# Patient Record
Sex: FEMALE | Race: WHITE | Employment: FULL TIME | URBAN - METROPOLITAN AREA
[De-identification: names, ages, dates, MRNs, and addresses within clinical notes are randomized per-mention and may not be internally consistent; named-entity substitution may affect disease eponyms.]

---

## 2017-11-15 ENCOUNTER — TELEPHONE (OUTPATIENT)
Dept: CARDIOLOGY CLINIC | Age: 57
End: 2017-11-15

## 2017-11-15 NOTE — TELEPHONE ENCOUNTER
Faxed [facility] [phone] denial of Metoprolol tartrate 25 mg haven't seen since 12-

## 2017-11-27 ENCOUNTER — TELEPHONE (OUTPATIENT)
Dept: CARDIOLOGY CLINIC | Age: 57
End: 2017-11-27

## 2017-11-27 NOTE — TELEPHONE ENCOUNTER
Pt has made an appt w/Dr. Valentino Greenspan on 12/26/17, but she needs a refill on her meds until she can come in for her visit. Please call to advise if this can be done.     Thanks,

## 2017-11-28 RX ORDER — METOPROLOL TARTRATE 25 MG/1
12.5 TABLET, FILM COATED ORAL EVERY 12 HOURS
Qty: 90 TAB | Refills: 0 | Status: SHIPPED | OUTPATIENT
Start: 2017-11-28 | End: 2017-12-19 | Stop reason: SDUPTHER

## 2017-12-25 RX ORDER — METOPROLOL TARTRATE 25 MG/1
12.5 TABLET, FILM COATED ORAL EVERY 12 HOURS
Qty: 90 TAB | Refills: 0 | Status: SHIPPED | OUTPATIENT
Start: 2017-12-25 | End: 2018-02-05 | Stop reason: ALTCHOICE

## 2017-12-26 ENCOUNTER — HOSPITAL ENCOUNTER (EMERGENCY)
Age: 57
Discharge: HOME | End: 2017-12-26
Attending: EMERGENCY MEDICINE
Payer: COMMERCIAL

## 2017-12-26 VITALS
RESPIRATION RATE: 16 BRPM | HEIGHT: 66 IN | TEMPERATURE: 98.1 F | HEART RATE: 65 BPM | DIASTOLIC BLOOD PRESSURE: 76 MMHG | WEIGHT: 265 LBS | OXYGEN SATURATION: 97 % | SYSTOLIC BLOOD PRESSURE: 130 MMHG | BODY MASS INDEX: 42.59 KG/M2

## 2017-12-26 DIAGNOSIS — R00.2 PALPITATIONS: Primary | ICD-10-CM

## 2017-12-26 LAB
ALBUMIN SERPL-MCNC: 3.3 G/DL (ref 3.5–5)
ALBUMIN/GLOB SERPL: 0.9 {RATIO} (ref 1.1–2.2)
ALP SERPL-CCNC: 85 U/L (ref 45–117)
ALT SERPL-CCNC: 20 U/L (ref 12–78)
ANION GAP SERPL CALC-SCNC: 7 MMOL/L (ref 5–15)
APPEARANCE UR: CLEAR
AST SERPL-CCNC: 17 U/L (ref 15–37)
ATRIAL RATE: 78 BPM
BACTERIA URNS QL MICRO: ABNORMAL /HPF
BASOPHILS # BLD: 0 K/UL (ref 0–0.1)
BASOPHILS NFR BLD: 0 % (ref 0–1)
BILIRUB SERPL-MCNC: 0.4 MG/DL (ref 0.2–1)
BILIRUB UR QL: NEGATIVE
BNP SERPL-MCNC: 392 PG/ML (ref 0–125)
BUN SERPL-MCNC: 17 MG/DL (ref 6–20)
BUN/CREAT SERPL: 22 (ref 12–20)
CALCIUM SERPL-MCNC: 8.5 MG/DL (ref 8.5–10.1)
CALCULATED P AXIS, ECG09: 25 DEGREES
CALCULATED R AXIS, ECG10: -6 DEGREES
CALCULATED T AXIS, ECG11: -31 DEGREES
CHLORIDE SERPL-SCNC: 109 MMOL/L (ref 97–108)
CK SERPL-CCNC: 141 U/L (ref 26–192)
CO2 SERPL-SCNC: 26 MMOL/L (ref 21–32)
COLOR UR: ABNORMAL
CREAT SERPL-MCNC: 0.76 MG/DL (ref 0.55–1.02)
D DIMER PPP FEU-MCNC: 0.54 MG/L FEU (ref 0–0.65)
DIAGNOSIS, 93000: NORMAL
EOSINOPHIL # BLD: 0.2 K/UL (ref 0–0.4)
EOSINOPHIL NFR BLD: 1 % (ref 0–7)
EPITH CASTS URNS QL MICRO: ABNORMAL /LPF
ERYTHROCYTE [DISTWIDTH] IN BLOOD BY AUTOMATED COUNT: 14.2 % (ref 11.5–14.5)
GLOBULIN SER CALC-MCNC: 3.6 G/DL (ref 2–4)
GLUCOSE SERPL-MCNC: 112 MG/DL (ref 65–100)
GLUCOSE UR STRIP.AUTO-MCNC: NEGATIVE MG/DL
HCT VFR BLD AUTO: 42.1 % (ref 35–47)
HGB BLD-MCNC: 13.8 G/DL (ref 11.5–16)
HGB UR QL STRIP: ABNORMAL
HYALINE CASTS URNS QL MICRO: ABNORMAL /LPF (ref 0–5)
KETONES UR QL STRIP.AUTO: NEGATIVE MG/DL
LACTATE SERPL-SCNC: 1 MMOL/L (ref 0.4–2)
LEUKOCYTE ESTERASE UR QL STRIP.AUTO: ABNORMAL
LYMPHOCYTES # BLD: 2 K/UL (ref 0.8–3.5)
LYMPHOCYTES NFR BLD: 17 % (ref 12–49)
MCH RBC QN AUTO: 29.2 PG (ref 26–34)
MCHC RBC AUTO-ENTMCNC: 32.8 G/DL (ref 30–36.5)
MCV RBC AUTO: 89.2 FL (ref 80–99)
MONOCYTES # BLD: 0.9 K/UL (ref 0–1)
MONOCYTES NFR BLD: 8 % (ref 5–13)
NEUTS SEG # BLD: 8.6 K/UL (ref 1.8–8)
NEUTS SEG NFR BLD: 74 % (ref 32–75)
NITRITE UR QL STRIP.AUTO: NEGATIVE
P-R INTERVAL, ECG05: 138 MS
PH UR STRIP: 5 [PH] (ref 5–8)
PLATELET # BLD AUTO: 334 K/UL (ref 150–400)
POTASSIUM SERPL-SCNC: 3.8 MMOL/L (ref 3.5–5.1)
PROT SERPL-MCNC: 6.9 G/DL (ref 6.4–8.2)
PROT UR STRIP-MCNC: NEGATIVE MG/DL
Q-T INTERVAL, ECG07: 380 MS
QRS DURATION, ECG06: 82 MS
QTC CALCULATION (BEZET), ECG08: 433 MS
RBC # BLD AUTO: 4.72 M/UL (ref 3.8–5.2)
RBC #/AREA URNS HPF: ABNORMAL /HPF (ref 0–5)
SODIUM SERPL-SCNC: 142 MMOL/L (ref 136–145)
SP GR UR REFRACTOMETRY: 1.02 (ref 1–1.03)
TROPONIN I SERPL-MCNC: <0.04 NG/ML
UA: UC IF INDICATED,UAUC: ABNORMAL
UROBILINOGEN UR QL STRIP.AUTO: 0.2 EU/DL (ref 0.2–1)
VENTRICULAR RATE, ECG03: 78 BPM
WBC # BLD AUTO: 11.7 K/UL (ref 3.6–11)
WBC URNS QL MICRO: ABNORMAL /HPF (ref 0–4)

## 2017-12-26 PROCEDURE — 99285 EMERGENCY DEPT VISIT HI MDM: CPT

## 2017-12-26 PROCEDURE — 85025 COMPLETE CBC W/AUTO DIFF WBC: CPT | Performed by: EMERGENCY MEDICINE

## 2017-12-26 PROCEDURE — 83880 ASSAY OF NATRIURETIC PEPTIDE: CPT | Performed by: EMERGENCY MEDICINE

## 2017-12-26 PROCEDURE — 83605 ASSAY OF LACTIC ACID: CPT | Performed by: EMERGENCY MEDICINE

## 2017-12-26 PROCEDURE — 94762 N-INVAS EAR/PLS OXIMTRY CONT: CPT

## 2017-12-26 PROCEDURE — 81001 URINALYSIS AUTO W/SCOPE: CPT | Performed by: EMERGENCY MEDICINE

## 2017-12-26 PROCEDURE — 36415 COLL VENOUS BLD VENIPUNCTURE: CPT | Performed by: EMERGENCY MEDICINE

## 2017-12-26 PROCEDURE — 82550 ASSAY OF CK (CPK): CPT | Performed by: EMERGENCY MEDICINE

## 2017-12-26 PROCEDURE — 87086 URINE CULTURE/COLONY COUNT: CPT | Performed by: EMERGENCY MEDICINE

## 2017-12-26 PROCEDURE — 80053 COMPREHEN METABOLIC PANEL: CPT | Performed by: EMERGENCY MEDICINE

## 2017-12-26 PROCEDURE — 87077 CULTURE AEROBIC IDENTIFY: CPT | Performed by: EMERGENCY MEDICINE

## 2017-12-26 PROCEDURE — 84484 ASSAY OF TROPONIN QUANT: CPT | Performed by: EMERGENCY MEDICINE

## 2017-12-26 PROCEDURE — 87186 SC STD MICRODIL/AGAR DIL: CPT | Performed by: EMERGENCY MEDICINE

## 2017-12-26 PROCEDURE — 93005 ELECTROCARDIOGRAM TRACING: CPT

## 2017-12-26 PROCEDURE — 85379 FIBRIN DEGRADATION QUANT: CPT | Performed by: EMERGENCY MEDICINE

## 2017-12-26 NOTE — ED PROVIDER NOTES
EMERGENCY DEPARTMENT HISTORY AND PHYSICAL EXAM      Date: 12/26/2017  Patient Name: Alexys Canada    History of Presenting Illness     Chief Complaint   Patient presents with    Anxiety     x 5 hours. hx of anxiety. pt states \"i started feeling palpitations and usually i can drink some water and get it to go away but i couldnt this time\"    Palpitations       History Provided By: Patient    HPI: Alexys Canada is a 56 y.o. female, who presents via EMS to the ED c/o acute onset of heart palpitations x2130. Pt notes she took her nightly Metoprolol and a baby ASA, along with drinking water without any relief of sxs. Pt reports by the time EMS had arrived her sxs had resolved. She denies any recent long distance travel. Pt specifically denies any leg pain/swelling, fever, congestion, cough, shortness of breath, chest pain, abdominal pain, nausea, vomiting, diarrhea, dysuria, or urinary frequency. PCP: None   Cardiologist: Lico Molina MD    PMHx: Significant for A-fib  PSHx: Significant for tubal Ligation  Social Hx: +tobacco, -EtOH, -Illicit Drugs     There are no other complaints, changes, or physical findings at this time. Current Outpatient Prescriptions   Medication Sig Dispense Refill    metoprolol tartrate (LOPRESSOR) 25 mg tablet Take 0.5 Tabs by mouth every twelve (12) hours. Dr Lico Molina appointment 12/26/17 @ 315 PM 90 Tab 0    aspirin delayed-release 81 mg tablet Take 1 Tab by mouth daily. Over the counter for atrial fibrillation 90 Tab 11       Past History     Past Medical History:  History reviewed. No pertinent past medical history.     Past Surgical History:  Past Surgical History:   Procedure Laterality Date    HX TUBAL LIGATION         Family History:  Family History   Problem Relation Age of Onset    Other Other      no known FH of CAD       Social History:  Social History   Substance Use Topics    Smoking status: Current Every Day Smoker     Packs/day: 1.00     Years: 35.00 Types: Cigarettes    Smokeless tobacco: Never Used    Alcohol use No       Allergies: Allergies   Allergen Reactions    Beef Containing Products Hives    Pork/Porcine Containing Products Hives    Macrobid [Nitrofurantoin Monohyd/M-Cryst] Hives    Pcn [Penicillins] Hives         Review of Systems   Review of Systems   Constitutional: Negative. Negative for fever. Eyes: Negative. Respiratory: Negative. Negative for shortness of breath. Cardiovascular: Positive for palpitations. Negative for chest pain. Gastrointestinal: Negative for abdominal pain, nausea and vomiting. Endocrine: Negative. Genitourinary: Negative. Negative for difficulty urinating, dysuria and hematuria. Musculoskeletal: Negative. Skin: Negative. Allergic/Immunologic: Negative. Neurological: Negative. Psychiatric/Behavioral: Negative for suicidal ideas. All other systems reviewed and are negative. Physical Exam   Physical Exam   Constitutional: She is oriented to person, place, and time. She appears well-developed and well-nourished. No distress. HENT:   Head: Normocephalic and atraumatic. Nose: Nose normal.   Eyes: Conjunctivae and EOM are normal. No scleral icterus. Neck: Normal range of motion. No tracheal deviation present. Cardiovascular: Normal rate, regular rhythm, normal heart sounds and intact distal pulses. Exam reveals no friction rub. No murmur heard. Pulmonary/Chest: Effort normal and breath sounds normal. No stridor. No respiratory distress. She has no wheezes. She has no rales. Abdominal: Soft. Bowel sounds are normal. She exhibits no distension. There is no tenderness. There is no rebound. Musculoskeletal: Normal range of motion. She exhibits no tenderness. Neurological: She is alert and oriented to person, place, and time. No cranial nerve deficit. Skin: Skin is warm and dry. No rash noted. She is not diaphoretic. Psychiatric: She has a normal mood and affect.  Her speech is normal and behavior is normal. Judgment and thought content normal. Cognition and memory are normal.   Nursing note and vitals reviewed. Diagnostic Study Results     Labs -     Recent Results (from the past 12 hour(s))   CBC WITH AUTOMATED DIFF    Collection Time: 12/26/17  3:19 AM   Result Value Ref Range    WBC 11.7 (H) 3.6 - 11.0 K/uL    RBC 4.72 3.80 - 5.20 M/uL    HGB 13.8 11.5 - 16.0 g/dL    HCT 42.1 35.0 - 47.0 %    MCV 89.2 80.0 - 99.0 FL    MCH 29.2 26.0 - 34.0 PG    MCHC 32.8 30.0 - 36.5 g/dL    RDW 14.2 11.5 - 14.5 %    PLATELET 809 682 - 078 K/uL    NEUTROPHILS 74 32 - 75 %    LYMPHOCYTES 17 12 - 49 %    MONOCYTES 8 5 - 13 %    EOSINOPHILS 1 0 - 7 %    BASOPHILS 0 0 - 1 %    ABS. NEUTROPHILS 8.6 (H) 1.8 - 8.0 K/UL    ABS. LYMPHOCYTES 2.0 0.8 - 3.5 K/UL    ABS. MONOCYTES 0.9 0.0 - 1.0 K/UL    ABS. EOSINOPHILS 0.2 0.0 - 0.4 K/UL    ABS.  BASOPHILS 0.0 0.0 - 0.1 K/UL   LACTIC ACID    Collection Time: 12/26/17  3:19 AM   Result Value Ref Range    Lactic acid 1.0 0.4 - 2.0 MMOL/L   URINALYSIS W/ REFLEX CULTURE    Collection Time: 12/26/17  3:19 AM   Result Value Ref Range    Color YELLOW/STRAW      Appearance CLEAR CLEAR      Specific gravity 1.017 1.003 - 1.030      pH (UA) 5.0 5.0 - 8.0      Protein NEGATIVE  NEG mg/dL    Glucose NEGATIVE  NEG mg/dL    Ketone NEGATIVE  NEG mg/dL    Bilirubin NEGATIVE  NEG      Blood MODERATE (A) NEG      Urobilinogen 0.2 0.2 - 1.0 EU/dL    Nitrites NEGATIVE  NEG      Leukocyte Esterase SMALL (A) NEG      WBC 20-50 0 - 4 /hpf    RBC 5-10 0 - 5 /hpf    Epithelial cells FEW FEW /lpf    Bacteria 1+ (A) NEG /hpf    UA:UC IF INDICATED URINE CULTURE ORDERED (A) CNI      Hyaline cast 0-2 0 - 5 /lpf   D DIMER    Collection Time: 12/26/17  3:19 AM   Result Value Ref Range    D-dimer 0.54 0.00 - 0.65 mg/L FEU       Radiologic Studies -   No orders to display     CT Results  (Last 48 hours)    None        CXR Results  (Last 48 hours)    None            Medical Decision Making   I am the first provider for this patient. I reviewed the vital signs, available nursing notes, past medical history, past surgical history, family history and social history. Vital Signs-Reviewed the patient's vital signs. Patient Vitals for the past 12 hrs:   Temp Pulse Resp BP SpO2   12/26/17 0225 98.1 °F (36.7 °C) 81 14 121/46 99 %       Pulse Oximetry Analysis - 99% on RA    Cardiac Monitor:   Rate: 81 bpm  Rhythm: Normal Sinus Rhythm      Records Reviewed: Nursing Notes and Old Medical Records    Provider Notes (Medical Decision Making):     DDX:  afib with rvr, arrhythmia, electrolyte abnormality, uti, dehydration, acs, pe    Plan:  Labs, ce's, d dimer    Impression:  palpitations    ED Course:   Initial assessment performed. The patients presenting problems have been discussed, and they are in agreement with the care plan formulated and outlined with them. I have encouraged them to ask questions as they arise throughout their visit. I reviewed our electronic medical record system for any past medical records that were available that may contribute to the patients current condition, the nursing notes and and vital signs from today's visit    Nursing notes will be reviewed as they become available in realtime while the pt has been in the ED. Quang Salas MD    I have spent 3-7 minutes discussing the medical risks of prolonged smoking habits and advised the patient of the benefits of the cessation of smoking, providing specific suggestions on how to quit. Quang Salas MD    EKG interpretation 1027: NSR, nl Axis, rate 78; , QRS 82, QTc 433; non specific t wave abnormality, no change from previous, no acute ischemia; Quang Salas MD    I personally reviewed pt's imaging. Official read by radiology listed below.   Quang Salas MD    5:36 AM  Progress note:  Pt noted to be feeling better, has been palpitation free and resting comfortably since arriving to ED, ready for discharge. Discussed lab and imaging findings with pt and/or family, specifically noting few bacteria in urine and plan to wait for culture. Pt will follow up as instructed. All questions have been answered, pt voiced understanding and agreement with plan. If narcotics were prescribed, pt was advised not to drive or operate heavy machinery. If abx were prescribed, pt advised that diarrhea and rash are possible side effects of the medications. Specific return precautions provided in addition to instructions for pt to return to the ED immediately should sx worsen at any time. Johnny Calix MD        PLAN:  1. Current Discharge Medication List        2. Follow-up Information     Follow up With Details Comments Contact Info    Jie Eaton MD Call today  [address]  [address] [address] [phone]          Return to ED if worse     Disposition:    Dc home with cardiology f/u      Diagnosis     Clinical Impression:   1. Palpitations        Attestations: This note is prepared by Natalie Solitario, acting as Scribe for MD Johnny Marcial MD : The scribe's documentation has been prepared under my direction and personally reviewed by me in its entirety. I confirm that the note above accurately reflects all work, treatment, procedures, and medical decision making performed by me. This note will not be viewable in [address]

## 2017-12-26 NOTE — ED NOTES
Dr. Luca Thomas reviewed discharge instructions with the patient. The patient verbalized understanding. All questions and concerns were addressed. The patient declined a wheelchair and is discharged ambulatory in the care of family members with instructions and prescriptions in hand. Pt is alert and oriented x 4. Respirations are clear and unlabored.

## 2017-12-26 NOTE — DISCHARGE INSTRUCTIONS
Cardiac Arrhythmia: Care Instructions  Your Care Instructions    A cardiac arrhythmia is a change in the normal rhythm of the heart. Your heart may beat too fast or too slow or beat with an irregular or skipping rhythm. A change in the heart's rhythm may feel like a really strong heartbeat or a fluttering in your chest. A severe heart rhythm problem can keep the body from getting the blood it needs. This can result in shortness of breath, lightheadedness, and fainting. You may take medicine to treat your condition. Your doctor may recommend a pacemaker or recommend catheter ablation to destroy small parts of the heart that are causing a rhythm problem. Another possible treatment is an implantable cardioverter-defibrillator (ICD). An ICD is a device that gives the heart a shock to return the heart to a normal rhythm. Follow-up care is a key part of your treatment and safety. Be sure to make and go to all appointments, and call your doctor if you are having problems. It's also a good idea to know your test results and keep a list of the medicines you take. How can you care for yourself at home? ?General care  ? · Be safe with medicines. Take your medicines exactly as prescribed. Call your doctor if you think you are having a problem with your medicine. You will get more details on the specific medicines your doctor prescribes. ? · If you received a pacemaker or an ICD, you will get a fact sheet about it. ? · Wear medical alert jewelry that says you have an abnormal heart rhythm. You can buy this at most drugstores. ? Lifestyle changes  ? · Eat a heart-healthy diet. ? · Stay at a healthy weight. Lose weight if you need to. ? · Avoid nicotine, too much alcohol, and illegal drugs (meth, speed, and cocaine). Also, get enough sleep and do not overeat. ? · Ask your doctor whether you can take over-the-counter medicines (such as decongestants).  These can make your heart beat fast.   ? · Talk to your doctor about any limits to activities, such as driving, or tasks where you use power tools or ladders. Activity  ? · Start light exercise if your doctor says you can. Even a small amount will help you get stronger, have more energy, and manage your stress. ? · Get regular exercise. Try for 30 minutes on most days of the week. Ask your doctor what level of exercise is safe for you. If activity is not likely to cause health problems, you probably do not have limits on the type or level of activity that you can do. You may want to walk, swim, bike, or do other activities. ? · When you exercise, watch for signs that your heart is working too hard. You are pushing too hard if you cannot talk while you exercise. If you become short of breath or dizzy or have chest pain, sit down and rest.   ? · Check your pulse daily. Place two fingers on the artery at the palm side of your wrist, in line with your thumb. If your heartbeat seems uneven, talk to your doctor. When should you call for help? Call 911 anytime you think you may need emergency care. For example, call if:  ? · You have symptoms of sudden heart failure. These may include:  ¨ Severe trouble breathing. ¨ A fast or irregular heartbeat. ¨ Coughing up pink, foamy mucus. ¨ You passed out. ? · You have signs of a stroke. These include:  ¨ Sudden numbness, paralysis, or weakness in your face, arm, or leg, especially on only one side of your body. ¨ New problems with walking or balance. ¨ Sudden vision changes. ¨ Drooling or slurred speech. ¨ New problems speaking or understanding simple statements, or feeling confused. ¨ A sudden, severe headache that is different from past headaches. ?Call your doctor now or seek immediate medical care if:  ? · You have new or changed symptoms of heart failure, such as:  ¨ New or increased shortness of breath. ¨ New or worse swelling in your legs, ankles, or feet.   ¨ Sudden weight gain, such as more than 2 to 3 pounds in a day or 5 pounds in a week. (Your doctor may suggest a different range of weight gain.)  ¨ Feeling dizzy or lightheaded or like you may faint. ¨ Feeling so tired or weak that you cannot do your usual activities. ¨ Not sleeping well. Shortness of breath wakes you at night. You need extra pillows to prop yourself up to breathe easier. ? Watch closely for changes in your health, and be sure to contact your doctor if:  ? · You do not get better as expected. Where can you learn more? Go to http://trevor-darrick.info/. Enter R892 in the search box to learn more about \"Cardiac Arrhythmia: Care Instructions. \"  Current as of: September 21, 2016  Content Version: 11.4  © 2093-1946 [employer]. Care instructions adapted under license by [employer] (which disclaims liability or warranty for this information). If you have questions about a medical condition or this instruction, always ask your healthcare professional. [address] any warranty or liability for your use of this information.

## 2017-12-26 NOTE — LETTER
12/29/2017 [place] [address] Dear Ms. Akash Salvador, You were recently seen in the Emergency Department of [place] [address]. and had lab work performed. We would like to discuss these results with you. Please call the Emergency Department at your earliest convenience at [phone] between 10am-8pm to speak with one of our providers. Sincerely, Emory De Leon, [address] 
 
 
[facility] EMERGENCY DEPT 
[address] [place] [address] 
[phone]

## 2017-12-27 NOTE — PROGRESS NOTES
Pt seen for palpitations. No urinary complaints. Not discharged on antibiotics. Final results and sensitivities pending.

## 2017-12-28 LAB
BACTERIA SPEC CULT: ABNORMAL
CC UR VC: ABNORMAL
SERVICE CMNT-IMP: ABNORMAL

## 2018-02-05 ENCOUNTER — OFFICE VISIT (OUTPATIENT)
Dept: CARDIOLOGY CLINIC | Age: 58
End: 2018-02-05

## 2018-02-05 VITALS
DIASTOLIC BLOOD PRESSURE: 88 MMHG | SYSTOLIC BLOOD PRESSURE: 126 MMHG | HEIGHT: 66 IN | RESPIRATION RATE: 18 BRPM | HEART RATE: 76 BPM | OXYGEN SATURATION: 97 % | WEIGHT: 246.9 LBS | BODY MASS INDEX: 39.68 KG/M2

## 2018-02-05 DIAGNOSIS — I49.9 IRREGULAR HEART BEAT: ICD-10-CM

## 2018-02-05 DIAGNOSIS — R07.2 PRECORDIAL CHEST PAIN: ICD-10-CM

## 2018-02-05 DIAGNOSIS — R53.83 FATIGUE, UNSPECIFIED TYPE: ICD-10-CM

## 2018-02-05 DIAGNOSIS — R00.2 HEART PALPITATIONS: ICD-10-CM

## 2018-02-05 DIAGNOSIS — I48.0 PAF (PAROXYSMAL ATRIAL FIBRILLATION) (HCC): Primary | ICD-10-CM

## 2018-02-05 RX ORDER — METOPROLOL SUCCINATE 25 MG/1
12.5 TABLET, EXTENDED RELEASE ORAL
Qty: 45 TAB | Refills: 3 | Status: SHIPPED | OUTPATIENT
Start: 2018-02-05 | End: 2018-06-11 | Stop reason: SDUPTHER

## 2018-02-05 NOTE — MR AVS SNAPSHOT
[address] [facility] 
[phone] Patient: Samantha Ledesma MRN: [id number] EXI:[id number] Visit Information Date & Time Provider Department Dept. Phone Encounter #  
 2/5/2018 11:15 AM Temo Cheung, [address] [place] [address] [facility] [phone] [id number] Upcoming Health Maintenance Date Due Hepatitis C Screening 1960 Pneumococcal 19-64 Medium Risk (1 of 1 - PPSV23) 2/9/1981 PAP AKA CERVICAL CYTOLOGY 2/9/1983 BREAST CANCER SCRN MAMMOGRAM 2/9/2012 FOBT Q 1 YEAR AGE 50-75 2/9/2012 Influenza Age 5 to Adult 8/1/2017 DTaP/Tdap/Td series (2 - Td) 7/21/2026 Allergies as of 2/5/2018  Review Complete On: 2/5/2018 By: Temo Cheung MD  
  
 Severity Noted Reaction Type Reactions Beef Containing Products High 11/28/2015    Hives Pork/porcine Containing Products High 11/28/2015    Hives Macrobid [Nitrofurantoin Monohyd/m-cryst]  11/28/2015    Hives Pcn [Penicillins]  10/14/2013    Hives Current Immunizations  Reviewed on 11/27/2015 Name Date Influenza Vaccine (Quad) PF 11/28/2015 11:48 AM  
 Tdap 7/21/2016 12:02 PM  
  
 Not reviewed this visit You Were Diagnosed With   
  
 Codes Comments Irregular heart beat    -  Primary ICD-10-CM: I49.9 ICD-9-CM: 427.9 PAF (paroxysmal atrial fibrillation) (HCC)     ICD-10-CM: I48.0 ICD-9-CM: 427.31 Fatigue, unspecified type     ICD-10-CM: R53.83 ICD-9-CM: 780.79 Precordial chest pain     ICD-10-CM: R07.2 ICD-9-CM: 786.51 Heart palpitations     ICD-10-CM: R00.2 ICD-9-CM: 785.1 Vitals BP Pulse Resp Height(growth percentile) Weight(growth percentile) SpO2  
 126/88 (BP 1 Location: Right arm, BP Patient Position: Sitting) 76 18 5' 6\" (1.676 m) 246 lb 14.4 oz (112 kg) 97% BMI Smoking Status 39.85 kg/m2 Current Every Day Smoker Vitals History BMI and BSA Data Body Mass Index Body Surface Area  
 39.85 kg/m 2 2.28 m 2 Preferred Pharmacy Pharmacy Name Phone [facility] [phone] Your Updated Medication List  
  
   
This list is accurate as of: 2/5/18 12:23 PM.  Always use your most recent med list.  
  
  
  
  
 aspirin delayed-release 81 mg tablet Take 1 Tab by mouth daily. Over the counter for atrial fibrillation  
  
 metoprolol succinate 25 mg XL tablet Commonly known as:  TOPROL-XL Take 0.5 Tabs by mouth nightly. Stop Lopressor Prescriptions Sent to Pharmacy Refills  
 metoprolol succinate (TOPROL-XL) 25 mg XL tablet 3 Sig: Take 0.5 Tabs by mouth nightly. Stop Lopressor Class: Normal  
 Pharmacy: [address] [place] [address] #: [phone] Route: Oral  
  
We Performed the Following AMB POC EKG ROUTINE W/ 12 LEADS, INTER & REP [93769 CPT(R)] CBC WITH AUTOMATED DIFF [21443 CPT(R)] LIPID PANEL [39389 CPT(R)] METABOLIC PANEL, COMPREHENSIVE [72393 CPT(R)] REFERRAL TO SLEEP STUDIES [REF99 Custom] THYROID PANEL W/TSH [03870 CPT(R)] VITAMIN B12 & FOLATE [78298 CPT(R)] To-Do List   
 02/05/2018 ECG:  ECG EVENT RECORD MONITORING SET-UP   
  
 02/06/2018 ECHO:  ECHO TTE STRESS EXRCSE COMP W OR WO CONTR   
  
 02/06/2018 Lab:  VITAMIN D, 1, 25 DIHYDROXY Referral Information Referral ID Referred By Referred To  
  
 [id number] [facility], [address] G[address] Doss MD   
   [address] [place] [address] [address] [place], [address] Phone: [phone] Fax: [phone] Visits Status Start Date End Date 1 New Request 2/5/18 2/5/19 If your referral has a status of pending review or denied, additional information will be sent to support the outcome of this decision. Introducing [facility]! [employer] introduces [employer] patient portal. Now you can access parts of your medical record, email your doctor's office, and request medication refills online. 1. In your internet browser, go to [email or web address] 2. Click on the First Time User? Click Here link in the Sign In box. You will see the New Member Sign Up page. 3. Enter your [employer] Access Code exactly as it appears below. You will not need to use this code after youve completed the sign-up process. If you do not sign up before the expiration date, you must request a new code. · [employer] Access Code: [email or web address] Expires: 3/26/2018  5:33 AM 
 
4. Enter the last four digits of your Social Security Number (xxxx) and Date of Birth (mm/dd/yyyy) as indicated and click Submit. You will be taken to the next sign-up page. 5. Create a [employer]wheel ID. This will be your [employer] login ID and cannot be changed, so think of one that is secure and easy to remember. 6. Create a [employer] password. You can change your password at any time. 7. Enter your Password Reset Question and Answer. This can be used at a later time if you forget your password. 8. Enter your e-mail address. You will receive e-mail notification when new information is available in [address] 9. Click Sign Up. You can now view and download portions of your medical record. 10. Click the Download Summary menu link to download a portable copy of your medical information. If you have questions, please visit the Frequently Asked Questions section of the [employer] website. Remember, [employer] is NOT to be used for urgent needs. For medical emergencies, dial 911. Now available from your iPhone and Android! Please provide this summary of care documentation to your next provider. Your primary care clinician is listed as NONE. If you have any questions after today's visit, please call [phone].

## 2018-02-05 NOTE — PROGRESS NOTES
Bradly De La Cruz DNP, ANP-BC  Subjective/HPI:     Viktoriya Pineda is a 56 y.o. female is here for routine f/u. Ms. Acosta Silveira presented to emergency room December 26 with reported fluttering and palpitations, sinus rhythm. She feels it may have been more anxiety related. History of paroxysmal A. fib has maintain aspirin and Lopressor 12.5 mg twice a day however admits to only taking it once a day during the summer. She feels taking it twice a day is needed in the winter hours secondary to higher levels of stress. She also reports episodes of left arm pain with or without exertional activities, and dyspnea on exertion but admits she does not exert herself much. Has witnessed episodes of sleep apnea per family members, we discussed that at 1 consult a sleep study however she did not have coverage at the time but is willing to proceed now. Smokes 1 pack per day. Patient Active Problem List   Diagnosis Code    PAF (paroxysmal atrial fibrillation) ([facility]) I48.0    UTI (lower urinary tract infection) N39.0    Allergic drug rash due to sulfonamide L27.0, T37.0X5A    Irregular heart beat I49.9       PCP Provider  None  History reviewed. No pertinent past medical history. Past Surgical History:   Procedure Laterality Date    HX TUBAL LIGATION       Allergies   Allergen Reactions    Beef Containing Products Hives    Pork/Porcine Containing Products Hives    Macrobid [Nitrofurantoin Monohyd/M-Cryst] Hives    Pcn [Penicillins] Hives      Family History   Problem Relation Age of Onset    Other Other      no known FH of CAD      Current Outpatient Prescriptions   Medication Sig    metoprolol succinate (TOPROL-XL) 25 mg XL tablet Take 0.5 Tabs by mouth nightly. Stop Lopressor    aspirin delayed-release 81 mg tablet Take 1 Tab by mouth daily. Over the counter for atrial fibrillation     No current facility-administered medications for this visit.        Vitals:    02/05/18 1115 02/05/18 1130   BP: 124/90 126/88   Pulse: 76    Resp: 18    SpO2: 97%    Weight: 246 lb 14.4 oz (112 kg)    Height: 5' 6\" (1.676 m)      Social History     Social History    Marital status:      Spouse name: N/A    Number of children: N/A    Years of education: N/A     Occupational History    Not on file. Social History Main Topics    Smoking status: Current Every Day Smoker     Packs/day: 1.00     Years: 35.00     Types: Cigarettes    Smokeless tobacco: Never Used    Alcohol use No    Drug use: No    Sexual activity: Not on file     Other Topics Concern    Not on file     Social History Narrative       I have reviewed the nurses notes, vitals, problem list, allergy list, medical history, family, social history and medications. Review of Symptoms:    General: Pt denies excessive weight gain or loss. Pt is able to conduct ADL's, +  daytime fatigue  + HEENT: Denies blurred vision, headaches, epistaxis and difficulty swallowing. Respiratory: Denies shortness of breath, NIETO, wheezing or stridor. Cardiovascular: Denies precordial pain, + intermittent denies palpitations, edema or PND  Gastrointestinal: Denies poor appetite, indigestion, abdominal pain or blood in stool  Musculoskeletal: Denies pain or swelling from muscles or joints  Neurologic: Denies tremor, paresthesias, or sensory motor disturbance  Skin: Denies rash, itching or texture change. Physical Exam:      General: Well developed, in no acute distress, cooperative and alert  HEENT: No carotid bruits, no JVD, trach is midline. Neck Supple, PEERL, EOM intact. Heart:  Normal S1/S2 negative S3 or S4. Regular, no murmur, gallop or rub.   Respiratory: Clear bilaterally x 4, no wheezing or rales  Abdomen:   Soft, non-tender, no masses, bowel sounds are active.   Extremities:  No edema, normal cap refill, no cyanosis, atraumatic. Neuro: A&Ox3, speech clear, gait stable. Skin: Skin color is normal. No rashes or lesions.  Non diaphoretic  Vascular: 2+ pulses symmetric in all extremities    Cardiographics    ECG: Sinus rhythm  Results for orders placed or performed during the hospital encounter of 12/26/17   EKG, 12 LEAD, INITIAL   Result Value Ref Range    Ventricular Rate 78 BPM    Atrial Rate 78 BPM    P-R Interval 138 ms    QRS Duration 82 ms    Q-T Interval 380 ms    QTC Calculation (Bezet) 433 ms    Calculated P Axis 25 degrees    Calculated R Axis -6 degrees    Calculated T Axis -31 degrees    Diagnosis       Normal sinus rhythm  Non-specific ST & T wave changes  Confirmed by Edward Aragon ([id number]) on 12/26/2017 7:27:55 PM           Cardiology Labs:  No results found for: CHOL, CHOLX, CHLST, CHOLV, 395467, HDL, LDL, LDLC, DLDLP, TGLX, TRIGL, TRIGP, CHHD, [facility]    Lab Results   Component Value Date/Time    Sodium 142 12/26/2017 04:03 AM    Potassium 3.8 12/26/2017 04:03 AM    Chloride 109 (H) 12/26/2017 04:03 AM    CO2 26 12/26/2017 04:03 AM    Anion gap 7 12/26/2017 04:03 AM    Glucose 112 (H) 12/26/2017 04:03 AM    BUN 17 12/26/2017 04:03 AM    Creatinine 0.76 12/26/2017 04:03 AM    BUN/Creatinine ratio 22 (H) 12/26/2017 04:03 AM    GFR est AA >60 12/26/2017 04:03 AM    GFR est non-AA >60 12/26/2017 04:03 AM    Calcium 8.5 12/26/2017 04:03 AM    Bilirubin, total 0.4 12/26/2017 04:03 AM    AST (SGOT) 17 12/26/2017 04:03 AM    Alk. phosphatase 85 12/26/2017 04:03 AM    Protein, total 6.9 12/26/2017 04:03 AM    Albumin 3.3 (L) 12/26/2017 04:03 AM    Globulin 3.6 12/26/2017 04:03 AM    A-G Ratio 0.9 (L) 12/26/2017 04:03 AM    ALT (SGPT) 20 12/26/2017 04:03 AM           Assessment:     Assessment:     Diagnoses and all orders for this visit:    1.  PAF (paroxysmal atrial fibrillation) (HCC)  -     METABOLIC PANEL, COMPREHENSIVE  -     THYROID PANEL W/TSH  -     CBC WITH AUTOMATED DIFF  -     LIPID PANEL  -     VITAMIN D, 1, 25 DIHYDROXY; Future  -     VITAMIN B12 & FOLATE  -     REFERRAL TO SLEEP STUDIES  -     ECHO TTE STRESS EXRCSE COMP W OR WO CONTR; Future  - ECG EVENT RECORD MONITORING SET-UP; Future    2. Irregular heart beat  -     AMB POC EKG ROUTINE W/ 12 LEADS, INTER & REP  -     METABOLIC PANEL, COMPREHENSIVE  -     THYROID PANEL W/TSH  -     CBC WITH AUTOMATED DIFF  -     LIPID PANEL  -     VITAMIN D, 1, 25 DIHYDROXY; Future  -     VITAMIN B12 & FOLATE  -     REFERRAL TO SLEEP STUDIES  -     ECHO TTE STRESS EXRCSE COMP W OR WO CONTR; Future  -     ECG EVENT RECORD MONITORING SET-UP; Future    3. Fatigue, unspecified type  -     METABOLIC PANEL, COMPREHENSIVE  -     THYROID PANEL W/TSH  -     CBC WITH AUTOMATED DIFF  -     LIPID PANEL  -     VITAMIN D, 1, 25 DIHYDROXY; Future  -     VITAMIN B12 & FOLATE  -     REFERRAL TO SLEEP STUDIES  -     ECHO TTE STRESS EXRCSE COMP W OR WO CONTR; Future  -     ECG EVENT RECORD MONITORING SET-UP; Future    4. Precordial chest pain  -     ECG EVENT RECORD MONITORING SET-UP; Future    5. Heart palpitations  -     ECG EVENT RECORD MONITORING SET-UP; Future    Other orders  -     metoprolol succinate (TOPROL-XL) 25 mg XL tablet; Take 0.5 Tabs by mouth nightly. Stop Lopressor        ICD-10-CM ICD-9-CM    1. PAF (paroxysmal atrial fibrillation) ([facility]) V08.8 696.15 METABOLIC PANEL, COMPREHENSIVE      THYROID PANEL W/TSH      CBC WITH AUTOMATED DIFF      LIPID PANEL      VITAMIN D, 1, 25 DIHYDROXY      VITAMIN B12 & FOLATE      REFERRAL TO SLEEP STUDIES      ECHO TTE STRESS EXRCSE COMP W OR WO CONTR      ECG EVENT RECORD MONITORING SET-UP   2. Irregular heart beat I49.9 427.9 AMB POC EKG ROUTINE W/ 12 LEADS, INTER & REP      METABOLIC PANEL, COMPREHENSIVE      THYROID PANEL W/TSH      CBC WITH AUTOMATED DIFF      LIPID PANEL      VITAMIN D, 1, 25 DIHYDROXY      VITAMIN B12 & FOLATE      REFERRAL TO SLEEP STUDIES      ECHO TTE STRESS EXRCSE COMP W OR WO CONTR      ECG EVENT RECORD MONITORING SET-UP   3.  Fatigue, unspecified type K80.62 495.40 METABOLIC PANEL, COMPREHENSIVE      THYROID PANEL W/TSH      CBC WITH AUTOMATED DIFF LIPID PANEL      VITAMIN D, 1, 25 DIHYDROXY      VITAMIN B12 & FOLATE      REFERRAL TO SLEEP STUDIES      ECHO TTE STRESS EXRCSE COMP W OR WO CONTR      ECG EVENT RECORD MONITORING SET-UP   4. Precordial chest pain R07.2 786.51 ECG EVENT RECORD MONITORING SET-UP   5. Heart palpitations R00.2 785. 1 ECG EVENT RECORD MONITORING SET-UP     Orders Placed This Encounter    METABOLIC PANEL, COMPREHENSIVE    THYROID PANEL W/TSH    CBC WITH AUTOMATED DIFF    LIPID PANEL    VITAMIN D, 1, 25 DIHYDROXY     Standing Status:   Future     Number of Occurrences:   1     Standing Expiration Date:   6/5/2018    VITAMIN B12 & FOLATE    REFERRAL TO SLEEP STUDIES     Referral Priority:   Routine     Referral Type:   Consultation     Referral Reason:   Specialty Services Required     Referred to Provider:   Lauren Hannah MD     Requested Specialty:   Sleep Medicine     Number of Visits Requested:   1    AMB POC EKG ROUTINE W/ 12 LEADS, INTER & REP     Order Specific Question:   Reason for Exam:     Answer:   routine    ECG EVENT RECORD MONITORING SET-UP     Standing Status:   Future     Standing Expiration Date:   8/8/2018     Order Specific Question:   Reason for Exam:     Answer:   palpitations    ECHO TTE STRESS EXRCSE COMP W OR WO CONTR     Standing Status:   Future     Standing Expiration Date:   8/5/2018     Order Specific Question:   Reason for Exam:     Answer:   Chest discomfort left arm pain. Order Specific Question:   Contrast Enhancement (Bubble Study, Definity, Optison) may be used if criteria listed in established evidence-based protocol has been identified. Answer: Yes    metoprolol succinate (TOPROL-XL) 25 mg XL tablet     Sig: Take 0.5 Tabs by mouth nightly.  Stop Lopressor     Dispense:  39 Tab     Refill:  3        Plan:     Patient is a 16-year-old female with a history of a single episode of paroxysmal atrial fibrillation in 11/2015, lasting less than 24 hours, reporting episodes of intermittent palpitations and left arm discomfort. Will evaluate with stress echo to rule out ischemia, event monitor to evaluate for recurrent PAF. Regarding fatigue, battery of labs including thyroid panel CBC metabolic panel lipid vitamin B vitamin D. Will change beta-blocker dosing to metoprolol XL nightly. Sleep study ordered for witnessed sleep apnea per patient. Follow-up to be determined based on test results. Ziyad Machado MD    This note was created using voice recognition software. Despite editing, there may be syntax errors.

## 2018-02-05 NOTE — PROGRESS NOTES
1. Have you been to the ER, urgent care clinic since your last visit? Hospitalized since your last visit? Went to ER 12/27/17 for heart racing. 2. Have you seen or consulted any other health care providers outside of the [address] [place] [address] since your last visit? Include any pap smears or colon screening. No.      Chief Complaint   Patient presents with    New Patient     last seen in 2015-has 2 episodes of the heart racing in around the Holiday's-denies any other cardiac symptoms     Pt son noted that pt did have 14 second pause while sleeping and woke up gasping for breathe.

## 2018-02-06 DIAGNOSIS — I49.9 IRREGULAR HEART BEAT: ICD-10-CM

## 2018-02-06 DIAGNOSIS — R53.83 FATIGUE, UNSPECIFIED TYPE: ICD-10-CM

## 2018-02-06 DIAGNOSIS — I48.0 PAF (PAROXYSMAL ATRIAL FIBRILLATION) (HCC): ICD-10-CM

## 2018-02-14 ENCOUNTER — HOSPITAL ENCOUNTER (EMERGENCY)
Age: 58
Discharge: HOME | End: 2018-02-15
Attending: EMERGENCY MEDICINE | Admitting: EMERGENCY MEDICINE
Payer: COMMERCIAL

## 2018-02-14 DIAGNOSIS — I48.0 PAROXYSMAL ATRIAL FIBRILLATION WITH RAPID VENTRICULAR RESPONSE (HCC): Primary | ICD-10-CM

## 2018-02-14 DIAGNOSIS — R00.2 PALPITATIONS: ICD-10-CM

## 2018-02-14 LAB
ALBUMIN SERPL-MCNC: 3.8 G/DL (ref 3.5–5)
ALBUMIN/GLOB SERPL: 0.9 {RATIO} (ref 1.1–2.2)
ALP SERPL-CCNC: 93 U/L (ref 45–117)
ALT SERPL-CCNC: 18 U/L (ref 12–78)
ANION GAP SERPL CALC-SCNC: 8 MMOL/L (ref 5–15)
AST SERPL-CCNC: 13 U/L (ref 15–37)
BASOPHILS # BLD: 0 K/UL (ref 0–0.1)
BASOPHILS NFR BLD: 0 % (ref 0–1)
BILIRUB SERPL-MCNC: 0.4 MG/DL (ref 0.2–1)
BNP SERPL-MCNC: 91 PG/ML (ref 0–125)
BUN SERPL-MCNC: 14 MG/DL (ref 6–20)
BUN/CREAT SERPL: 17 (ref 12–20)
CALCIUM SERPL-MCNC: 8.9 MG/DL (ref 8.5–10.1)
CHLORIDE SERPL-SCNC: 106 MMOL/L (ref 97–108)
CK MB CFR SERPL CALC: NORMAL % (ref 0–2.5)
CK MB SERPL-MCNC: <1 NG/ML (ref 5–25)
CK SERPL-CCNC: 66 U/L (ref 26–192)
CO2 SERPL-SCNC: 26 MMOL/L (ref 21–32)
CREAT SERPL-MCNC: 0.83 MG/DL (ref 0.55–1.02)
DIFFERENTIAL METHOD BLD: ABNORMAL
EOSINOPHIL # BLD: 0.1 K/UL (ref 0–0.4)
EOSINOPHIL NFR BLD: 1 % (ref 0–7)
ERYTHROCYTE [DISTWIDTH] IN BLOOD BY AUTOMATED COUNT: 14 % (ref 11.5–14.5)
GLOBULIN SER CALC-MCNC: 4.1 G/DL (ref 2–4)
GLUCOSE SERPL-MCNC: 111 MG/DL (ref 65–100)
HCT VFR BLD AUTO: 49 % (ref 35–47)
HGB BLD-MCNC: 16 G/DL (ref 11.5–16)
IMM GRANULOCYTES # BLD: 0 K/UL (ref 0–0.04)
IMM GRANULOCYTES NFR BLD AUTO: 0 % (ref 0–0.5)
LYMPHOCYTES # BLD: 2.6 K/UL (ref 0.8–3.5)
LYMPHOCYTES NFR BLD: 23 % (ref 12–49)
MAGNESIUM SERPL-MCNC: 2.2 MG/DL (ref 1.6–2.4)
MCH RBC QN AUTO: 29.3 PG (ref 26–34)
MCHC RBC AUTO-ENTMCNC: 32.7 G/DL (ref 30–36.5)
MCV RBC AUTO: 89.6 FL (ref 80–99)
MONOCYTES # BLD: 1 K/UL (ref 0–1)
MONOCYTES NFR BLD: 9 % (ref 5–13)
NEUTS SEG # BLD: 7.3 K/UL (ref 1.8–8)
NEUTS SEG NFR BLD: 66 % (ref 32–75)
NRBC # BLD: 0 K/UL (ref 0–0.01)
NRBC BLD-RTO: 0 PER 100 WBC
PLATELET # BLD AUTO: 294 K/UL (ref 150–400)
PMV BLD AUTO: 10.4 FL (ref 8.9–12.9)
POTASSIUM SERPL-SCNC: 3.5 MMOL/L (ref 3.5–5.1)
PROT SERPL-MCNC: 7.9 G/DL (ref 6.4–8.2)
RBC # BLD AUTO: 5.47 M/UL (ref 3.8–5.2)
SODIUM SERPL-SCNC: 140 MMOL/L (ref 136–145)
TROPONIN I SERPL-MCNC: <0.04 NG/ML
WBC # BLD AUTO: 11 K/UL (ref 3.6–11)

## 2018-02-14 PROCEDURE — 83880 ASSAY OF NATRIURETIC PEPTIDE: CPT | Performed by: EMERGENCY MEDICINE

## 2018-02-14 PROCEDURE — 85025 COMPLETE CBC W/AUTO DIFF WBC: CPT | Performed by: EMERGENCY MEDICINE

## 2018-02-14 PROCEDURE — 94762 N-INVAS EAR/PLS OXIMTRY CONT: CPT

## 2018-02-14 PROCEDURE — 74011000250 HC RX REV CODE- 250: Performed by: EMERGENCY MEDICINE

## 2018-02-14 PROCEDURE — 93005 ELECTROCARDIOGRAM TRACING: CPT

## 2018-02-14 PROCEDURE — 99285 EMERGENCY DEPT VISIT HI MDM: CPT

## 2018-02-14 PROCEDURE — 96374 THER/PROPH/DIAG INJ IV PUSH: CPT

## 2018-02-14 PROCEDURE — 83735 ASSAY OF MAGNESIUM: CPT | Performed by: EMERGENCY MEDICINE

## 2018-02-14 PROCEDURE — 80053 COMPREHEN METABOLIC PANEL: CPT | Performed by: EMERGENCY MEDICINE

## 2018-02-14 PROCEDURE — 84484 ASSAY OF TROPONIN QUANT: CPT | Performed by: EMERGENCY MEDICINE

## 2018-02-14 PROCEDURE — 82550 ASSAY OF CK (CPK): CPT | Performed by: EMERGENCY MEDICINE

## 2018-02-14 PROCEDURE — 36415 COLL VENOUS BLD VENIPUNCTURE: CPT | Performed by: EMERGENCY MEDICINE

## 2018-02-14 RX ORDER — DILTIAZEM HYDROCHLORIDE 5 MG/ML
10 INJECTION INTRAVENOUS
Status: COMPLETED | OUTPATIENT
Start: 2018-02-14 | End: 2018-02-14

## 2018-02-14 RX ADMIN — DILTIAZEM HYDROCHLORIDE 10 MG: 5 INJECTION INTRAVENOUS at 22:32

## 2018-02-14 NOTE — LETTER
[address] 
[facility] EMERGENCY DEPT 
[address] [place] [address]. [address] [id number] [phone] Work/School Note Date: 2/14/2018 To Whom It May concern: 
 
Sotero Nieto was seen and treated today in the emergency room by the following provider(s): 
Attending Provider: Merissa Lopez MD. Sotero Nieto may return to work on 2/16/18.  
 
Sincerely, 
 
 
 
 
Merissa Lopez MD

## 2018-02-15 VITALS
RESPIRATION RATE: 14 BRPM | DIASTOLIC BLOOD PRESSURE: 52 MMHG | SYSTOLIC BLOOD PRESSURE: 92 MMHG | HEIGHT: 65 IN | HEART RATE: 64 BPM | TEMPERATURE: 97.6 F | BODY MASS INDEX: 40.22 KG/M2 | OXYGEN SATURATION: 97 % | WEIGHT: 241.4 LBS

## 2018-02-15 LAB
APPEARANCE UR: CLEAR
ATRIAL RATE: 166 BPM
ATRIAL RATE: 75 BPM
BACTERIA URNS QL MICRO: ABNORMAL /HPF
BILIRUB UR QL: NEGATIVE
CALCULATED P AXIS, ECG09: 35 DEGREES
CALCULATED R AXIS, ECG10: -9 DEGREES
CALCULATED R AXIS, ECG10: 6 DEGREES
CALCULATED T AXIS, ECG11: -156 DEGREES
CALCULATED T AXIS, ECG11: 4 DEGREES
COLOR UR: ABNORMAL
DIAGNOSIS, 93000: NORMAL
DIAGNOSIS, 93000: NORMAL
EPITH CASTS URNS QL MICRO: ABNORMAL /LPF
GLUCOSE UR STRIP.AUTO-MCNC: NEGATIVE MG/DL
HGB UR QL STRIP: ABNORMAL
KETONES UR QL STRIP.AUTO: NEGATIVE MG/DL
LEUKOCYTE ESTERASE UR QL STRIP.AUTO: ABNORMAL
NITRITE UR QL STRIP.AUTO: NEGATIVE
P-R INTERVAL, ECG05: 156 MS
PH UR STRIP: 5.5 [PH] (ref 5–8)
PROT UR STRIP-MCNC: NEGATIVE MG/DL
Q-T INTERVAL, ECG07: 284 MS
Q-T INTERVAL, ECG07: 390 MS
QRS DURATION, ECG06: 76 MS
QRS DURATION, ECG06: 78 MS
QTC CALCULATION (BEZET), ECG08: 435 MS
QTC CALCULATION (BEZET), ECG08: 461 MS
RBC #/AREA URNS HPF: ABNORMAL /HPF (ref 0–5)
SP GR UR REFRACTOMETRY: 1.02 (ref 1–1.03)
UA: UC IF INDICATED,UAUC: ABNORMAL
UROBILINOGEN UR QL STRIP.AUTO: 1 EU/DL (ref 0.2–1)
VENTRICULAR RATE, ECG03: 159 BPM
VENTRICULAR RATE, ECG03: 75 BPM
WBC URNS QL MICRO: ABNORMAL /HPF (ref 0–4)

## 2018-02-15 PROCEDURE — 87086 URINE CULTURE/COLONY COUNT: CPT | Performed by: EMERGENCY MEDICINE

## 2018-02-15 PROCEDURE — 87077 CULTURE AEROBIC IDENTIFY: CPT | Performed by: EMERGENCY MEDICINE

## 2018-02-15 PROCEDURE — 87186 SC STD MICRODIL/AGAR DIL: CPT | Performed by: EMERGENCY MEDICINE

## 2018-02-15 PROCEDURE — 81001 URINALYSIS AUTO W/SCOPE: CPT | Performed by: EMERGENCY MEDICINE

## 2018-02-15 NOTE — ED NOTES
Discharge instructions reviewed with pt and given by Dr. Josh Car. IV discontinued with catheter intact. Taken off of monitor. Pt ambulated out of ED with steady gait after declining wheelchair ride out. No other complaints voiced at time of discharge.

## 2018-02-15 NOTE — DISCHARGE INSTRUCTIONS
Atrial Fibrillation: Care Instructions  Your Care Instructions    Atrial fibrillation is an irregular and often fast heartbeat. Treating this condition is important for several reasons. It can cause blood clots, which can travel from your heart to your brain and cause a stroke. If you have a fast heartbeat, you may feel lightheaded, dizzy, and weak. An irregular heartbeat can also increase your risk for heart failure. Atrial fibrillation is often the result of another heart condition, such as high blood pressure or coronary artery disease. Making changes to improve your heart condition will help you stay healthy and active. Follow-up care is a key part of your treatment and safety. Be sure to make and go to all appointments, and call your doctor if you are having problems. It's also a good idea to know your test results and keep a list of the medicines you take. How can you care for yourself at home? Medicines  ? · Take your medicines exactly as prescribed. Call your doctor if you think you are having a problem with your medicine. You will get more details on the specific medicines your doctor prescribes. ? · If your doctor has given you a blood thinner to prevent a stroke, be sure you get instructions about how to take your medicine safely. Blood thinners can cause serious bleeding problems. ? · Do not take any vitamins, over-the-counter drugs, or herbal products without talking to your doctor first.   ? Lifestyle changes  ? · Do not smoke. Smoking can increase your chance of a stroke and heart attack. If you need help quitting, talk to your doctor about stop-smoking programs and medicines. These can increase your chances of quitting for good. ? · Eat a heart-healthy diet. ? · Stay at a healthy weight. Lose weight if you need to.   ? · Limit alcohol to 2 drinks a day for men and 1 drink a day for women. Too much alcohol can cause health problems. ? · Avoid colds and flu.  Get a pneumococcal vaccine shot. If you have had one before, ask your doctor whether you need another dose. Get a flu shot every year. If you must be around people with colds or flu, wash your hands often. Activity  ? · If your doctor recommends it, get more exercise. Walking is a good choice. Bit by bit, increase the amount you walk every day. Try for at least 30 minutes on most days of the week. You also may want to swim, bike, or do other activities. Your doctor may suggest that you join a cardiac rehabilitation program so that you can have help increasing your physical activity safely. ? · Start light exercise if your doctor says it is okay. Even a small amount will help you get stronger, have more energy, and manage stress. Walking is an easy way to get exercise. Start out by walking a little more than you did in the hospital. Gradually increase the amount you walk. ? · When you exercise, watch for signs that your heart is working too hard. You are pushing too hard if you cannot talk while you are exercising. If you become short of breath or dizzy or have chest pain, sit down and rest immediately. ? · Check your pulse regularly. Place two fingers on the artery at the palm side of your wrist, in line with your thumb. If your heartbeat seems uneven or fast, talk to your doctor. When should you call for help? Call 911 anytime you think you may need emergency care. For example, call if:  ? · You have symptoms of a heart attack. These may include:  ¨ Chest pain or pressure, or a strange feeling in the chest.  ¨ Sweating. ¨ Shortness of breath. ¨ Nausea or vomiting. ¨ Pain, pressure, or a strange feeling in the back, neck, jaw, or upper belly or in one or both shoulders or arms. ¨ Lightheadedness or sudden weakness. ¨ A fast or irregular heartbeat. After you call 911, the  may tell you to chew 1 adult-strength or 2 to 4 low-dose aspirin. Wait for an ambulance. Do not try to drive yourself.    ? · You have symptoms of a stroke. These may include:  ¨ Sudden numbness, tingling, weakness, or loss of movement in your face, arm, or leg, especially on only one side of your body. ¨ Sudden vision changes. ¨ Sudden trouble speaking. ¨ Sudden confusion or trouble understanding simple statements. ¨ Sudden problems with walking or balance. ¨ A sudden, severe headache that is different from past headaches. ? · You passed out (lost consciousness). ?Call your doctor now or seek immediate medical care if:  ? · You have new or increased shortness of breath. ? · You feel dizzy or lightheaded, or you feel like you may faint. ? · Your heart rate becomes irregular. ? · You can feel your heart flutter in your chest or skip heartbeats. Tell your doctor if these symptoms are new or worse. ? Watch closely for changes in your health, and be sure to contact your doctor if you have any problems. Where can you learn more? Go to http://trevor-darrick.info/. Enter U020 in the search box to learn more about \"Atrial Fibrillation: Care Instructions. \"  Current as of: September 21, 2016  Content Version: 11.4  © 0987-8961 [employer]. Care instructions adapted under license by [employer] (which disclaims liability or warranty for this information). If you have questions about a medical condition or this instruction, always ask your healthcare professional. [place] [address] any warranty or liability for your use of this information. Cardiac Arrhythmia: Care Instructions  Your Care Instructions    A cardiac arrhythmia is a change in the normal rhythm of the heart. Your heart may beat too fast or too slow or beat with an irregular or skipping rhythm. A change in the heart's rhythm may feel like a really strong heartbeat or a fluttering in your chest. A severe heart rhythm problem can keep the body from getting the blood it needs.  This can result in shortness of breath, lightheadedness, and fainting. You may take medicine to treat your condition. Your doctor may recommend a pacemaker or recommend catheter ablation to destroy small parts of the heart that are causing a rhythm problem. Another possible treatment is an implantable cardioverter-defibrillator (ICD). An ICD is a device that gives the heart a shock to return the heart to a normal rhythm. Follow-up care is a key part of your treatment and safety. Be sure to make and go to all appointments, and call your doctor if you are having problems. It's also a good idea to know your test results and keep a list of the medicines you take. How can you care for yourself at home? ?General care  ? · Be safe with medicines. Take your medicines exactly as prescribed. Call your doctor if you think you are having a problem with your medicine. You will get more details on the specific medicines your doctor prescribes. ? · If you received a pacemaker or an ICD, you will get a fact sheet about it. ? · Wear medical alert jewelry that says you have an abnormal heart rhythm. You can buy this at most [employer]. ? Lifestyle changes  ? · Eat a heart-healthy diet. ? · Stay at a healthy weight. Lose weight if you need to. ? · Avoid nicotine, too much alcohol, and illegal drugs (meth, speed, and cocaine). Also, get enough sleep and do not overeat. ? · Ask your doctor whether you can take over-the-counter medicines (such as decongestants). These can make your heart beat fast.   ? · Talk to your doctor about any limits to activities, such as driving, or tasks where you use power tools or ladders. Activity  ? · Start light exercise if your doctor says you can. Even a small amount will help you get stronger, have more energy, and manage your stress. ? · Get regular exercise. Try for 30 minutes on most days of the week. Ask your doctor what level of exercise is safe for you.  If activity is not likely to cause health problems, you probably do not have limits on the type or level of activity that you can do. You may want to walk, swim, bike, or do other activities. ? · When you exercise, watch for signs that your heart is working too hard. You are pushing too hard if you cannot talk while you exercise. If you become short of breath or dizzy or have chest pain, sit down and rest.   ? · Check your pulse daily. Place two fingers on the artery at the palm side of your wrist, in line with your thumb. If your heartbeat seems uneven, talk to your doctor. When should you call for help? Call 911 anytime you think you may need emergency care. For example, call if:  ? · You have symptoms of sudden heart failure. These may include:  ¨ Severe trouble breathing. ¨ A fast or irregular heartbeat. ¨ Coughing up pink, foamy mucus. ¨ You passed out. ? · You have signs of a stroke. These include:  ¨ Sudden numbness, paralysis, or weakness in your face, arm, or leg, especially on only one side of your body. ¨ New problems with walking or balance. ¨ Sudden vision changes. ¨ Drooling or slurred speech. ¨ New problems speaking or understanding simple statements, or feeling confused. ¨ A sudden, severe headache that is different from past headaches. ?Call your doctor now or seek immediate medical care if:  ? · You have new or changed symptoms of heart failure, such as:  ¨ New or increased shortness of breath. ¨ New or worse swelling in your legs, ankles, or feet. ¨ Sudden weight gain, such as more than 2 to 3 pounds in a day or 5 pounds in a week. (Your doctor may suggest a different range of weight gain.)  ¨ Feeling dizzy or lightheaded or like you may faint. ¨ Feeling so tired or weak that you cannot do your usual activities. ¨ Not sleeping well. Shortness of breath wakes you at night. You need extra pillows to prop yourself up to breathe easier. ? Watch closely for changes in your health, and be sure to contact your doctor if:  ? · You do not get better as expected. Where can you learn more? Go to http://trevor-darrick.info/. Enter G770 in the search box to learn more about \"Cardiac Arrhythmia: Care Instructions. \"  Current as of: September 21, 2016  Content Version: 11.4  © 3912-8892 [employer]. Care instructions adapted under license by [employer] (which disclaims liability or warranty for this information). If you have questions about a medical condition or this instruction, always ask your healthcare professional. [address] any warranty or liability for your use of this information.

## 2018-02-15 NOTE — ED NOTES
Pt ringing out on call bell. Requesting to use restroom. Provided with urine cup and instructions on how to obtain.

## 2018-02-15 NOTE — ED PROVIDER NOTES
EMERGENCY DEPARTMENT HISTORY AND PHYSICAL EXAM      Date: 2/14/2018  Patient Name: Jose Last    History of Presenting Illness     Chief Complaint   Patient presents with    Irregular Heart Beat     Feels like her heart is racing       History Provided By: Patient    HPI: Jose Last is a 66 y.o. female, pmhx a-fib, who presents ambulatory to the ED c/o new onset heart palpitations with intermittent lightheadedness x 1900 this evening. She notes a hx of similar sxs with her hx of atrial fibrillation. Pt states she is scheduled for a cardiac stress test on 2/19/18. She reports she was instructed to stop taking her Metoprolol 2 days prior to the exam, but wanted to try and prevent new heart palpitations. Pt states she decided to cut her medications back to 12.5mg daily, rather than the rx'd 12.5mg BID, over the last 2 days. She otherwise specifically denies any recent fevers, chills, nausea, vomiting, diarrhea, abd pain, CP, SOB, urinary sxs, changes in BM, or headache. PCP: None   Cardiology: Pa[facility]    PMHx: Significant for a-fib  PSHx: Significant for tubal ligation  Social Hx: +tobacco (1 ppd), -EtOH, -Illicit Drugs    There are no other complaints, changes, or physical findings at this time. Current Outpatient Prescriptions   Medication Sig Dispense Refill    metoprolol succinate (TOPROL-XL) 25 mg XL tablet Take 0.5 Tabs by mouth nightly. Stop Lopressor 45 Tab 3    aspirin delayed-release 81 mg tablet Take 1 Tab by mouth daily.  Over the counter for atrial fibrillation 90 Tab 11       Past History     Past Medical History:  Past Medical History:   Diagnosis Date    Atrial fibrillation [facility])        Past Surgical History:  Past Surgical History:   Procedure Laterality Date    HX TUBAL LIGATION         Family History:  Family History   Problem Relation Age of Onset    Other Other      no known FH of CAD       Social History:  Social History   Substance Use Topics    Smoking status: Current Every Day Smoker     Packs/day: 1.00     Years: 35.00     Types: Cigarettes    Smokeless tobacco: Never Used    Alcohol use No       Allergies: Allergies   Allergen Reactions    Beef Containing Products Hives    Pork/Porcine Containing Products Hives    Macrobid [Nitrofurantoin Monohyd/M-Cryst] Hives    Pcn [Penicillins] Hives         Review of Systems   Review of Systems   Constitutional: Negative. Negative for fever. Eyes: Negative. Respiratory: Negative. Negative for shortness of breath. Cardiovascular: Positive for palpitations. Negative for chest pain. Gastrointestinal: Negative for abdominal pain, nausea and vomiting. Endocrine: Negative. Genitourinary: Negative. Negative for difficulty urinating, dysuria and hematuria. Musculoskeletal: Negative. Skin: Negative. Allergic/Immunologic: Negative. Neurological: Positive for light-headedness. Psychiatric/Behavioral: Negative for suicidal ideas. All other systems reviewed and are negative. Physical Exam   Physical Exam   Constitutional: She is oriented to person, place, and time. She appears well-developed and well-nourished. No distress. HENT:   Head: Normocephalic and atraumatic. Nose: Nose normal.   Eyes: Conjunctivae and EOM are normal. No scleral icterus. Neck: Normal range of motion. No tracheal deviation present. Cardiovascular: Normal heart sounds and intact distal pulses. An irregularly irregular rhythm present. Tachycardia present. Exam reveals no friction rub. No murmur heard. Pulmonary/Chest: Effort normal and breath sounds normal. No stridor. No respiratory distress. She has no wheezes. She has no rales. Abdominal: Soft. Bowel sounds are normal. She exhibits no distension. There is no tenderness. There is no rebound. Musculoskeletal: Normal range of motion. She exhibits no tenderness. Neurological: She is alert and oriented to person, place, and time. No cranial nerve deficit.    Skin: Skin is warm and dry. No rash noted. She is not diaphoretic. There is pallor. Psychiatric: Her speech is normal and behavior is normal. Judgment and thought content normal. Her mood appears anxious. Cognition and memory are normal.   Nursing note and vitals reviewed. Diagnostic Study Results     Labs -     Recent Results (from the past 12 hour(s))   EKG, 12 LEAD, INITIAL    Collection Time: 02/14/18  9:45 PM   Result Value Ref Range    Ventricular Rate 159 BPM    Atrial Rate 166 BPM    QRS Duration 78 ms    Q-T Interval 284 ms    QTC Calculation (Bezet) 461 ms    Calculated R Axis 6 degrees    Calculated T Axis -156 degrees    Diagnosis       Atrial fibrillation with rapid ventricular response  ST & T wave abnormality, consider lateral ischemia  When compared with ECG of 26-DEC-2017 02:27,  Atrial fibrillation has replaced Sinus rhythm  Vent. rate has increased BY  81 BPM  ST now depressed in Lateral leads  T wave inversion more evident in Lateral leads     CBC WITH AUTOMATED DIFF    Collection Time: 02/14/18 10:06 PM   Result Value Ref Range    WBC 11.0 3.6 - 11.0 K/uL    RBC 5.47 (H) 3.80 - 5.20 M/uL    HGB 16.0 11.5 - 16.0 g/dL    HCT 49.0 (H) 35.0 - 47.0 %    MCV 89.6 80.0 - 99.0 FL    MCH 29.3 26.0 - 34.0 PG    MCHC 32.7 30.0 - 36.5 g/dL    RDW 14.0 11.5 - 14.5 %    PLATELET 707 239 - 843 K/uL    MPV 10.4 8.9 - 12.9 FL    NRBC 0.0 0  WBC    ABSOLUTE NRBC 0.00 0.00 - 0.01 K/uL    NEUTROPHILS 66 32 - 75 %    LYMPHOCYTES 23 12 - 49 %    MONOCYTES 9 5 - 13 %    EOSINOPHILS 1 0 - 7 %    BASOPHILS 0 0 - 1 %    IMMATURE GRANULOCYTES 0 0.0 - 0.5 %    ABS. NEUTROPHILS 7.3 1.8 - 8.0 K/UL    ABS. LYMPHOCYTES 2.6 0.8 - 3.5 K/UL    ABS. MONOCYTES 1.0 0.0 - 1.0 K/UL    ABS. EOSINOPHILS 0.1 0.0 - 0.4 K/UL    ABS. BASOPHILS 0.0 0.0 - 0.1 K/UL    ABS. IMM.  GRANS. 0.0 0.00 - 0.04 K/UL    DF AUTOMATED     METABOLIC PANEL, COMPREHENSIVE    Collection Time: 02/14/18 10:06 PM   Result Value Ref Range    Sodium 140 136 - 145 mmol/L    Potassium 3.5 3.5 - 5.1 mmol/L    Chloride 106 97 - 108 mmol/L    CO2 26 21 - 32 mmol/L    Anion gap 8 5 - 15 mmol/L    Glucose 111 (H) 65 - 100 mg/dL    BUN 14 6 - 20 MG/DL    Creatinine 0.83 0.55 - 1.02 MG/DL    BUN/Creatinine ratio 17 12 - 20      GFR est AA >60 >60 ml/min/1.73m2    GFR est non-AA >60 >60 ml/min/1.73m2    Calcium 8.9 8.5 - 10.1 MG/DL    Bilirubin, total 0.4 0.2 - 1.0 MG/DL    ALT (SGPT) 18 12 - 78 U/L    AST (SGOT) 13 (L) 15 - 37 U/L    Alk. phosphatase 93 45 - 117 U/L    Protein, total 7.9 6.4 - 8.2 g/dL    Albumin 3.8 3.5 - 5.0 g/dL    Globulin 4.1 (H) 2.0 - 4.0 g/dL    A-G Ratio 0.9 (L) 1.1 - 2.2     CK W/ CKMB & INDEX    Collection Time: 02/14/18 10:06 PM   Result Value Ref Range    CK 66 26 - 192 U/L    CK - MB <1.0 <3.6 NG/ML    CK-MB Index Cannot be calculated 0 - 2.5     TROPONIN I    Collection Time: 02/14/18 10:06 PM   Result Value Ref Range    Troponin-I, Qt. <0.04 <0.05 ng/mL   NT-PRO BNP    Collection Time: 02/14/18 10:06 PM   Result Value Ref Range    NT pro-BNP 91 0 - 125 PG/ML   MAGNESIUM    Collection Time: 02/14/18 10:06 PM   Result Value Ref Range    Magnesium 2.2 1.6 - 2.4 mg/dL   EKG, 12 LEAD, INITIAL    Collection Time: 02/14/18 11:10 PM   Result Value Ref Range    Ventricular Rate 75 BPM    Atrial Rate 75 BPM    P-R Interval 156 ms    QRS Duration 76 ms    Q-T Interval 390 ms    QTC Calculation (Bezet) 435 ms    Calculated P Axis 35 degrees    Calculated R Axis -9 degrees    Calculated T Axis 4 degrees    Diagnosis       Normal sinus rhythm  Nonspecific ST and T wave abnormality  Abnormal ECG  When compared with ECG of 14-FEB-2018 21:45,  MANUAL COMPARISON REQUIRED, DATA IS UNCONFIRMED         Radiologic Studies -   No orders to display     CT Results  (Last 48 hours)    None        CXR Results  (Last 48 hours)    None            Medical Decision Making   I am the first provider for this patient.     I reviewed the vital signs, available nursing notes, past medical history, past surgical history, family history and social history. Vital Signs-Reviewed the patient's vital signs. Patient Vitals for the past 12 hrs:   Temp Pulse Resp BP SpO2   02/15/18 0100 - 64 14 - 97 %   02/15/18 0001 - 67 13 92/52 98 %   02/14/18 2330 - 75 18 99/50 97 %   02/14/18 2300 - 80 14 101/61 98 %   02/14/18 2235 - (!) 155 15 97/57 97 %   02/14/18 2232 - (!) 170 - 97/57 -   02/14/18 2207 - (!) 165 13 - 99 %   02/14/18 2206 - (!) 150 25 104/67 -   02/14/18 2152 97.6 °F (36.4 °C) (!) 160 22 (!) 121/96 96 %       Pulse Oximetry Analysis - 97% on RA    Cardiac Monitor:   Rate: 134 bpm  Rhythm: Atrial Fibrillation    Records Reviewed: Nursing Notes, Old Medical Records, Previous electrocardiograms, Previous Radiology Studies and Previous Laboratory Studies    Provider Notes (Medical Decision Making):     DDX:  afib with rvr, acs, electrolyte abnormality, medication non compliance    Plan:  Ekg, labs, dilt    Impression:  afib with rvr    ED Course:   Initial assessment performed. The patients presenting problems have been discussed, and they are in agreement with the care plan formulated and outlined with them. I have encouraged them to ask questions as they arise throughout their visit. I reviewed our electronic medical record system for any past medical records that were available that may contribute to the patients current condition, the nursing notes and and vital signs from today's visit    Nursing notes will be reviewed as they become available in realtime while the pt has been in the ED. Audrey Rosenthal MD    I have spent 3-7 minutes discussing the medical risks of prolonged smoking habits and advised the patient of the benefits of the cessation of smoking, providing specific suggestions on how to quit. Audrey Rosenthal MD    EKG interpretation 1177: afib with rvr, nl Axis, rate 1259; , QRS 78, QTc 461; possible acute ischemia;  Audrey Rosenthal MD      I personally reviewed pt's imaging. Official read by radiology listed below. Nichole Duarte MD    PROGRESS NOTE:  10:59 PM  Pt reevaluated. Pt converted back into NSR while in the room. She states her lightheadedness is resolved with the rhythm change. BP momentarily dropped to 74/48. Will hang IVF bolus and continue to monitor. Written by Ricardo Emery ED Scribe, as dictated by Nichole Duarte MD    EKG interpretation : NSR, nl Axis, rate 75; , QRS 76, QTc 435; no acute ischemia; Nichole Duarte MD      2:03 AM  Progress note:  Pt noted to be feeling better, remains in sinus rhythm at this time , ready for discharge. Discussed lab findings with pt. Pt advised to continue taking her Metoprolol as prescribed and call her cardiologist in the morning. Pt will follow up as instructed. All questions have been answered, pt voiced understanding and agreement with plan. If narcotics were prescribed, pt was advised not to drive or operate heavy machinery. If abx were prescribed, pt advised that diarrhea and rash are possible side effects of the medications. Specific return precautions provided in addition to instructions for pt to return to the ED immediately should sx worsen at any time. Nichole Duarte MD      Diagnosis     Clinical Impression:   1. Paroxysmal atrial fibrillation with rapid ventricular response (HCC)    2. Palpitations        PLAN:  1. Current Discharge Medication List        2. Follow-up Information     Follow up With Details Comments Contact Info    [facility] EMERGENCY DEPT  As needed [facility] [address] Alejandra Ashby MD Call today  [address] [place] [address]. [phone]  [phone]          Return to ED if worse     Disposition:    DISCHARGE NOTE:  2:12 AM  The patient's results have been reviewed with family and/or caregiver.  They verbally convey their understanding and agreement of the patient's signs, symptoms, diagnosis, treatment, and prognosis. They additionally agree to follow up as recommended in the discharge instructions or to return to the Emergency Room should the patient's condition change prior to their follow-up appointment. The family and/or caregiver verbally agrees with the care-plan and all of their questions have been answered. The discharge instructions have also been provided to the them along with educational information regarding the patient's diagnosis and a list of reasons why the patient would want to return to the ER prior to their follow-up appointment should their condition change. Written by Rose Fisher, ED Scribe, as dictated by Chhaya Ennis MD.             Attestations: This note is prepared by Rose Fisher, acting as Scribe for MD Chhaya Delgado MD : The scribe's documentation has been prepared under my direction and personally reviewed by me in its entirety. I confirm that the note above accurately reflects all work, treatment, procedures, and medical decision making performed by me. This note will not be viewable in [address]

## 2018-02-15 NOTE — ED NOTES
Pt updated on plan of care. Disconnected from monitor. Ambulated with steady gait to obtain urine sample. Pt forgot to obtain sample in cup. Will attempt to obtain another urine once fluids completed.

## 2018-02-15 NOTE — ED NOTES
Pt resting in stretcher. Reporting hx of a fib. Reporting began to have a \"fluttering,\" feeling in her chest around 1900 tonight. Did not subside. Wanted to come in and be seen. Is having associated sob. Denying n/v or chest pain at this time.

## 2018-02-17 LAB
BACTERIA SPEC CULT: ABNORMAL
CC UR VC: ABNORMAL
SERVICE CMNT-IMP: ABNORMAL

## 2018-02-17 RX ORDER — CIPROFLOXACIN 500 MG/1
500 TABLET ORAL 2 TIMES DAILY
Qty: 14 TAB | Refills: 0 | Status: SHIPPED | OUTPATIENT
Start: 2018-02-17 | End: 2018-02-24

## 2018-02-17 NOTE — PROGRESS NOTES
Spoke with pt regarding results. Pt is afebrile and without urinary symptoms. Suspect contaminant secondary to epithelial cells. Wrote cipro and pt is to follow-up with PCP this week for repeat UA. Pt expresses understanding. Provided customary ED return precautions.

## 2018-02-19 ENCOUNTER — CLINICAL SUPPORT (OUTPATIENT)
Dept: CARDIOLOGY CLINIC | Age: 58
End: 2018-02-19

## 2018-02-19 DIAGNOSIS — R00.2 HEART PALPITATIONS: ICD-10-CM

## 2018-02-19 DIAGNOSIS — I49.9 IRREGULAR HEART BEAT: ICD-10-CM

## 2018-02-19 DIAGNOSIS — R53.83 FATIGUE, UNSPECIFIED TYPE: ICD-10-CM

## 2018-02-19 DIAGNOSIS — R07.2 PRECORDIAL CHEST PAIN: ICD-10-CM

## 2018-02-19 DIAGNOSIS — I48.0 PAF (PAROXYSMAL ATRIAL FIBRILLATION) (HCC): ICD-10-CM

## 2018-02-19 DIAGNOSIS — I49.9 IRREGULAR HEART BEAT: Primary | ICD-10-CM

## 2018-02-19 NOTE — PROGRESS NOTES
Patient received a 2 week event monitor. Instructions given verbally as well as an instruction sheet. Pt verbalized understanding.

## 2018-02-19 NOTE — PROGRESS NOTES
Identified patient using full name and . Patient education for testing complete.  Patient verbalized understanding    Arleen Vallejo RN

## 2018-02-21 LAB
1,25(OH)2D3 SERPL-MCNC: 34.8 PG/ML (ref 19.9–79.3)
ALBUMIN SERPL-MCNC: 4.2 G/DL (ref 3.5–5.5)
ALBUMIN/GLOB SERPL: 1.7 {RATIO} (ref 1.2–2.2)
ALP SERPL-CCNC: 81 IU/L (ref 39–117)
ALT SERPL-CCNC: 16 IU/L (ref 0–32)
AST SERPL-CCNC: 16 IU/L (ref 0–40)
BASOPHILS # BLD AUTO: 0 X10E3/UL (ref 0–0.2)
BASOPHILS NFR BLD AUTO: 0 %
BILIRUB SERPL-MCNC: 0.4 MG/DL (ref 0–1.2)
BUN SERPL-MCNC: 14 MG/DL (ref 6–24)
BUN/CREAT SERPL: 17 (ref 9–23)
CALCIUM SERPL-MCNC: 9.4 MG/DL (ref 8.7–10.2)
CHLORIDE SERPL-SCNC: 103 MMOL/L (ref 96–106)
CHOLEST SERPL-MCNC: 154 MG/DL (ref 100–199)
CO2 SERPL-SCNC: 21 MMOL/L (ref 18–29)
CREAT SERPL-MCNC: 0.82 MG/DL (ref 0.57–1)
EOSINOPHIL # BLD AUTO: 0.2 X10E3/UL (ref 0–0.4)
EOSINOPHIL NFR BLD AUTO: 2 %
ERYTHROCYTE [DISTWIDTH] IN BLOOD BY AUTOMATED COUNT: 14.7 % (ref 12.3–15.4)
FOLATE SERPL-MCNC: 3.2 NG/ML
FT4I SERPL CALC-MCNC: 2.9 (ref 1.2–4.9)
GFR SERPLBLD CREATININE-BSD FMLA CKD-EPI: 80 ML/MIN/{1.73_M2}
GFR SERPLBLD CREATININE-BSD FMLA CKD-EPI: 93 ML/MIN/{1.73_M2}
GLOBULIN SER CALC-MCNC: 2.5 G/L (ref 1.5–4.5)
GLUCOSE SERPL-MCNC: 110 MG/DL (ref 65–99)
HCT VFR BLD AUTO: 44.3 % (ref 34–46.6)
HDLC SERPL-MCNC: 49 MG/DL
HGB BLD-MCNC: 14.8 G/DL (ref 11.1–15.9)
IMM GRANULOCYTES # BLD: 0 X10E3/UL (ref 0–0.1)
IMM GRANULOCYTES NFR BLD: 0 %
INTERPRETATION, 910389: NORMAL
LDLC SERPL CALC-MCNC: 91 MG/DL (ref 0–99)
LYMPHOCYTES # BLD AUTO: 1.7 X10E3/UL (ref 0.7–3.1)
LYMPHOCYTES NFR BLD AUTO: 19 %
MCH RBC QN AUTO: 29.7 PG (ref 26.6–33)
MCHC RBC AUTO-ENTMCNC: 33.4 G/DL (ref 31.5–35.7)
MCV RBC AUTO: 89 FL (ref 79–97)
MONOCYTES # BLD AUTO: 0.6 X10E3/UL (ref 0.1–0.9)
MONOCYTES NFR BLD AUTO: 7 %
NEUTROPHILS # BLD AUTO: 6.4 X10E3/UL (ref 1.4–7)
NEUTROPHILS NFR BLD AUTO: 72 %
PLATELET # BLD AUTO: 301 X10E3/UL (ref 150–379)
POTASSIUM SERPL-SCNC: 4 MMOL/L (ref 3.5–5.2)
PROT SERPL-MCNC: 6.7 G/DL (ref 6–8.5)
RBC # BLD AUTO: 4.99 X10E6/UL (ref 3.77–5.28)
SODIUM SERPL-SCNC: 143 MMOL/L (ref 134–144)
T3RU NFR SERPL: 28 % (ref 24–39)
T4 SERPL-MCNC: 10.4 UG/DL (ref 4.5–12)
TRIGL SERPL-MCNC: 68 MG/DL (ref 0–149)
TSH SERPL DL<=0.005 MIU/L-ACNC: 2.01 UIU/ML (ref 0.45–4.5)
VIT B12 SERPL-MCNC: 323 PG/ML (ref 232–1245)
VLDLC SERPL CALC-MCNC: 14 MG/DL (ref 5–40)
WBC # BLD AUTO: 8.9 X10E3/UL (ref 3.4–10.8)

## 2018-02-27 ENCOUNTER — TELEPHONE (OUTPATIENT)
Dept: CARDIOLOGY CLINIC | Age: 58
End: 2018-02-27

## 2018-02-28 NOTE — TELEPHONE ENCOUNTER
Stress test without evidence of blockages in the arteries of the heart at the heart rate that was achieved. Awaiting event monitor.

## 2018-02-28 NOTE — TELEPHONE ENCOUNTER
Verified patient with two identifiers. Pt informed of stress test results. Will call with event monitor results once complete. Pt verbalized understanding.

## 2018-03-04 ENCOUNTER — HOSPITAL ENCOUNTER (INPATIENT)
Age: 58
LOS: 1 days | Discharge: HOME | DRG: 309 | End: 2018-03-06
Attending: EMERGENCY MEDICINE | Admitting: INTERNAL MEDICINE
Payer: COMMERCIAL

## 2018-03-04 ENCOUNTER — APPOINTMENT (OUTPATIENT)
Dept: GENERAL RADIOLOGY | Age: 58
DRG: 309 | End: 2018-03-04
Attending: EMERGENCY MEDICINE
Payer: COMMERCIAL

## 2018-03-04 DIAGNOSIS — I48.91 ATRIAL FIBRILLATION, UNSPECIFIED TYPE (HCC): Primary | ICD-10-CM

## 2018-03-04 DIAGNOSIS — N39.0 URINARY TRACT INFECTION WITHOUT HEMATURIA, SITE UNSPECIFIED: ICD-10-CM

## 2018-03-04 LAB
ALBUMIN SERPL-MCNC: 3.4 G/DL (ref 3.5–5)
ALBUMIN/GLOB SERPL: 1 {RATIO} (ref 1.1–2.2)
ALP SERPL-CCNC: 89 U/L (ref 45–117)
ALT SERPL-CCNC: 23 U/L (ref 12–78)
ANION GAP SERPL CALC-SCNC: 6 MMOL/L (ref 5–15)
APPEARANCE UR: CLEAR
AST SERPL-CCNC: 13 U/L (ref 15–37)
BACTERIA URNS QL MICRO: ABNORMAL /HPF
BASOPHILS # BLD: 0 K/UL (ref 0–0.1)
BASOPHILS NFR BLD: 1 % (ref 0–1)
BILIRUB SERPL-MCNC: 0.4 MG/DL (ref 0.2–1)
BILIRUB UR QL: NEGATIVE
BNP SERPL-MCNC: 213 PG/ML (ref 0–125)
BUN SERPL-MCNC: 12 MG/DL (ref 6–20)
BUN/CREAT SERPL: 15 (ref 12–20)
CALCIUM SERPL-MCNC: 9.2 MG/DL (ref 8.5–10.1)
CHLORIDE SERPL-SCNC: 110 MMOL/L (ref 97–108)
CO2 SERPL-SCNC: 26 MMOL/L (ref 21–32)
COLOR UR: ABNORMAL
CREAT SERPL-MCNC: 0.79 MG/DL (ref 0.55–1.02)
DIFFERENTIAL METHOD BLD: NORMAL
EOSINOPHIL # BLD: 0.1 K/UL (ref 0–0.4)
EOSINOPHIL NFR BLD: 1 % (ref 0–7)
EPITH CASTS URNS QL MICRO: ABNORMAL /LPF
ERYTHROCYTE [DISTWIDTH] IN BLOOD BY AUTOMATED COUNT: 13.8 % (ref 11.5–14.5)
GLOBULIN SER CALC-MCNC: 3.5 G/DL (ref 2–4)
GLUCOSE SERPL-MCNC: 124 MG/DL (ref 65–100)
GLUCOSE UR STRIP.AUTO-MCNC: NEGATIVE MG/DL
HCT VFR BLD AUTO: 42.4 % (ref 35–47)
HGB BLD-MCNC: 14 G/DL (ref 11.5–16)
HGB UR QL STRIP: ABNORMAL
HYALINE CASTS URNS QL MICRO: ABNORMAL /LPF (ref 0–5)
IMM GRANULOCYTES # BLD: 0 K/UL (ref 0–0.04)
IMM GRANULOCYTES NFR BLD AUTO: 0 % (ref 0–0.5)
KETONES UR QL STRIP.AUTO: NEGATIVE MG/DL
LEUKOCYTE ESTERASE UR QL STRIP.AUTO: ABNORMAL
LYMPHOCYTES # BLD: 1.3 K/UL (ref 0.8–3.5)
LYMPHOCYTES NFR BLD: 20 % (ref 12–49)
MCH RBC QN AUTO: 29.4 PG (ref 26–34)
MCHC RBC AUTO-ENTMCNC: 33 G/DL (ref 30–36.5)
MCV RBC AUTO: 88.9 FL (ref 80–99)
MONOCYTES # BLD: 0.6 K/UL (ref 0–1)
MONOCYTES NFR BLD: 10 % (ref 5–13)
NEUTS SEG # BLD: 4.4 K/UL (ref 1.8–8)
NEUTS SEG NFR BLD: 68 % (ref 32–75)
NITRITE UR QL STRIP.AUTO: NEGATIVE
NRBC # BLD: 0 K/UL (ref 0–0.01)
NRBC BLD-RTO: 0 PER 100 WBC
PH UR STRIP: 6.5 [PH] (ref 5–8)
PLATELET # BLD AUTO: 248 K/UL (ref 150–400)
PMV BLD AUTO: 10 FL (ref 8.9–12.9)
POTASSIUM SERPL-SCNC: 3.6 MMOL/L (ref 3.5–5.1)
PROT SERPL-MCNC: 6.9 G/DL (ref 6.4–8.2)
PROT UR STRIP-MCNC: NEGATIVE MG/DL
RBC # BLD AUTO: 4.77 M/UL (ref 3.8–5.2)
RBC #/AREA URNS HPF: ABNORMAL /HPF (ref 0–5)
SODIUM SERPL-SCNC: 142 MMOL/L (ref 136–145)
SP GR UR REFRACTOMETRY: 1.01 (ref 1–1.03)
UA: UC IF INDICATED,UAUC: ABNORMAL
UROBILINOGEN UR QL STRIP.AUTO: 0.2 EU/DL (ref 0.2–1)
WBC # BLD AUTO: 6.5 K/UL (ref 3.6–11)
WBC URNS QL MICRO: ABNORMAL /HPF (ref 0–4)

## 2018-03-04 PROCEDURE — 87086 URINE CULTURE/COLONY COUNT: CPT | Performed by: EMERGENCY MEDICINE

## 2018-03-04 PROCEDURE — 96366 THER/PROPH/DIAG IV INF ADDON: CPT

## 2018-03-04 PROCEDURE — 74011250637 HC RX REV CODE- 250/637: Performed by: EMERGENCY MEDICINE

## 2018-03-04 PROCEDURE — 96376 TX/PRO/DX INJ SAME DRUG ADON: CPT

## 2018-03-04 PROCEDURE — 99218 HC RM OBSERVATION: CPT

## 2018-03-04 PROCEDURE — 99285 EMERGENCY DEPT VISIT HI MDM: CPT

## 2018-03-04 PROCEDURE — 96375 TX/PRO/DX INJ NEW DRUG ADDON: CPT

## 2018-03-04 PROCEDURE — 83880 ASSAY OF NATRIURETIC PEPTIDE: CPT | Performed by: EMERGENCY MEDICINE

## 2018-03-04 PROCEDURE — 74011000258 HC RX REV CODE- 258: Performed by: EMERGENCY MEDICINE

## 2018-03-04 PROCEDURE — 93005 ELECTROCARDIOGRAM TRACING: CPT

## 2018-03-04 PROCEDURE — 80053 COMPREHEN METABOLIC PANEL: CPT | Performed by: EMERGENCY MEDICINE

## 2018-03-04 PROCEDURE — 87186 SC STD MICRODIL/AGAR DIL: CPT | Performed by: EMERGENCY MEDICINE

## 2018-03-04 PROCEDURE — 74011000250 HC RX REV CODE- 250: Performed by: EMERGENCY MEDICINE

## 2018-03-04 PROCEDURE — 81001 URINALYSIS AUTO W/SCOPE: CPT | Performed by: EMERGENCY MEDICINE

## 2018-03-04 PROCEDURE — 74011250636 HC RX REV CODE- 250/636: Performed by: EMERGENCY MEDICINE

## 2018-03-04 PROCEDURE — 96361 HYDRATE IV INFUSION ADD-ON: CPT

## 2018-03-04 PROCEDURE — 96368 THER/DIAG CONCURRENT INF: CPT

## 2018-03-04 PROCEDURE — 96365 THER/PROPH/DIAG IV INF INIT: CPT

## 2018-03-04 PROCEDURE — 36415 COLL VENOUS BLD VENIPUNCTURE: CPT | Performed by: EMERGENCY MEDICINE

## 2018-03-04 PROCEDURE — 71045 X-RAY EXAM CHEST 1 VIEW: CPT

## 2018-03-04 PROCEDURE — 74011250637 HC RX REV CODE- 250/637: Performed by: INTERNAL MEDICINE

## 2018-03-04 PROCEDURE — 85025 COMPLETE CBC W/AUTO DIFF WBC: CPT | Performed by: EMERGENCY MEDICINE

## 2018-03-04 PROCEDURE — 87077 CULTURE AEROBIC IDENTIFY: CPT | Performed by: EMERGENCY MEDICINE

## 2018-03-04 RX ORDER — DILTIAZEM HYDROCHLORIDE 5 MG/ML
15 INJECTION INTRAVENOUS
Status: COMPLETED | OUTPATIENT
Start: 2018-03-04 | End: 2018-03-04

## 2018-03-04 RX ORDER — SODIUM CHLORIDE 9 MG/ML
1000 INJECTION, SOLUTION INTRAVENOUS ONCE
Status: COMPLETED | OUTPATIENT
Start: 2018-03-04 | End: 2018-03-04

## 2018-03-04 RX ORDER — METOPROLOL TARTRATE 25 MG/1
12.5 TABLET, FILM COATED ORAL
Status: COMPLETED | OUTPATIENT
Start: 2018-03-04 | End: 2018-03-04

## 2018-03-04 RX ORDER — SODIUM CHLORIDE 0.9 % (FLUSH) 0.9 %
5-10 SYRINGE (ML) INJECTION EVERY 8 HOURS
Status: DISCONTINUED | OUTPATIENT
Start: 2018-03-04 | End: 2018-03-06 | Stop reason: HOSPADM

## 2018-03-04 RX ORDER — MAGNESIUM SULFATE 1 G/100ML
1 INJECTION INTRAVENOUS ONCE
Status: COMPLETED | OUTPATIENT
Start: 2018-03-04 | End: 2018-03-04

## 2018-03-04 RX ORDER — SODIUM CHLORIDE 0.9 % (FLUSH) 0.9 %
5-10 SYRINGE (ML) INJECTION AS NEEDED
Status: DISCONTINUED | OUTPATIENT
Start: 2018-03-04 | End: 2018-03-06 | Stop reason: HOSPADM

## 2018-03-04 RX ORDER — DILTIAZEM HYDROCHLORIDE 5 MG/ML
20 INJECTION INTRAVENOUS
Status: COMPLETED | OUTPATIENT
Start: 2018-03-04 | End: 2018-03-04

## 2018-03-04 RX ADMIN — DILTIAZEM HYDROCHLORIDE 5 MG/HR: 5 INJECTION, SOLUTION INTRAVENOUS at 17:23

## 2018-03-04 RX ADMIN — METOPROLOL TARTRATE 12.5 MG: 25 TABLET ORAL at 15:41

## 2018-03-04 RX ADMIN — APIXABAN 5 MG: 5 TABLET, FILM COATED ORAL at 20:57

## 2018-03-04 RX ADMIN — CEFTRIAXONE SODIUM 1 G: 1 INJECTION, POWDER, FOR SOLUTION INTRAMUSCULAR; INTRAVENOUS at 16:52

## 2018-03-04 RX ADMIN — DILTIAZEM HYDROCHLORIDE 20 MG: 5 INJECTION INTRAVENOUS at 16:53

## 2018-03-04 RX ADMIN — CALCIUM GLUCONATE 1 G: 94 INJECTION, SOLUTION INTRAVENOUS at 17:32

## 2018-03-04 RX ADMIN — Medication 10 ML: at 21:56

## 2018-03-04 RX ADMIN — MAGNESIUM SULFATE HEPTAHYDRATE 1 G: 1 INJECTION, SOLUTION INTRAVENOUS at 16:52

## 2018-03-04 RX ADMIN — SODIUM CHLORIDE 1000 ML: 900 INJECTION, SOLUTION INTRAVENOUS at 15:37

## 2018-03-04 RX ADMIN — DILTIAZEM HYDROCHLORIDE 15 MG: 5 INJECTION INTRAVENOUS at 15:37

## 2018-03-04 NOTE — ED NOTES
Assumed care of patient. Patient placed in position of comfort. Call bell in reach. Skin warm and dry. Respirations even and unlabored. In no apparent distress at this time. Pt presents to the ED via EMS for c/o palpitations/a-fib x 2 pm today. Denies dizziness, CP, SOB, n/v. History of the same. Placed on cardiac monitor x 3.

## 2018-03-04 NOTE — ED PROVIDER NOTES
EMERGENCY DEPARTMENT HISTORY AND PHYSICAL EXAM      Date: 3/4/2018  Patient Name: Andrzej Gilbert    History of Presenting Illness     Chief Complaint   Patient presents with    Palpitations     The patient presents to the Emergency Department with complaints of palpitations, states that she has a history of afib. History Provided By: Patient    HPI: Andrzej Gilbert, 66 y.o. female with PMHx significant for anxiety, atrial fibrillation and PShx for tubal ligation, presents via EMS to the ED with cc of sudden onset, constant, palpitations with associated chest pressure that began ~2:00 PM today. Pt states that she was watching her grandchildren when her sxs onset. She describes the palpitations as a flutter. Pt had a similar episode on 2/14/2018 and was seen at [address] [facility] ED. She converted back to NSR after diltiazem drip. Pt was found to have a UTI at that visit, d/c with Cipro, and reports current urinary frequency. She reports that her current sxs are the first instance of palpitations since she began taking her Metoprolol Tartrate 12.5 mg BID correctly. Her a fib began 2 years ago at [facility]. She had another episode [place] of 2017, and notes that the episodes have been increasing in frequency since then. Stress testing and labs in 2/2018 were normal. She was placed on a 2 week holter monitor on 2/19/2018. Her other daily medications include 81 mg ASA, no blood thinners. Pt specifically denies SOB. PCP: None  Cardiology: Dr. Ciro Romero Hx: + (1 ppd) Tobacco, - EtOH, - Illicit Drugs    There are no other complaints, changes, or physical findings at this time.     Current Facility-Administered Medications   Medication Dose Route Frequency Provider Last Rate Last Dose    dilTIAZem (CARDIZEM) 125 mg in 0.9% sodium chloride 125 mL infusion  0-15 mg/hr IntraVENous TITRATE Sasha Patel, DO 15 mL/hr at 03/04/18 1751 15 mg/hr at 03/04/18 1751     Current Outpatient Prescriptions   Medication Sig Dispense Refill    metoprolol succinate (TOPROL-XL) 25 mg XL tablet Take 0.5 Tabs by mouth nightly. Stop Lopressor 45 Tab 3    aspirin delayed-release 81 mg tablet Take 1 Tab by mouth daily. Over the counter for atrial fibrillation 90 Tab 11     Past History     Past Medical History:  Past Medical History:   Diagnosis Date    Atrial fibrillation [facility])      Past Surgical History:  Past Surgical History:   Procedure Laterality Date    HX TUBAL LIGATION       Family History:  Family History   Problem Relation Age of Onset    Other Other      no known FH of CAD     Social History:  Social History   Substance Use Topics    Smoking status: Current Every Day Smoker     Packs/day: 1.00     Years: 35.00     Types: Cigarettes    Smokeless tobacco: Never Used    Alcohol use No     Allergies: Allergies   Allergen Reactions    Beef Containing Products Hives    Pork/Porcine Containing Products Hives    Macrobid [Nitrofurantoin Monohyd/M-Cryst] Hives    Pcn [Penicillins] Hives     Review of Systems   Review of Systems   Constitutional: Negative for chills and fever. HENT: Negative for congestion and sore throat. Eyes: Negative for visual disturbance. Respiratory: Negative for cough and shortness of breath. Cardiovascular: Positive for chest pain (pressure) and palpitations. Negative for leg swelling. Gastrointestinal: Negative for abdominal pain, blood in stool, diarrhea and nausea. Endocrine: Negative for polyuria. Genitourinary: Positive for frequency. Negative for dysuria, flank pain, vaginal bleeding and vaginal discharge. Musculoskeletal: Negative for myalgias. Skin: Negative for rash. Allergic/Immunologic: Negative for immunocompromised state. Neurological: Negative for weakness and headaches. Psychiatric/Behavioral: Negative for confusion. Physical Exam   Physical Exam   Constitutional: She is oriented to person, place, and time. She appears well-developed and well-nourished. HENT:   Head: Normocephalic and atraumatic. Moist mucous membranes   Eyes: Conjunctivae are normal. Pupils are equal, round, and reactive to light. Right eye exhibits no discharge. Left eye exhibits no discharge. Neck: Normal range of motion. Neck supple. No tracheal deviation present. Cardiovascular: Normal heart sounds. An irregularly irregular rhythm present. Tachycardia present. No murmur heard. Pulmonary/Chest: Effort normal and breath sounds normal. No respiratory distress. She has no wheezes. She has no rales. Abdominal: Soft. Bowel sounds are normal. There is no tenderness. There is no rebound and no guarding. Musculoskeletal: Normal range of motion. She exhibits no edema, tenderness or deformity. No lower extremity edema  No calf tenderness    Neurological: She is alert and oriented to person, place, and time. Skin: Skin is warm and dry. No rash noted. No erythema. Psychiatric: Her behavior is normal.   Nursing note and vitals reviewed. Diagnostic Study Results     Labs -     Recent Results (from the past 12 hour(s))   EKG, 12 LEAD, INITIAL    Collection Time: 03/04/18  2:47 PM   Result Value Ref Range    Ventricular Rate 152 BPM    Atrial Rate 156 BPM    P-R Interval 170 ms    QRS Duration 80 ms    Q-T Interval 304 ms    QTC Calculation (Bezet) 483 ms    Calculated P Axis 59 degrees    Calculated R Axis 15 degrees    Calculated T Axis -132 degrees    Diagnosis       Sinus tachycardia  Marked ST abnormality, possible inferior subendocardial injury  When compared with ECG of 14-FEB-2018 23:10,  Vent.  rate has increased BY  77 BPM  ST now depressed in Inferior leads  T wave inversion more evident in Lateral leads     CBC WITH AUTOMATED DIFF    Collection Time: 03/04/18  3:33 PM   Result Value Ref Range    WBC 6.5 3.6 - 11.0 K/uL    RBC 4.77 3.80 - 5.20 M/uL    HGB 14.0 11.5 - 16.0 g/dL    HCT 42.4 35.0 - 47.0 %    MCV 88.9 80.0 - 99.0 FL    MCH 29.4 26.0 - 34.0 PG    MCHC 33.0 30.0 - 36.5 g/dL    RDW 13.8 11.5 - 14.5 %    PLATELET 841 868 - 513 K/uL    MPV 10.0 8.9 - 12.9 FL    NRBC 0.0 0  WBC    ABSOLUTE NRBC 0.00 0.00 - 0.01 K/uL    NEUTROPHILS 68 32 - 75 %    LYMPHOCYTES 20 12 - 49 %    MONOCYTES 10 5 - 13 %    EOSINOPHILS 1 0 - 7 %    BASOPHILS 1 0 - 1 %    IMMATURE GRANULOCYTES 0 0.0 - 0.5 %    ABS. NEUTROPHILS 4.4 1.8 - 8.0 K/UL    ABS. LYMPHOCYTES 1.3 0.8 - 3.5 K/UL    ABS. MONOCYTES 0.6 0.0 - 1.0 K/UL    ABS. EOSINOPHILS 0.1 0.0 - 0.4 K/UL    ABS. BASOPHILS 0.0 0.0 - 0.1 K/UL    ABS. IMM. GRANS. 0.0 0.00 - 0.04 K/UL    DF AUTOMATED     METABOLIC PANEL, COMPREHENSIVE    Collection Time: 03/04/18  3:33 PM   Result Value Ref Range    Sodium 142 136 - 145 mmol/L    Potassium 3.6 3.5 - 5.1 mmol/L    Chloride 110 (H) 97 - 108 mmol/L    CO2 26 21 - 32 mmol/L    Anion gap 6 5 - 15 mmol/L    Glucose 124 (H) 65 - 100 mg/dL    BUN 12 6 - 20 MG/DL    Creatinine 0.79 0.55 - 1.02 MG/DL    BUN/Creatinine ratio 15 12 - 20      GFR est AA >60 >60 ml/min/1.73m2    GFR est non-AA >60 >60 ml/min/1.73m2    Calcium 9.2 8.5 - 10.1 MG/DL    Bilirubin, total 0.4 0.2 - 1.0 MG/DL    ALT (SGPT) 23 12 - 78 U/L    AST (SGOT) 13 (L) 15 - 37 U/L    Alk.  phosphatase 89 45 - 117 U/L    Protein, total 6.9 6.4 - 8.2 g/dL    Albumin 3.4 (L) 3.5 - 5.0 g/dL    Globulin 3.5 2.0 - 4.0 g/dL    A-G Ratio 1.0 (L) 1.1 - 2.2     URINALYSIS W/ REFLEX CULTURE    Collection Time: 03/04/18  3:33 PM   Result Value Ref Range    Color YELLOW/STRAW      Appearance CLEAR CLEAR      Specific gravity 1.007 1.003 - 1.030      pH (UA) 6.5 5.0 - 8.0      Protein NEGATIVE  NEG mg/dL    Glucose NEGATIVE  NEG mg/dL    Ketone NEGATIVE  NEG mg/dL    Bilirubin NEGATIVE  NEG      Blood MODERATE (A) NEG      Urobilinogen 0.2 0.2 - 1.0 EU/dL    Nitrites NEGATIVE  NEG      Leukocyte Esterase LARGE (A) NEG      WBC  0 - 4 /hpf    RBC 5-10 0 - 5 /hpf    Epithelial cells FEW FEW /lpf    Bacteria 1+ (A) NEG /hpf    UA:UC IF INDICATED URINE CULTURE ORDERED (A) CNI      Hyaline cast 0-2 0 - 5 /lpf   NT-PRO BNP    Collection Time: 03/04/18  3:33 PM   Result Value Ref Range    NT pro- (H) 0 - 125 PG/ML     Radiologic Studies -     CXR Results  (Last 48 hours)               03/04/18 1552  XR CHEST PORT Final result    Impression:  IMPRESSION:       Mild cardiomegaly. Clear lungs. No change. Narrative:  EXAM:  XR CHEST PORT       INDICATION:  Cardiac palpitations today, chronic atrial fibrillation. COMPARISON: Portable chest on 11/27/2015       TECHNIQUE: Semiupright portable chest AP digital view       FINDINGS: Cardiac monitoring wires overlie the thorax. Cardiomegaly is mild and   accentuated by technique. Aortic contours are not enlarged. The pulmonary   vasculature is within normal limits. The lungs and pleural spaces are clear. The visualized bones and upper abdomen   are age-appropriate. Medical Decision Making   I am the first provider for this patient. I reviewed the vital signs, available nursing notes, past medical history, past surgical history, family history and social history. Vital Signs-Reviewed the patient's vital signs. Patient Vitals for the past 12 hrs:   Temp Pulse Resp BP SpO2   03/04/18 1920 - 91 - 115/75 96 %   03/04/18 1840 - 90 - 124/70 97 %   03/04/18 1800 - 95 - 123/66 94 %   03/04/18 1703 - (!) 102 - 124/66 98 %   03/04/18 1702 - 96 - 124/66 97 %   03/04/18 1700 - (!) 115 - 121/87 97 %   03/04/18 1600 - (!) 140 - 105/69 95 %   03/04/18 1500 - (!) 158 19 119/76 97 %   03/04/18 1444 98.3 °F (36.8 °C) (!) 159 14 126/61 97 %     Pulse Oximetry Analysis - 97% on RA    Cardiac Monitor:   Rate: 159 bpm  Rhythm: Atrial Fibrillation      EKG interpretation: (Preliminary) 14:47  Rhythm: atrial fib; and RVR. Rate (approx.): 152; Axis: normal; TN interval: normal; QRS interval: 80 ms; QT/QTc: 304/485 ms; ST/T wave: lateral ST changes, likely rate related.   Written by Michael Delaney, ED Ernesto, as dictated by Ayan Hu DO. Records Reviewed: Nursing Notes and Old Medical Records    Provider Notes (Medical Decision Making):   Pt with paroxsymal a fib, presenting today in RVR. No obvious inciting incident. Will treat with calcium channel blockers, PO beta blockers. ED Course:   Initial assessment performed. The patients presenting problems have been discussed, and they are in agreement with the care plan formulated and outlined with them. I have encouraged them to ask questions as they arise throughout their visit. CONSULT NOTE:  4:35 PM  Ayan Hu DO spoke with Dr. Dietra Goldberg,  Specialty: Cardiology   Discussed pt's hx, disposition, and available diagnostic and imaging results. Reviewed care plans. Consultant recommends agrees with plan of care, including dilt drip. If she does not convert back to NSR, he requests a call back so he can admit the pt.     5:06 PM  Monitor read: pt still in a fib, rate more controlled at 98, blood pressure at 124/66.    6:05 PM   Pt's rate has improved, she is on 15 mg dilt per hour. 6:25 PM  Updated Dr. Dietra Goldberg on pt's condition. He will admit the pt. CRITICAL CARE NOTE :    4:20 PM    IMPENDING DETERIORATION -Cardiovascular    ASSOCIATED RISK FACTORS - Dysrhythmia, Metabolic changes and Dehydration    MANAGEMENT- Bedside Assessment    INTERPRETATION -  Xrays, ekg and Blood Pressure    INTERVENTIONS - hemodynamic mngmt and Metobolic interventions    CASE REVIEW - Medical Sub-Specialist and Nursing    TREATMENT RESPONSE -Improved    PERFORMED BY - Self    NOTES   :    I have spent 55 minutes of critical care time involved in lab review, consultations with specialist, family decision- making, bedside attention and documentation. During this entire length of time I was immediately available to the patient . Ayan Hu DO    Disposition:  Admit Note:  6:25 PM  Patient is being admitted to the hospital by Dr. Dietra Goldberg.   The results of their tests and reasons for their admission have been discussed with them and/or available family. They convey agreement and understanding for the need to be admitted and for their admission diagnosis. Consultation has been made with the inpatient physician specialist for hospitalization. PLAN: Admit to Hospital     Diagnosis     Clinical Impression:   1. Atrial fibrillation, unspecified type ([address]    2. Urinary tract infection without hematuria, site unspecified      Attestations:    Attestation: This note is prepared by Wes Parks, acting as scribe for DO Enriqueta Ramos DO: The scribe's documentation has been prepared under my direction and personally reviewed by me in its entirety. I confirm that the note above accurately reflects all work, treatment, procedures, and medical decision making performed by me.

## 2018-03-04 NOTE — IP AVS SNAPSHOT
[address] [facility] 
[phone] Patient: Kathia Jaramillo MRN: [id number] PJR:[id number] About your hospitalization You were admitted on:  March 4, 2018 You last received care in the:  [facility] 2 INTRVNTNL CARDIO You were discharged on:  March 6, 2018 Why you were hospitalized Your primary diagnosis was:  Paf (Paroxysmal Atrial Fibrillation) (Hcc) Your diagnoses also included:  Lower Urinary Tract Infectious Disease, Atrial Fibrillation (Hcc) Follow-up Information Follow up With Details Comments Contact Info None   None ([phone]) Patient stated that they have no PCP Alejandra Ashby MD On 3/6/2018  [address] [facility] 
[phone] On 3/23/2018 at 11:30AM   
  
Your Scheduled Appointments Friday March 23, 2018 11:30 AM EDT  
ESTABLISHED PATIENT with Alejandra Ashby MD  
[facility]) [address] [facility]  
[phone] Discharge Orders None A check monique indicates which time of day the medication should be taken. My Medications START taking these medications Instructions Each Dose to Equal  
 Morning Noon Evening Bedtime  
 apixaban 5 mg tablet Commonly known as:  Kiara Bussing Your last dose was: Your next dose is: Take 1 Tab by mouth two (2) times a day. 5 mg  
    
   
   
   
  
 propafenone  mg SR capsule Commonly known as:  RYTHMOL SR Your last dose was: Your next dose is: Take 1 Cap by mouth every twelve (12) hours. 225 mg CONTINUE taking these medications Instructions Each Dose to Equal  
 Morning Noon Evening Bedtime  
 metoprolol succinate 25 mg XL tablet Commonly known as:  TOPROL-XL Your last dose was: Your next dose is: Take 0.5 Tabs by mouth nightly. Stop Lopressor 12.5 mg  
    
   
   
   
  
  
ASK your doctor about these medications Instructions Each Dose to Equal  
 Morning Noon Evening Bedtime  
 aspirin delayed-release 81 mg tablet Your last dose was: Your next dose is: Take 1 Tab by mouth daily. Over the counter for atrial fibrillation 81 mg Where to Get Your Medications These medications were sent to [address], [address] [place] [address] Phone:  [phone]  
  apixaban 5 mg tablet  
 propafenone  mg SR capsule Discharge Instructions [address] [phone] Patient ID: 
Roberto Carlos [facility] [id number] 
89 y.o. 
1960 Admit Date: 3/4/2018 Discharge Date: 3/6/2018 Admitting Physician: Juan José Starks MD  
 
Discharge Physician: MD Layla Oh NP Admission Diagnoses: Atrial fibrillation ([address] Atrial fibrillation ([address] Discharge Diagnoses: Principal Problem: 
  PAF (paroxysmal atrial fibrillation) ([address] (11/28/2015) Active Problems: 
  Lower urinary tract infectious disease (11/28/2015) Atrial fibrillation ([address] (3/4/2018) Discharge Condition: Good Cardiology Procedures this Admission:  EchoCardiogram 
 
Disposition: home Patient Instructions:  
 
Reference discharge instructions provided by nursing for diet and activity. Signed: 
Layla Vera NP 
3/6/2018 10:39 AM 
 
 
  
Atrial Fibrillation: Care Instructions Your Care Instructions Atrial fibrillation is an irregular and often fast heartbeat. Treating this condition is important for several reasons. It can cause blood clots, which can travel from your heart to your brain and cause a stroke.  If you have a fast heartbeat, you may feel lightheaded, dizzy, and weak. An irregular heartbeat can also increase your risk for heart failure. Atrial fibrillation is often the result of another heart condition, such as high blood pressure or coronary artery disease. Making changes to improve your heart condition will help you stay healthy and active. Follow-up care is a key part of your treatment and safety. Be sure to make and go to all appointments, and call your doctor if you are having problems. It's also a good idea to know your test results and keep a list of the medicines you take. How can you care for yourself at home? Medicines ? · Take your medicines exactly as prescribed. Call your doctor if you think you are having a problem with your medicine. You will get more details on the specific medicines your doctor prescribes. ? · If your doctor has given you a blood thinner to prevent a stroke, be sure you get instructions about how to take your medicine safely. Blood thinners can cause serious bleeding problems. ? · Do not take any vitamins, over-the-counter drugs, or herbal products without talking to your doctor first. ? Lifestyle changes ? · Do not smoke. Smoking can increase your chance of a stroke and heart attack. If you need help quitting, talk to your doctor about stop-smoking programs and medicines. These can increase your chances of quitting for good. ? · Eat a heart-healthy diet. ? · Stay at a healthy weight. Lose weight if you need to.  
? · Limit alcohol to 2 drinks a day for men and 1 drink a day for women. Too much alcohol can cause health problems. ? · Avoid colds and flu. Get a pneumococcal vaccine shot. If you have had one before, ask your doctor whether you need another dose. Get a flu shot every year. If you must be around people with colds or flu, wash your hands often. Activity ? · If your doctor recommends it, get more exercise.  Walking is a good choice. Bit by bit, increase the amount you walk every day. Try for at least 30 minutes on most days of the week. You also may want to swim, bike, or do other activities. Your doctor may suggest that you join a cardiac rehabilitation program so that you can have help increasing your physical activity safely. ? · Start light exercise if your doctor says it is okay. Even a small amount will help you get stronger, have more energy, and manage stress. Walking is an easy way to get exercise. Start out by walking a little more than you did in the hospital. Gradually increase the amount you walk. ? · When you exercise, watch for signs that your heart is working too hard. You are pushing too hard if you cannot talk while you are exercising. If you become short of breath or dizzy or have chest pain, sit down and rest immediately. ? · Check your pulse regularly. Place two fingers on the artery at the palm side of your wrist, in line with your thumb. If your heartbeat seems uneven or fast, talk to your doctor. When should you call for help? Call 911 anytime you think you may need emergency care. For example, call if: 
? · You have symptoms of a heart attack. These may include: ¨ Chest pain or pressure, or a strange feeling in the chest. 
¨ Sweating. ¨ Shortness of breath. ¨ Nausea or vomiting. ¨ Pain, pressure, or a strange feeling in the back, neck, jaw, or upper belly or in one or both shoulders or arms. ¨ Lightheadedness or sudden weakness. ¨ A fast or irregular heartbeat. After you call 911, the  may tell you to chew 1 adult-strength or 2 to 4 low-dose aspirin. Wait for an ambulance. Do not try to drive yourself. ? · You have symptoms of a stroke. These may include: 
¨ Sudden numbness, tingling, weakness, or loss of movement in your face, arm, or leg, especially on only one side of your body. ¨ Sudden vision changes. ¨ Sudden trouble speaking. ¨ Sudden confusion or trouble understanding simple statements. ¨ Sudden problems with walking or balance. ¨ A sudden, severe headache that is different from past headaches. ? · You passed out (lost consciousness). ?Call your doctor now or seek immediate medical care if: 
? · You have new or increased shortness of breath. ? · You feel dizzy or lightheaded, or you feel like you may faint. ? · Your heart rate becomes irregular. ? · You can feel your heart flutter in your chest or skip heartbeats. Tell your doctor if these symptoms are new or worse. ? Watch closely for changes in your health, and be sure to contact your doctor if you have any problems. Where can you learn more? Go to http://trevor-darrick.info/. Enter U020 in the search box to learn more about \"Atrial Fibrillation: Care Instructions. \" Current as of: September 21, 2016 Content Version: 11.4 © 8009-5969 [employer]. Care instructions adapted under license by [employer] (which disclaims liability or warranty for this information). If you have questions about a medical condition or this instruction, always ask your healthcare professional. [place] [address] any warranty or liability for your use of this information. Punchbowl Announcement We are excited to announce that we are making your provider's discharge notes available to you in Punchbowl. You will see these notes when they are completed and signed by the physician that discharged you from your recent hospital stay. If you have any questions or concerns about any information you see in Punchbowl, please call the Health Information Department where you were seen or reach out to your Primary Care Provider for more information about your plan of care. Introducing [facility]!    
 Brit Silverio introduces Punchbowl patient portal. Now you can access parts of your medical record, email your doctor's office, and request medication refills online. 1. In your internet browser, go to [email or web address] 2. Click on the First Time User? Click Here link in the Sign In box. You will see the New Member Sign Up page. 3. Enter your [employer] Access Code exactly as it appears below. You will not need to use this code after youve completed the sign-up process. If you do not sign up before the expiration date, you must request a new code. · [employer] Access Code: [email or web address] Expires: 3/26/2018  5:33 AM 
 
4. Enter the last four digits of your Social Security Number (xxxx) and Date of Birth (mm/dd/yyyy) as indicated and click Submit. You will be taken to the next sign-up page. 5. Create a [employer] ID. This will be your [employer] login ID and cannot be changed, so think of one that is secure and easy to remember. 6. Create a [employer] password. You can change your password at any time. 7. Enter your Password Reset Question and Answer. This can be used at a later time if you forget your password. 8. Enter your e-mail address. You will receive e-mail notification when new information is available in [address] 9. Click Sign Up. You can now view and download portions of your medical record. 10. Click the Download Summary menu link to download a portable copy of your medical information. If you have questions, please visit the Frequently Asked Questions section of the [employer] website. Remember, [employer] is NOT to be used for urgent needs. For medical emergencies, dial 911. Now available from your iPhone and Android! Unresulted Labs-Please follow up with your PCP about these lab tests Order Current Status CULTURE, URINE Preliminary result Providers Seen During Your Hospitalization Provider Specialty Primary office phone Enriqueta Newell DO Emergency Medicine [phone] Mehrdad Zhang MD Cardiology [phone] Your Primary Care Physician (PCP) Primary Care Physician Office Phone Office Fax NONE ** None ** ** None ** You are allergic to the following Allergen Reactions Beef Containing Products Hives Pork/Porcine Containing Products Hives Macrobid (Nitrofurantoin Monohyd/M-Cryst) Hives Pcn (Penicillins) Hives Recent Documentation Height Weight BMI OB Status Smoking Status 1.676 m 110.2 kg 39.22 kg/m2 Postmenopausal Current Every Day Smoker Emergency Contacts Name Discharge Info Relation Home Work Mobile Juan Azar N/A  AT THIS TIME [6] Parent [1] [phone] MarkEduardo  Child [2] [phone] Kerwin Flores DISCHARGE CAREGIVER [3] Son [22] [phone] Patient Belongings The following personal items are in your possession at time of discharge: 
  Dental Appliances: None  Visual Aid: Glasses      Home Medications: Kept at bedside   Jewelry: Ring, Necklace  Clothing: At bedside    Other Valuables: Cell Phone, At bedside  Personal Items Sent to Safe: none Please provide this summary of care documentation to your next provider. Signatures-by signing, you are acknowledging that this After Visit Summary has been reviewed with you and you have received a copy. Patient Signature:  ____________________________________________________________ Date:  ____________________________________________________________  
  
Carolina Sleeper Provider Signature:  ____________________________________________________________ Date:  ____________________________________________________________

## 2018-03-04 NOTE — ED NOTES
Assumed care of patient. Patient placed in position of comfort. Call bell in reach. Skin warm and dry. Respirations even and unlabored. In no apparent distress at this time. Pt presents to the ED with c/o palpitations/a-fib x 3 pm today-history of the same.  Denies dizziness, SOB, CP.

## 2018-03-04 NOTE — ED NOTES
Pt ringing out on call bell. Reporting need to urinate. Bedside commode emptied of about 450mL urine. Assisted up to bedside commode with steady gait. Call bell left within reach and advised to ring out for help if needed. No other complaints voiced at this time.

## 2018-03-05 ENCOUNTER — APPOINTMENT (OUTPATIENT)
Dept: NUCLEAR MEDICINE | Age: 58
DRG: 309 | End: 2018-03-05
Attending: NURSE PRACTITIONER
Payer: COMMERCIAL

## 2018-03-05 LAB
ANION GAP SERPL CALC-SCNC: 7 MMOL/L (ref 5–15)
ATRIAL RATE: 156 BPM
ATRIAL RATE: 65 BPM
ATTENDING PHYSICIAN, CST07: NORMAL
BUN SERPL-MCNC: 9 MG/DL (ref 6–20)
BUN/CREAT SERPL: 11 (ref 12–20)
CALCIUM SERPL-MCNC: 8.3 MG/DL (ref 8.5–10.1)
CALCULATED P AXIS, ECG09: 55 DEGREES
CALCULATED P AXIS, ECG09: 59 DEGREES
CALCULATED R AXIS, ECG10: -3 DEGREES
CALCULATED R AXIS, ECG10: 15 DEGREES
CALCULATED T AXIS, ECG11: -132 DEGREES
CALCULATED T AXIS, ECG11: 7 DEGREES
CHLORIDE SERPL-SCNC: 107 MMOL/L (ref 97–108)
CO2 SERPL-SCNC: 27 MMOL/L (ref 21–32)
CREAT SERPL-MCNC: 0.8 MG/DL (ref 0.55–1.02)
DIAGNOSIS, 93000: NORMAL
DUKE TM SCORE RESULT, CST14: NORMAL
DUKE TREADMILL SCORE, CST13: 5456
ECG INTERP BEFORE EX, CST11: NORMAL
ECG INTERP DURING EX, CST12: NORMAL
ERYTHROCYTE [DISTWIDTH] IN BLOOD BY AUTOMATED COUNT: 13.9 % (ref 11.5–14.5)
FUNCTIONAL CAPACITY, CST17: NORMAL
GLUCOSE SERPL-MCNC: 107 MG/DL (ref 65–100)
HCT VFR BLD AUTO: 41 % (ref 35–47)
HGB BLD-MCNC: 13.5 G/DL (ref 11.5–16)
KNOWN CARDIAC CONDITION, CST08: NORMAL
MAGNESIUM SERPL-MCNC: 2 MG/DL (ref 1.6–2.4)
MAX. DIASTOLIC BP, CST04: 57 MMHG
MAX. HEART RATE, CST05: 102 BPM
MAX. SYSTOLIC BP, CST03: 134 MMHG
MCH RBC QN AUTO: 29.1 PG (ref 26–34)
MCHC RBC AUTO-ENTMCNC: 32.9 G/DL (ref 30–36.5)
MCV RBC AUTO: 88.4 FL (ref 80–99)
NRBC # BLD: 0 K/UL (ref 0–0.01)
NRBC BLD-RTO: 0 PER 100 WBC
OVERALL BP RESPONSE TO EXERCISE, CST16: NORMAL
OVERALL HR RESPONSE TO EXERCISE, CST15: NORMAL
P-R INTERVAL, ECG05: 152 MS
P-R INTERVAL, ECG05: 170 MS
PEAK EX METS, CST10: 1 METS
PLATELET # BLD AUTO: 234 K/UL (ref 150–400)
PMV BLD AUTO: 10 FL (ref 8.9–12.9)
POTASSIUM SERPL-SCNC: 3.6 MMOL/L (ref 3.5–5.1)
PROTOCOL NAME, CST01: NORMAL
Q-T INTERVAL, ECG07: 304 MS
Q-T INTERVAL, ECG07: 410 MS
QRS DURATION, ECG06: 80 MS
QRS DURATION, ECG06: 90 MS
QTC CALCULATION (BEZET), ECG08: 426 MS
QTC CALCULATION (BEZET), ECG08: 483 MS
RBC # BLD AUTO: 4.64 M/UL (ref 3.8–5.2)
SODIUM SERPL-SCNC: 141 MMOL/L (ref 136–145)
TEST INDICATION, CST09: NORMAL
VENTRICULAR RATE, ECG03: 152 BPM
VENTRICULAR RATE, ECG03: 65 BPM
WBC # BLD AUTO: 7.6 K/UL (ref 3.6–11)

## 2018-03-05 PROCEDURE — 99218 HC RM OBSERVATION: CPT

## 2018-03-05 PROCEDURE — 74011000250 HC RX REV CODE- 250: Performed by: EMERGENCY MEDICINE

## 2018-03-05 PROCEDURE — 74011250636 HC RX REV CODE- 250/636

## 2018-03-05 PROCEDURE — 78452 HT MUSCLE IMAGE SPECT MULT: CPT

## 2018-03-05 PROCEDURE — 74011250637 HC RX REV CODE- 250/637: Performed by: NURSE PRACTITIONER

## 2018-03-05 PROCEDURE — 93017 CV STRESS TEST TRACING ONLY: CPT

## 2018-03-05 PROCEDURE — 85027 COMPLETE CBC AUTOMATED: CPT | Performed by: EMERGENCY MEDICINE

## 2018-03-05 PROCEDURE — 36415 COLL VENOUS BLD VENIPUNCTURE: CPT | Performed by: EMERGENCY MEDICINE

## 2018-03-05 PROCEDURE — 74011250637 HC RX REV CODE- 250/637: Performed by: INTERNAL MEDICINE

## 2018-03-05 PROCEDURE — 77030027138 HC INCENT SPIROMETER -A

## 2018-03-05 PROCEDURE — 93041 RHYTHM ECG TRACING: CPT

## 2018-03-05 PROCEDURE — 65660000000 HC RM CCU STEPDOWN

## 2018-03-05 PROCEDURE — 93306 TTE W/DOPPLER COMPLETE: CPT

## 2018-03-05 PROCEDURE — 83735 ASSAY OF MAGNESIUM: CPT | Performed by: INTERNAL MEDICINE

## 2018-03-05 PROCEDURE — 80048 BASIC METABOLIC PNL TOTAL CA: CPT | Performed by: INTERNAL MEDICINE

## 2018-03-05 PROCEDURE — 74011000258 HC RX REV CODE- 258: Performed by: EMERGENCY MEDICINE

## 2018-03-05 RX ORDER — SODIUM CHLORIDE 0.9 % (FLUSH) 0.9 %
SYRINGE (ML) INJECTION
Status: COMPLETED
Start: 2018-03-05 | End: 2018-03-05

## 2018-03-05 RX ORDER — METOPROLOL SUCCINATE 25 MG/1
25 TABLET, EXTENDED RELEASE ORAL DAILY
Status: DISCONTINUED | OUTPATIENT
Start: 2018-03-06 | End: 2018-03-06 | Stop reason: HOSPADM

## 2018-03-05 RX ORDER — PROPAFENONE HYDROCHLORIDE 225 MG/1
225 CAPSULE, EXTENDED RELEASE ORAL EVERY 12 HOURS
Status: DISCONTINUED | OUTPATIENT
Start: 2018-03-05 | End: 2018-03-06 | Stop reason: HOSPADM

## 2018-03-05 RX ORDER — ACETAMINOPHEN 325 MG/1
650 TABLET ORAL
Status: DISCONTINUED | OUTPATIENT
Start: 2018-03-05 | End: 2018-03-06 | Stop reason: HOSPADM

## 2018-03-05 RX ADMIN — Medication 10 ML: at 01:54

## 2018-03-05 RX ADMIN — DILTIAZEM HYDROCHLORIDE 5 MG/HR: 5 INJECTION, SOLUTION INTRAVENOUS at 11:45

## 2018-03-05 RX ADMIN — APIXABAN 5 MG: 5 TABLET, FILM COATED ORAL at 11:46

## 2018-03-05 RX ADMIN — Medication 10 ML: at 12:08

## 2018-03-05 RX ADMIN — DILTIAZEM HYDROCHLORIDE 15 MG/HR: 5 INJECTION, SOLUTION INTRAVENOUS at 01:53

## 2018-03-05 RX ADMIN — APIXABAN 5 MG: 5 TABLET, FILM COATED ORAL at 20:25

## 2018-03-05 RX ADMIN — REGADENOSON 0.4 MG: 0.08 INJECTION, SOLUTION INTRAVENOUS at 12:08

## 2018-03-05 RX ADMIN — Medication 10 ML: at 20:29

## 2018-03-05 RX ADMIN — ACETAMINOPHEN 650 MG: 325 TABLET ORAL at 20:26

## 2018-03-05 RX ADMIN — PROPAFENONE HYDROCHLORIDE 225 MG: 225 CAPSULE, EXTENDED RELEASE ORAL at 16:38

## 2018-03-05 NOTE — ED NOTES
Bedside and Verbal shift change report given to Abigail Bernal RN (oncoming nurse) by this RN (offgoing nurse). Report included the following information SBAR.

## 2018-03-05 NOTE — ED NOTES
Dr. Khadra Andre aware that patient is experiencing episodes of A. Flutter. As long as patient's HR remains <100, no changes to medication to be made.

## 2018-03-05 NOTE — PHYSICIAN ADVISORY
Letter of Status Determination:   Recommend hospitalization status upgraded from   OBSERVATION  to INPATIENT  Status     Pt Name:  Marquis Smith   MR#   [address] [facility] # [id number] /  [id number]   [facility]#  [id number]   [address] [place] [address]  2156/01  @ [facility]   Hospitalization date  3/4/2018  2:42 PM   Current Attending Physician  Edda Hubbard*   Principal diagnosis  PAF (paroxysmal atrial fibrillation) [facility])      Clinicals  66 y.o. y.o  female hospitalized with above diagnosis   The pt is noted to have had recurrent Afib with RVR   She was started on cardizem drip with conversion to NSR. She is also noted to have UTI with urine culture growing >100K colonies of GNR. Milliman (MCG) criteria   Does   apply Atrial Fibrillation  ORG: M-505 (ISC)    Hemodynamicinstability    STATUS DETERMINATION  Based on documented presenting clinical data, comorbid conditions, high risk of adverse events and deterioration, it is our recommendation that the patient's status should be upgraded from OBSERVATION to INPATIENT status. The final decision of the patient's hospitalization status depends on the attending physician's judgment. Additional comments     Payor: Brian Balderrama / Plan: Lonza Edwards PPO / Product Type: PPO /         Desiderio Crigler MD MPH FACP     Physician Advisor    [address]   [facility], [address] [place] [address]    Cell  [phone]        [phone]    .

## 2018-03-05 NOTE — PROGRESS NOTES
TRANSFER - IN REPORT:    Verbal report received from [facility]) on Jorge Peers  being received from ER(unit) for routine progression of care      Report consisted of patients Situation, Background, Assessment and   Recommendations(SBAR). Information from the following report(s) SBAR, Kardex, ED Summary, Intake/Output, MAR, Recent Results, Med Rec Status and Cardiac Rhythm afib controlled was reviewed with the receiving nurse. Opportunity for questions and clarification was provided. Assessment will be completed upon patients arrival to unit and care assumed.

## 2018-03-05 NOTE — PROGRESS NOTES
2150 - adm to room 2156. Afib controlled rate mostly in  range, occ up to 110. No pain, No SOB. Cardizem qtt at 15mg/hr. BP wnl. Negative assessment otherwise. 0000 - NPO until further orders in am.    0205 - Up to restroom and converted from controlled Afib to NSR 70's. VSS, no complaints. Bedside report to RN.

## 2018-03-05 NOTE — H&P
[address] [address]  [phone]          CARDIOLOGY HISTORY AND PHYSICAL    Date of  Admission: 3/4/2018  2:42 PM     Admission type:Emergency     Subjective:      Marquis Rodriguez is a 66 y.o. female admitted for Atrial fibrillation ([facility] Previous ED admission 12/2017 for afib, converted to SR. Followed by Dr. Maged Granda 2/8/18, Holter placed and stress study attempted. Admitted with recurrent afib with RVR, placed on Dilt IV for rate control. Converted to SR this AM.  Denies SOB, CP, dizziness/lightheadedness. Has sleep apnea, but has never f/u with sleep study.      Cardiac risk factors: smoking/ tobacco exposure, obesity, sedentary life style, hypertension, stress, post-menopausal.     \  Patient Active Problem List    Diagnosis Date Noted    Atrial fibrillation ([facility] 03/04/2018    Irregular heart beat 12/07/2015    PAF (paroxysmal atrial fibrillation) ([facility] 11/28/2015    Lower urinary tract infectious disease 11/28/2015    Allergic drug rash due to sulfonamide 11/28/2015      None  Past Medical History:   Diagnosis Date    Atrial fibrillation [facility])       Social History     Social History    Marital status:      Spouse name: N/A    Number of children: N/A    Years of education: N/A     Social History Main Topics    Smoking status: Current Every Day Smoker     Packs/day: 1.00     Years: 35.00     Types: Cigarettes    Smokeless tobacco: Never Used    Alcohol use No    Drug use: No    Sexual activity: Not Asked     Other Topics Concern    None     Social History Narrative     Allergies   Allergen Reactions    Beef Containing Products Hives    Pork/Porcine Containing Products Hives    Macrobid [Nitrofurantoin Monohyd/M-Cryst] Hives    Pcn [Penicillins] Hives      Family History   Problem Relation Age of Onset    Other Other      no known FH of CAD      Current Facility-Administered Medications   Medication Dose Route Frequency    dilTIAZem (CARDIZEM) 125 mg in 0.9% sodium chloride 125 mL infusion  0-15 mg/hr IntraVENous TITRATE    sodium chloride (NS) flush 5-10 mL  5-10 mL IntraVENous Q8H    sodium chloride (NS) flush 5-10 mL  5-10 mL IntraVENous PRN    apixaban (ELIQUIS) tablet 5 mg  5 mg Oral BID         Review of Symptoms:  Constitutional: negative  Eyes: negative  Ears, nose, mouth, throat, and face: negative  Respiratory: negative  Cardiovascular: palpitations  Gastrointestinal: negative  Genitourinary:recent UTI treated with antibx, continues with frequency  Musculoskeletal:negative  Neurological: negative  Behvioral/Psych: negative  Endocrine: negative     Objective:   Physical Exam:  General Appearance:  morbidly obese  female in no acute distress  Chest:   Clear  Cardiovascular:  irr, irr no murmur.   Abdomen:   Soft, non-tender, bowel sounds are active.   Extremities: no peripheral edema  Skin:  Warm and dry.     Visit Vitals    BP 96/51 (BP 1 Location: Right arm, BP Patient Position: At rest)    Pulse 70    Temp 97.8 °F (36.6 °C)    Resp 18    Ht 5' 6\" (1.676 m)    Wt 110.2 kg (243 lb)    SpO2 96%    BMI 39.22 kg/m2       Cardiographics    Telemetry: SR  ECG: SR with no acute changes, nonspecific T wave changes ant/lat  Echocardiogram: pending      Labs:  Recent Labs      03/05/18   0156  03/04/18   1533   WBC  7.6  6.5   HGB  13.5  14.0   HCT  41.0  42.4   PLT  234  248     Recent Labs      03/05/18   0156  03/04/18   1533   NA  141  142   K  3.6  3.6   CL  107  110*   CO2  27  26   GLU  107*  124*   BUN  9  12   CREA  0.80  0.79   CA  8.3*  9.2   MG  2.0   --    ALB   --   3.4*   TBILI   --   0.4   SGOT   --   13*   ALT   --   23       No results for input(s): TROIQ, CPK, CKMB in the last 72 hours.        Assessment:       Active Problems:    PAF (paroxysmal atrial fibrillation) ([facility] (11/28/2015)      Lower urinary tract infectious disease (11/28/2015)      Atrial fibrillation ([facility] (3/4/2018)         Plan:     PAF:  Converted to SR overnight on IV Dilt  Will need antiarrhythmic to keep patient in SR - rhythmol vs sotalol depending on the echo results  Chads2 Vasc score:  2, recurrent afib, started on Eliquis  Lexiscan nuclear study and echo today    MAYANK:  Recommend outpt f/u with sleep study    Tob abuse:  Discussed smoking cessation    UTI:  Continues with frequency and large # WBC in urine, micro pending      [facility]    3/5/2018         Patient seen, examined by me personally. Plan discussed as detailed. Agree with note as outlined by  NP. I confirm findings in history and physical exam. No additional findings noted. Agree with plan as outlined above.      Champ Naqvi MD

## 2018-03-05 NOTE — ED NOTES
Bedside and Verbal shift change received from Loraine Chung RN. Report included the following information: SBAR, ED Summary, MAR and Recent Results. Patient to bedside commode with steady gait. Cardizem infusing. No concerns at this time.

## 2018-03-05 NOTE — PROGRESS NOTES
Negative Lexiscan and echo WNL  Start PTA metoprolol XL 25mg in AM  Start Rhythmol 225mg BID, antiarrhythmic to hopefully keep Ms. Lis Moore in [employer]

## 2018-03-06 VITALS
TEMPERATURE: 98.8 F | DIASTOLIC BLOOD PRESSURE: 49 MMHG | RESPIRATION RATE: 16 BRPM | SYSTOLIC BLOOD PRESSURE: 110 MMHG | HEIGHT: 66 IN | OXYGEN SATURATION: 95 % | HEART RATE: 71 BPM | BODY MASS INDEX: 39.05 KG/M2 | WEIGHT: 243 LBS

## 2018-03-06 LAB
ANION GAP SERPL CALC-SCNC: 8 MMOL/L (ref 5–15)
BACTERIA SPEC CULT: ABNORMAL
BUN SERPL-MCNC: 12 MG/DL (ref 6–20)
BUN/CREAT SERPL: 17 (ref 12–20)
CALCIUM SERPL-MCNC: 8.2 MG/DL (ref 8.5–10.1)
CC UR VC: ABNORMAL
CHLORIDE SERPL-SCNC: 107 MMOL/L (ref 97–108)
CO2 SERPL-SCNC: 25 MMOL/L (ref 21–32)
CREAT SERPL-MCNC: 0.71 MG/DL (ref 0.55–1.02)
ERYTHROCYTE [DISTWIDTH] IN BLOOD BY AUTOMATED COUNT: 14 % (ref 11.5–14.5)
GLUCOSE SERPL-MCNC: 98 MG/DL (ref 65–100)
HCT VFR BLD AUTO: 41.3 % (ref 35–47)
HGB BLD-MCNC: 13.5 G/DL (ref 11.5–16)
MCH RBC QN AUTO: 29.4 PG (ref 26–34)
MCHC RBC AUTO-ENTMCNC: 32.7 G/DL (ref 30–36.5)
MCV RBC AUTO: 90 FL (ref 80–99)
NRBC # BLD: 0 K/UL (ref 0–0.01)
NRBC BLD-RTO: 0 PER 100 WBC
PLATELET # BLD AUTO: 217 K/UL (ref 150–400)
PMV BLD AUTO: 10 FL (ref 8.9–12.9)
POTASSIUM SERPL-SCNC: 3.6 MMOL/L (ref 3.5–5.1)
RBC # BLD AUTO: 4.59 M/UL (ref 3.8–5.2)
SERVICE CMNT-IMP: ABNORMAL
SODIUM SERPL-SCNC: 140 MMOL/L (ref 136–145)
WBC # BLD AUTO: 6.7 K/UL (ref 3.6–11)

## 2018-03-06 PROCEDURE — 36415 COLL VENOUS BLD VENIPUNCTURE: CPT | Performed by: EMERGENCY MEDICINE

## 2018-03-06 PROCEDURE — 80048 BASIC METABOLIC PNL TOTAL CA: CPT | Performed by: EMERGENCY MEDICINE

## 2018-03-06 PROCEDURE — 74011250637 HC RX REV CODE- 250/637: Performed by: INTERNAL MEDICINE

## 2018-03-06 PROCEDURE — 85027 COMPLETE CBC AUTOMATED: CPT | Performed by: EMERGENCY MEDICINE

## 2018-03-06 PROCEDURE — 74011250637 HC RX REV CODE- 250/637: Performed by: NURSE PRACTITIONER

## 2018-03-06 RX ORDER — NYSTATIN 100000 [USP'U]/G
POWDER TOPICAL 3 TIMES DAILY
Status: DISCONTINUED | OUTPATIENT
Start: 2018-03-06 | End: 2018-03-06 | Stop reason: HOSPADM

## 2018-03-06 RX ORDER — PROPAFENONE HYDROCHLORIDE 225 MG/1
225 CAPSULE, EXTENDED RELEASE ORAL EVERY 12 HOURS
Qty: 60 CAP | Refills: 11 | Status: SHIPPED | OUTPATIENT
Start: 2018-03-06 | End: 2019-01-17 | Stop reason: SDUPTHER

## 2018-03-06 RX ADMIN — METOPROLOL SUCCINATE 25 MG: 25 TABLET, EXTENDED RELEASE ORAL at 08:22

## 2018-03-06 RX ADMIN — APIXABAN 5 MG: 5 TABLET, FILM COATED ORAL at 08:22

## 2018-03-06 RX ADMIN — Medication 10 ML: at 08:22

## 2018-03-06 RX ADMIN — Medication 10 ML: at 04:39

## 2018-03-06 RX ADMIN — PROPAFENONE HYDROCHLORIDE 225 MG: 225 CAPSULE, EXTENDED RELEASE ORAL at 08:22

## 2018-03-06 RX ADMIN — NYSTATIN: 100000 POWDER TOPICAL at 07:26

## 2018-03-06 NOTE — PROGRESS NOTES
Paged Dr. Bridger Beltran for the patient's complaints of burning and redness between her abdominal folds.  Orders given

## 2018-03-06 NOTE — PROGRESS NOTES
Patient ambulated in hallway without difficulty. Dressing CDI. No complaints. Discharge instructions reviewed with patient; to be discharged to home with family. Site care instructions reviewed; site(s) CDI. Patient instructed on which medications to continue, which to start, and which to stop. Prescriptions given to patient. Medication info provided and reviewed with patient. Follow-up appointment information given; follow-up appointment to be made by patient. IV and tele box removed. Opportunity for questions provided; all questions answered. All belongings returned. Patient wheeled to front door via wheelchair by nurse; to be transported home by wife.

## 2018-03-06 NOTE — ROUTINE PROCESS
Bedside and Verbal shift change report [facility] (oncoming nurse) by Will RN (offgoing nurse). Report included the following information SBAR, Kardex, Intake/Output and MAR.

## 2018-03-06 NOTE — DISCHARGE SUMMARY
[address]  [phone]     Cardiology Discharge Summary     Patient ID:  Kristen Sinclair  [id number]  52 y.o.  1960    Admit Date: 3/4/2018    Discharge Date: 3/6/2018     Admitting Physician: Noreen Frank MD     Discharge Physician:  MD Erica Olivarez NP    Admission Diagnoses:   Atrial fibrillation [facility])  Atrial fibrillation [facility])    Discharge Diagnoses:   Principal Problem:    PAF (paroxysmal atrial fibrillation) ([facility] (11/28/2015)    Active Problems:    Lower urinary tract infectious disease (11/28/2015)      Atrial fibrillation ([facility] (3/4/2018)        Discharge Condition: Good    Cardiology Procedures this Admission:  Nuclear stress test    Patient Active Problem List    Diagnosis Date Noted    Atrial fibrillation ([place] [address].) 03/04/2018    Irregular heart beat 12/07/2015    PAF (paroxysmal atrial fibrillation) ([facility] 11/28/2015    Lower urinary tract infectious disease 11/28/2015    Allergic drug rash due to sulfonamide 11/28/2015      Past Medical History:   Diagnosis Date    Atrial fibrillation [facility])       Social History     Social History    Marital status:      Spouse name: N/A    Number of children: N/A    Years of education: N/A     Social History Main Topics    Smoking status: Current Every Day Smoker     Packs/day: 1.00     Years: 35.00     Types: Cigarettes    Smokeless tobacco: Never Used    Alcohol use No    Drug use: No    Sexual activity: Not Asked     Other Topics Concern    None     Social History Narrative     Allergies   Allergen Reactions    Beef Containing Products Hives    Pork/Porcine Containing Products Hives    Macrobid [Nitrofurantoin Monohyd/M-Cryst] Hives    Pcn [Penicillins] Hives      Family History   Problem Relation Age of Onset    Other Other      no known FH of CAD        Hospital Course: Kristen Sinclair is a 66 y.o. female admitted for Atrial fibrillation ([facility]  Previous ED admission 12/2017 for afib, converted to SR. Followed by Dr. Michael Leung 2/8/18, Holter placed and stress study attempted. Admitted with recurrent afib with RVR, placed on Dilt IV for rate control. Converted to SR this AM.  Denies SOB, CP, dizziness/lightheadedness. Has sleep apnea, but has never f/u with sleep study. Had Holter monitor on at the time of admission to further evaluate arrhythmias.      Cardiac risk factors: smoking/ tobacco exposure, obesity, sedentary life style, hypertension, stress, post-menopausal.     Paroxysmal Atrial Fib: patient remained on IV diltiazem, converted back to SR. Chads2 Vasc score: 2. Started on Eliquis as this is her second occurrence of PAF. EF normal, started on Rhythmol 225mg BID, and will continue on metoprolol 12.5mg XL. Ltanya Meals nuclear study negative for ischemia     MAYANK:  Recommend outpt f/u with sleep study     Tob abuse:  Discussed smoking cessation     UTI:  Treated with Cipro several weeks ago  Continues with frequency and large # WBC in urine, micro showing gram neg rods with > 100,000, no sensitivities yet. Patient received Rocephin 1 gram in ED  She is sensistive to PCN  Recommend that she f/u with PCP      Visit Vitals    /49 (BP 1 Location: Right arm, BP Patient Position: At rest)    Pulse 71    Temp 98.8 °F (37.1 °C)    Resp 16    Ht 5' 6\" (1.676 m)    Wt 110.2 kg (243 lb)    SpO2 95%    BMI 39.22 kg/m2       Physical Exam  General:  Obese  female in no acute distress  Abdomen: soft, non-tender.  Bowel sounds normal.   Extremities: extremities normal, no edema, pulses   Heart: regular rate and rhythm, no murmur, S3/JVD  Lungs: clear to auscultation bilaterally  Neck: supple, symmetrical, trachea midline,   Neurologic: Grossly normal    Labs:   Recent Labs      03/06/18   0441  03/05/18   0156  03/04/18   1533   WBC  6.7  7.6  6.5   HGB  13.5  13.5  14.0   HCT  41.3  41.0  42.4   PLT  217  234  248     Recent Labs      03/06/18 0441  03/05/18   0156  03/04/18   1533   NA  140  141  142   K  3.6  3.6  3.6   CL  107  107  110*   CO2  25  27  26   GLU  98  107*  124*   BUN  12  9  12   CREA  0.71  0.80  0.79   CA  8.2*  8.3*  9.2   MG   --   2.0   --    ALB   --    --   3.4*   TBILI   --    --   0.4   SGOT   --    --   13*   ALT   --    --   23       No results for input(s): TROIQ, CPK, CKMB in the last 72 hours. EKG: SR    Cxray: no acute changes    Echo:  Left ventricle: Systolic function was normal. Ejection fraction was  estimated in the range of 55 % to 60 %. There were no regional wall motion  abnormalities. There was mild asymmetric hypertrophy of the anterior wall. Mitral valve: There was mild regurgitation. Disposition: home    Patient Instructions:   Current Discharge Medication List      START taking these medications    Details   apixaban (ELIQUIS) 5 mg tablet Take 1 Tab by mouth two (2) times a day. Qty: 60 Tab, Refills: 11      propafenone SR (RYTHMOL SR) 225 mg SR capsule Take 1 Cap by mouth every twelve (12) hours. Qty: 60 Cap, Refills: 11         CONTINUE these medications which have NOT CHANGED    Details   metoprolol succinate (TOPROL-XL) 25 mg XL tablet Take 0.5 Tabs by mouth nightly. Stop Lopressor  Qty: 45 Tab, Refills: 3      aspirin delayed-release 81 mg tablet Take 1 Tab by mouth daily. Over the counter for atrial fibrillation  Qty: 90 Tab, Refills: 11             Referenced discharge instructions provided by nursing for diet and activity. Follow up with with Dr. Eva Liriano on 3/23/18    Signed:  Scarlett Nieves NP  3/6/2018  10:41 AM       [facility]    3/6/2018         Patient seen, examined by me personally. Plan discussed as detailed. Agree with note as outlined by  NP. I confirm findings in history and physical exam. No additional findings noted. Agree with plan as outlined above.      Aditya Alvarado MD

## 2018-03-06 NOTE — DISCHARGE INSTRUCTIONS
[address]  [phone]        Patient ID:  Marquis Rodriguez  [id number]  89 y.o.  1960    Admit Date: 3/4/2018    Discharge Date: 3/6/2018     Admitting Physician: Enio Gonzales MD     Discharge Physician:   MD Girish Culver NP    Admission Diagnoses: Atrial fibrillation [facility])  Atrial fibrillation [facility])    Discharge Diagnoses: Principal Problem:    PAF (paroxysmal atrial fibrillation) ([facility] (11/28/2015)    Active Problems:    Lower urinary tract infectious disease (11/28/2015)      Atrial fibrillation ([facility] (3/4/2018)        Discharge Condition: Good    Cardiology Procedures this Admission:  EchoCardiogram    Disposition: home    Patient Instructions:     Reference discharge instructions provided by nursing for diet and activity. Signed:  Girish Benavidez NP  3/6/2018  10:39 AM         Atrial Fibrillation: Care Instructions  Your Care Instructions    Atrial fibrillation is an irregular and often fast heartbeat. Treating this condition is important for several reasons. It can cause blood clots, which can travel from your heart to your brain and cause a stroke. If you have a fast heartbeat, you may feel lightheaded, dizzy, and weak. An irregular heartbeat can also increase your risk for heart failure. Atrial fibrillation is often the result of another heart condition, such as high blood pressure or coronary artery disease. Making changes to improve your heart condition will help you stay healthy and active. Follow-up care is a key part of your treatment and safety. Be sure to make and go to all appointments, and call your doctor if you are having problems. It's also a good idea to know your test results and keep a list of the medicines you take. How can you care for yourself at home? Medicines  ? · Take your medicines exactly as prescribed. Call your doctor if you think you are having a problem with your medicine.  You will get more details on the specific medicines your doctor prescribes. ? · If your doctor has given you a blood thinner to prevent a stroke, be sure you get instructions about how to take your medicine safely. Blood thinners can cause serious bleeding problems. ? · Do not take any vitamins, over-the-counter drugs, or herbal products without talking to your doctor first.   ? Lifestyle changes  ? · Do not smoke. Smoking can increase your chance of a stroke and heart attack. If you need help quitting, talk to your doctor about stop-smoking programs and medicines. These can increase your chances of quitting for good. ? · Eat a heart-healthy diet. ? · Stay at a healthy weight. Lose weight if you need to.   ? · Limit alcohol to 2 drinks a day for men and 1 drink a day for women. Too much alcohol can cause health problems. ? · Avoid colds and flu. Get a pneumococcal vaccine shot. If you have had one before, ask your doctor whether you need another dose. Get a flu shot every year. If you must be around people with colds or flu, wash your hands often. Activity  ? · If your doctor recommends it, get more exercise. Walking is a good choice. Bit by bit, increase the amount you walk every day. Try for at least 30 minutes on most days of the week. You also may want to swim, bike, or do other activities. Your doctor may suggest that you join a cardiac rehabilitation program so that you can have help increasing your physical activity safely. ? · Start light exercise if your doctor says it is okay. Even a small amount will help you get stronger, have more energy, and manage stress. Walking is an easy way to get exercise. Start out by walking a little more than you did in the hospital. Gradually increase the amount you walk. ? · When you exercise, watch for signs that your heart is working too hard. You are pushing too hard if you cannot talk while you are exercising.  If you become short of breath or dizzy or have chest pain, sit down and rest immediately. ? · Check your pulse regularly. Place two fingers on the artery at the palm side of your wrist, in line with your thumb. If your heartbeat seems uneven or fast, talk to your doctor. When should you call for help? Call 911 anytime you think you may need emergency care. For example, call if:  ? · You have symptoms of a heart attack. These may include:  ¨ Chest pain or pressure, or a strange feeling in the chest.  ¨ Sweating. ¨ Shortness of breath. ¨ Nausea or vomiting. ¨ Pain, pressure, or a strange feeling in the back, neck, jaw, or upper belly or in one or both shoulders or arms. ¨ Lightheadedness or sudden weakness. ¨ A fast or irregular heartbeat. After you call 911, the  may tell you to chew 1 adult-strength or 2 to 4 low-dose aspirin. Wait for an ambulance. Do not try to drive yourself. ? · You have symptoms of a stroke. These may include:  ¨ Sudden numbness, tingling, weakness, or loss of movement in your face, arm, or leg, especially on only one side of your body. ¨ Sudden vision changes. ¨ Sudden trouble speaking. ¨ Sudden confusion or trouble understanding simple statements. ¨ Sudden problems with walking or balance. ¨ A sudden, severe headache that is different from past headaches. ? · You passed out (lost consciousness). ?Call your doctor now or seek immediate medical care if:  ? · You have new or increased shortness of breath. ? · You feel dizzy or lightheaded, or you feel like you may faint. ? · Your heart rate becomes irregular. ? · You can feel your heart flutter in your chest or skip heartbeats. Tell your doctor if these symptoms are new or worse. ? Watch closely for changes in your health, and be sure to contact your doctor if you have any problems. Where can you learn more? Go to http://trevor-darrick.info/.   Enter U020 in the search box to learn more about \"Atrial Fibrillation: Care Instructions. \"  Current as of: September 21, 2016  Content Version: 11.4  © 7182-6668 Healthwise, [facility]. Care instructions adapted under license by [employer] (which disclaims liability or warranty for this information). If you have questions about a medical condition or this instruction, always ask your healthcare professional. [place] [address] any warranty or liability for your use of this information.

## 2018-03-23 ENCOUNTER — OFFICE VISIT (OUTPATIENT)
Dept: CARDIOLOGY CLINIC | Age: 58
End: 2018-03-23

## 2018-03-23 VITALS
DIASTOLIC BLOOD PRESSURE: 70 MMHG | SYSTOLIC BLOOD PRESSURE: 118 MMHG | HEART RATE: 76 BPM | BODY MASS INDEX: 38.81 KG/M2 | WEIGHT: 241.5 LBS | RESPIRATION RATE: 17 BRPM | HEIGHT: 66 IN | OXYGEN SATURATION: 96 %

## 2018-03-23 DIAGNOSIS — I49.9 IRREGULAR HEART BEAT: ICD-10-CM

## 2018-03-23 DIAGNOSIS — I48.91 ATRIAL FIBRILLATION, UNSPECIFIED TYPE (HCC): Primary | ICD-10-CM

## 2018-03-23 DIAGNOSIS — Z72.0 TOBACCO ABUSE: ICD-10-CM

## 2018-03-23 DIAGNOSIS — G47.39 OTHER SLEEP APNEA: ICD-10-CM

## 2018-03-23 RX ORDER — ALPRAZOLAM 0.25 MG/1
TABLET ORAL
COMMUNITY
Start: 2018-01-03 | End: 2019-03-07

## 2018-03-23 RX ORDER — METOPROLOL TARTRATE 25 MG/1
TABLET, FILM COATED ORAL
COMMUNITY
Start: 2018-02-25 | End: 2018-06-11

## 2018-03-23 NOTE — PROGRESS NOTES
[address] [address]  [phone]     Subjective:      Dakota Ricci is a 66 y.o. female is here for [facility] follow up  where she was admitted 3/4-3/6 for recurrent AF with RVR. The patient denies chest pain/ shortness of breath, orthopnea, PND, LE edema, syncope, or presyncope. Endorses occasional heart fluttering. Cardiac risk factors: smoking/ tobacco exposure, obesity, sedentary life style, hypertension, stress, post-menopausal.        Patient Active Problem List    Diagnosis Date Noted    Atrial fibrillation ([facility] 03/04/2018    Irregular heart beat 12/07/2015    PAF (paroxysmal atrial fibrillation) ([facility] 11/28/2015    Lower urinary tract infectious disease 11/28/2015    Allergic drug rash due to sulfonamide 11/28/2015      None  Past Medical History:   Diagnosis Date    Atrial fibrillation [facility])       Past Surgical History:   Procedure Laterality Date    HX TUBAL LIGATION       Allergies   Allergen Reactions    Beef Containing Products Hives    Pork/Porcine Containing Products Hives    Macrobid [Nitrofurantoin Monohyd/M-Cryst] Hives    Pcn [Penicillins] Hives      Family History   Problem Relation Age of Onset    Other Other      no known FH of CAD      Social History     Social History    Marital status:      Spouse name: N/A    Number of children: N/A    Years of education: N/A     Occupational History    Not on file.      Social History Main Topics    Smoking status: Current Every Day Smoker     Packs/day: 1.00     Years: 35.00     Types: Cigarettes    Smokeless tobacco: Never Used    Alcohol use 0.0 oz/week     0 Standard drinks or equivalent per week      Comment: rare    Drug use: No    Sexual activity: Not on file     Other Topics Concern    Not on file     Social History Narrative      Current Outpatient Prescriptions   Medication Sig    ALPRAZolam (XANAX) 0.25 mg tablet     apixaban (ELIQUIS) 5 mg tablet Take 1 Tab by mouth two (2) times a day.  propafenone SR (RYTHMOL SR) 225 mg SR capsule Take 1 Cap by mouth every twelve (12) hours.  metoprolol succinate (TOPROL-XL) 25 mg XL tablet Take 0.5 Tabs by mouth nightly. Stop Lopressor    metoprolol tartrate (LOPRESSOR) 25 mg tablet     aspirin delayed-release 81 mg tablet Take 1 Tab by mouth daily. Over the counter for atrial fibrillation     No current facility-administered medications for this visit. Review of Symptoms:  11 systems reviewed, negative other than as stated in the HPI    Physical ExamPhysical Exam:    Vitals:    03/23/18 1043 03/23/18 1106   BP: 120/76 118/70   Pulse: 76    Resp: 17    SpO2: 96%    Weight: 241 lb 8 oz (109.5 kg)    Height: 5' 6\" (1.676 m)      Body mass index is 38.98 kg/(m^2). General PE   Gen:  NAD  Mental Status - Alert. General Appearance - Not in acute distress. Chest and Lung Exam   Inspection: Accessory muscles - No use of accessory muscles in breathing. Auscultation:   Breath sounds: - Normal.   Cardiovascular   Inspection: Jugular vein - Bilateral - Inspection Normal.   Palpation/Percussion:   Apical Impulse: - Normal.   Auscultation: Rhythm - Regular. Heart Sounds - S1 WNL and S2 WNL. No S3 or S4. Murmurs & Other Heart Sounds: Auscultation of the heart reveals - No Murmurs. Peripheral Vascular   Upper Extremity: Inspection - Bilateral - No Cyanotic nailbeds or Digital clubbing. Lower Extremity:   Palpation: Edema - Bilateral - No edema. Abdomen:   Soft, non-tender, bowel sounds are active.   Neuro: A&O times 3, CN and motor grossly WNL    Labs:   Lab Results   Component Value Date/Time    Cholesterol, total 154 02/19/2018 10:13 AM    HDL Cholesterol 49 02/19/2018 10:13 AM    LDL, calculated 91 02/19/2018 10:13 AM    Triglyceride 68 02/19/2018 10:13 AM     Lab Results   Component Value Date/Time    CK 66 02/14/2018 10:06 PM     Lab Results   Component Value Date/Time    Sodium 140 03/06/2018 04:41 AM    Potassium 3.6 03/06/2018 04:41 AM    Chloride 107 03/06/2018 04:41 AM    CO2 25 03/06/2018 04:41 AM    Anion gap 8 03/06/2018 04:41 AM    Glucose 98 03/06/2018 04:41 AM    BUN 12 03/06/2018 04:41 AM    Creatinine 0.71 03/06/2018 04:41 AM    BUN/Creatinine ratio 17 03/06/2018 04:41 AM    GFR est AA >60 03/06/2018 04:41 AM    GFR est non-AA >60 03/06/2018 04:41 AM    Calcium 8.2 (L) 03/06/2018 04:41 AM    Bilirubin, total 0.4 03/04/2018 03:33 PM    AST (SGOT) 13 (L) 03/04/2018 03:33 PM    Alk. phosphatase 89 03/04/2018 03:33 PM    Protein, total 6.9 03/04/2018 03:33 PM    Albumin 3.4 (L) 03/04/2018 03:33 PM    Globulin 3.5 03/04/2018 03:33 PM    A-G Ratio 1.0 (L) 03/04/2018 03:33 PM    ALT (SGPT) 23 03/04/2018 03:33 PM       EKG:  SR, ventricular rate 63     Assessment:        1. Atrial fibrillation, unspecified type ([address]    2. Irregular heart beat    3. Tobacco abuse    4. Other sleep apnea        Orders Placed This Encounter    SLEEP MEDICINE REFERRAL     Referral Priority:   Routine     Referral Type:   Consultation     Referral Reason:   Specialty Services Required     Referral Location:   [address]     Referred to Provider:   Melody Herrera MD     Requested Specialty:   Sleep Medicine    AMB POC EKG ROUTINE W/ 12 LEADS, INTER & REP     Order Specific Question:   Reason for Exam:     Answer:   Routine    AMB POC EKG ROUTINE W/ 12 LEADS, INTER & REP     Order Specific Question:   Reason for Exam:     Answer:   irregular heartbeat    ALPRAZolam (XANAX) 0.25 mg tablet    metoprolol tartrate (LOPRESSOR) 25 mg tablet        Plan:     Paroxysmal Atrial Fib  03/18 Normal EF, mild MR  03/18 Lexiscan nuclear study negative for ischemia  02/18 Holter: Arrhthymias: Initially, paroxysmal atrial fibrillation with RVR. Later, sustained PAF with RVR. Presenting NSR.  Still endorses occasional heart fluttering  Continue BB for rate control, Rhythmol  [id number] score: 2  Continue ASA, Eliquis for [facility].  Tolerating well without overt bleeding    MAYANK  Needs to be scheduled for sleep study      Current 1PPD smoker  Counseled on smoking cessation and provided literature - 5 minutes spent. BMI 38:  Counseled on diet and exercise- eventual goal of 30-60 minutes 5-7 times a week as per AHA guidelines. Goal of 10% (24 lbs) weight loss for 6 months. Continue current care and f/u in 6 months.       Adali Palomo MD

## 2018-03-23 NOTE — PROGRESS NOTES
1. Have you been to the ER, urgent care clinic since your last visit? Hospitalized since your last visit? ED [facility], March 2018,  A fib. 2. Have you seen or consulted any other health care providers outside of the [address] [place] [address] since your last visit? Include any pap smears or colon screening. No    Chief Complaint   Patient presents with    Irregular Heart Beat     2 wk appt. C/O palps.

## 2018-03-23 NOTE — PROGRESS NOTES
1. Have you been to the ER, urgent care clinic since your last visit? Hospitalized since your last visit? Seen in ER at [facility] for afib. 2. Have you seen or consulted any other health care providers outside of the [address] [place] [address] since your last visit? Include any pap smears or colon screening.    No.

## 2018-03-23 NOTE — PROGRESS NOTES
1. Have you been to the ER, urgent care clinic since your last visit? Hospitalized since your last visit? Yes, on 3/4/18 for PAF at [facility]      2. Have you seen or consulted any other health care providers outside of the [address] [place] [address] since your last visit? Include any pap smears or colon screening.

## 2018-05-15 ENCOUNTER — DOCUMENTATION ONLY (OUTPATIENT)
Dept: SLEEP MEDICINE | Age: 58
End: 2018-05-15

## 2018-05-15 ENCOUNTER — OFFICE VISIT (OUTPATIENT)
Dept: SLEEP MEDICINE | Age: 58
End: 2018-05-15

## 2018-05-15 VITALS
DIASTOLIC BLOOD PRESSURE: 79 MMHG | HEIGHT: 66 IN | WEIGHT: 239 LBS | SYSTOLIC BLOOD PRESSURE: 136 MMHG | OXYGEN SATURATION: 96 % | HEART RATE: 71 BPM | BODY MASS INDEX: 38.41 KG/M2

## 2018-05-15 DIAGNOSIS — G47.33 OSA (OBSTRUCTIVE SLEEP APNEA): Primary | ICD-10-CM

## 2018-05-15 NOTE — PROGRESS NOTES
Patient did not schedule sleep study in office, she is traveling out of the country and will call back to schedule when she returns.  We are currently working on Lisa Holley for in lab study

## 2018-05-22 NOTE — PROGRESS NOTES
[address] [place]. [place], [address]  Tel.  [phone]  Fax. 100 [facility] [address]  [place], [address] [facility]  Tel.  [phone]  Fax. [phone] [address] [address] [place]   Tel.  [phone]  Fax. [phone]       Chief Complaint       Chief Complaint   Patient presents with    Sleep Problem     NP; referred by Dr. Gianna Gastelum; Afib,snoring       HPI      Ashwin Urbina is a right handed 66y.o. year old female referred by Dr. Gianna Gastelum for evaluation of a sleep disorder  . The patient reports she has experienced daytime sleepiness, snoring, non-restorative sleep, nocturnal awakening, witnessed apnea. The patient reports she has experienced excessive daytime sleepiness for a number of years and it has worsened. The sleepiness is describes as being significant. The patient has not been taking naps during the day. The patient experiences fatigue when riding as a passenger, seated and inactive, at work. The severity of the day time fatigue has impacted the ability to function normally during the day. The patient reports she has been snoring for a number of years and her snoring worsened. The snoring is exacerbated by sleeping supine. She notes she can not sleep recumbent and has taken to sleep seated. Snoring is heard through closed doors. If recumbent she will awaken every hour. Snoring associated with apnea. The patient retires at 11 pm and awakens at 5:30 am. The patient notes that she will experience frequent awakening from sleep ; evident if she is not upright when sleeping. In general she is able to return to sleep after awakening. she tends to awaken with an alarm. The patient has not undergone diagnostic testing for the current problems.            Sorrento Sleepiness Score: 15       Allergies   Allergen Reactions    Beef Containing Products Hives    Pork/Porcine Containing Products Hives    Macrobid [Nitrofurantoin Monohyd/M-Cryst] Hives    Pcn [Penicillins] Hives       Current Outpatient Prescriptions   Medication Sig Dispense Refill    apixaban (ELIQUIS) 5 mg tablet Take 1 Tab by mouth two (2) times a day. 60 Tab 11    propafenone SR (RYTHMOL SR) 225 mg SR capsule Take 1 Cap by mouth every twelve (12) hours. 60 Cap 11    metoprolol succinate (TOPROL-XL) 25 mg XL tablet Take 0.5 Tabs by mouth nightly. Stop Lopressor 45 Tab 3    aspirin delayed-release 81 mg tablet Take 1 Tab by mouth daily. Over the counter for atrial fibrillation 90 Tab 11    ALPRAZolam (XANAX) 0.25 mg tablet       metoprolol tartrate (LOPRESSOR) 25 mg tablet           She  has a past medical history of Atrial fibrillation ([address] She  has a past surgical history that includes hx tubal ligation. She family history includes Other in an other family member. She  reports that she has been smoking Cigarettes. She has a 35.00 pack-year smoking history. She has never used smokeless tobacco. She reports that she drinks alcohol. She reports that she does not use illicit drugs. Review of Systems:  Review of Systems   Constitutional: Negative for diaphoresis. HENT: Negative for ear pain. Eyes: Negative for double vision. Respiratory: Negative for shortness of breath. Cardiovascular: Positive for palpitations. Gastrointestinal: Negative for abdominal pain. Genitourinary: Negative for urgency. Musculoskeletal: Negative for falls. Skin: Negative for rash. Neurological: Negative for tremors, focal weakness and weakness. Endo/Heme/Allergies: Does not bruise/bleed easily. Psychiatric/Behavioral: The patient is not nervous/anxious. Objective:     Visit Vitals    /79    Pulse 71    Ht 5' 6\" (1.676 m)    Wt 239 lb (108.4 kg)    SpO2 96%    BMI 38.58 kg/m2     Body mass index is 38.58 kg/(m^2).     General:   Conversant, cooperative   Eyes:  Pupils equal and reactive, no nystagmus, fundi clear, flat disk margins. Normal A/V ratio   Oropharynx:   Mallampati score III,  tongue mildly scalloped,    Tonsils:   tonsils normal   Neck:   No carotid bruits; Neck circ. in \"inches\": 15   Chest/Lungs:  Clear on auscultation    CVS:  Normal rate, regular rhythm   Skin:  Warm to touch; no obvious rashes   Neuro:  Speech fluent, face symmetrical, tongue movement normal   Psych:  Normal affect,  normal countenance        Assessment:       ICD-10-CM ICD-9-CM    1. MAYANK (obstructive sleep apnea) G47.33 327.23 SPLIT CPAP/PSG     History of snoring, witnessed apnea, daytime fatigue with symptoms prominent when patient sleeping in bed. Symptoms less evident when supine. She normally sleeps seated which would potentially underestimate severity of sleep disordered breathing. Would consider study seated in recliner; place patient recumbent to access severity of sleep apnea. .    Plan:     Orders Placed This Encounter    SPLIT CPAP/PSG     Standing Status:   Future     Standing Expiration Date:   11/29/2018     Order Specific Question:   Reason for Exam     Answer:   snoring, witnessed apnea non-restorative sleep, daytime fatigue       * Patient has a history and examination consistent with the diagnosis of sleep apnea. * Sleep testing was ordered for initial evaluation. * She was provided information on sleep apnea including corresponding risk factors and the importance of proper treatment. * Treatment options if indicated were reviewed today. Potential benefit of weight reduction was discussed. Weight reduction techniques reviewed.       Benoit Murray MD, Deana Kat  05/29/18

## 2018-05-29 NOTE — PATIENT INSTRUCTIONS
Sleep Apnea: Care Instructions  Your Care Instructions    Sleep apnea means that you frequently stop breathing for 10 seconds or longer during sleep. It can be mild to severe, based on the number of times an hour that you stop breathing or have slowed breathing. Blocked or narrowed airways in your nose, mouth, or throat can cause sleep apnea. Your airway can become blocked when your throat muscles and tongue relax during sleep. You can treat sleep apnea at home by making lifestyle changes. You also can use a CPAP breathing machine that keeps tissues in the throat from blocking your airway. Or your doctor may suggest that you use a breathing device while you sleep. It helps keep your airway open. This could be a device that you put in your mouth. Other examples include strips or disks that you use on your nose. In some cases, surgery may be needed to remove enlarged tissues in the throat. Follow-up care is a key part of your treatment and safety. Be sure to make and go to all appointments, and call your doctor if you are having problems. It's also a good idea to know your test results and keep a list of the medicines you take. How can you care for yourself at home? · Lose weight, if needed. It may reduce the number of times you stop breathing or have slowed breathing. · Sleep on your side. It may stop mild apnea. If you tend to roll onto your back, sew a pocket in the back of your pajama top. Put a tennis ball into the pocket, and stitch the pocket shut. This will help keep you from sleeping on your back. · Avoid alcohol and medicines such as sleeping pills and sedatives before bed. · Do not smoke. Smoking can make sleep apnea worse. If you need help quitting, talk to your doctor about stop-smoking programs and medicines. These can increase your chances of quitting for good. · Prop up the head of your bed 4 to 6 inches by putting bricks under the legs of the bed.   · Treat breathing problems, such as a stuffy nose, caused by a cold or allergies. · Try a continuous positive airway pressure (CPAP) breathing machine if your doctor recommends it. The machine keeps your airway open when you sleep. · If CPAP does not work for you, ask your doctor if you can try other breathing machines. A bilevel positive airway pressure machine uses one type of air pressure for breathing in and another type for breathing out. Another device raises or lowers air pressure as needed while you breathe. · Talk to your doctor if:  ¨ Your nose feels dry or bleeds when you use one of these machines. You may need to increase moisture in the air. A humidifier may help. ¨ Your nose is runny or stuffy from using a breathing machine. Decongestants or a corticosteroid nasal spray may help. ¨ You are sleepy during the day and it gets in the way of the normal things you do. Do not drive when you are drowsy. When should you call for help? Watch closely for changes in your health, and be sure to contact your doctor if:  ? · You still have sleep apnea even though you have made lifestyle changes. ? · You are thinking of trying a device such as CPAP. ? · You are having problems using a CPAP or similar machine. Where can you learn more? Go to http://trevor-darrick.info/. Enter Y607 in the search box to learn more about \"Sleep Apnea: Care Instructions. \"  Current as of: May 12, 2017  Content Version: 11.4  © 7146-5001 [employer]. Care instructions adapted under license by [employer] (which disclaims liability or warranty for this information). If you have questions about a medical condition or this instruction, always ask your healthcare professional. [address] any warranty or liability for your use of this information.

## 2018-06-11 RX ORDER — METOPROLOL SUCCINATE 25 MG/1
12.5 TABLET, EXTENDED RELEASE ORAL
Qty: 45 TAB | Refills: 3 | Status: SHIPPED | OUTPATIENT
Start: 2018-06-11 | End: 2019-01-17 | Stop reason: SDUPTHER

## 2018-08-14 ENCOUNTER — HOSPITAL ENCOUNTER (OUTPATIENT)
Dept: SLEEP MEDICINE | Age: 58
Discharge: HOME | End: 2018-08-14
Payer: COMMERCIAL

## 2018-08-14 DIAGNOSIS — G47.33 OSA (OBSTRUCTIVE SLEEP APNEA): ICD-10-CM

## 2018-08-14 PROCEDURE — 95810 POLYSOM 6/> YRS 4/> PARAM: CPT | Performed by: SPECIALIST

## 2018-08-17 ENCOUNTER — DOCUMENTATION ONLY (OUTPATIENT)
Dept: SLEEP MEDICINE | Age: 58
End: 2018-08-17

## 2018-11-11 ENCOUNTER — HOSPITAL ENCOUNTER (OUTPATIENT)
Dept: SLEEP MEDICINE | Age: 58
Discharge: HOME | End: 2018-11-11
Payer: COMMERCIAL

## 2018-11-11 VITALS
TEMPERATURE: 97.9 F | BODY MASS INDEX: 38.49 KG/M2 | HEART RATE: 81 BPM | SYSTOLIC BLOOD PRESSURE: 119 MMHG | HEIGHT: 66 IN | OXYGEN SATURATION: 98 % | WEIGHT: 239.5 LBS | DIASTOLIC BLOOD PRESSURE: 77 MMHG

## 2018-11-11 DIAGNOSIS — G47.33 OBSTRUCTIVE SLEEP APNEA (ADULT) (PEDIATRIC): ICD-10-CM

## 2018-11-11 PROCEDURE — 95811 POLYSOM 6/>YRS CPAP 4/> PARM: CPT | Performed by: SPECIALIST

## 2018-12-03 ENCOUNTER — DOCUMENTATION ONLY (OUTPATIENT)
Dept: SLEEP MEDICINE | Age: 58
End: 2018-12-03

## 2018-12-05 ENCOUNTER — DOCUMENTATION ONLY (OUTPATIENT)
Dept: SLEEP MEDICINE | Age: 58
End: 2018-12-05

## 2018-12-05 ENCOUNTER — TELEPHONE (OUTPATIENT)
Dept: SLEEP MEDICINE | Age: 58
End: 2018-12-05

## 2019-01-17 NOTE — TELEPHONE ENCOUNTER
Patient needs refill on meds eliquis, toprol-xl and rythmol send to [facility] jermaine enriquez. Pt has appt 3/7/19.

## 2019-01-18 RX ORDER — METOPROLOL SUCCINATE 25 MG/1
12.5 TABLET, EXTENDED RELEASE ORAL
Qty: 45 TAB | Refills: 0 | Status: SHIPPED | OUTPATIENT
Start: 2019-01-18 | End: 2019-03-07 | Stop reason: SDUPTHER

## 2019-01-18 RX ORDER — PROPAFENONE HYDROCHLORIDE 225 MG/1
225 CAPSULE, EXTENDED RELEASE ORAL EVERY 12 HOURS
Qty: 180 CAP | Refills: 0 | Status: SHIPPED | OUTPATIENT
Start: 2019-01-18 | End: 2019-03-07 | Stop reason: SDUPTHER

## 2019-03-07 ENCOUNTER — OFFICE VISIT (OUTPATIENT)
Dept: CARDIOLOGY CLINIC | Age: 59
End: 2019-03-07

## 2019-03-07 VITALS
RESPIRATION RATE: 16 BRPM | BODY MASS INDEX: 40.15 KG/M2 | SYSTOLIC BLOOD PRESSURE: 132 MMHG | HEIGHT: 66 IN | DIASTOLIC BLOOD PRESSURE: 70 MMHG | WEIGHT: 249.8 LBS | OXYGEN SATURATION: 96 % | HEART RATE: 86 BPM

## 2019-03-07 DIAGNOSIS — E66.01 CLASS 3 SEVERE OBESITY DUE TO EXCESS CALORIES WITHOUT SERIOUS COMORBIDITY WITH BODY MASS INDEX (BMI) OF 40.0 TO 44.9 IN ADULT (HCC): ICD-10-CM

## 2019-03-07 DIAGNOSIS — I48.91 ATRIAL FIBRILLATION, UNSPECIFIED TYPE (HCC): Primary | ICD-10-CM

## 2019-03-07 DIAGNOSIS — Z72.0 TOBACCO ABUSE: ICD-10-CM

## 2019-03-07 RX ORDER — METOPROLOL SUCCINATE 25 MG/1
12.5 TABLET, EXTENDED RELEASE ORAL
Qty: 45 TAB | Refills: 4 | Status: SHIPPED | OUTPATIENT
Start: 2019-03-07 | End: 2019-05-22

## 2019-03-07 RX ORDER — PROPAFENONE HYDROCHLORIDE 225 MG/1
225 CAPSULE, EXTENDED RELEASE ORAL EVERY 12 HOURS
Qty: 180 CAP | Refills: 4 | Status: SHIPPED | OUTPATIENT
Start: 2019-03-07 | End: 2020-06-08

## 2019-03-07 NOTE — PROGRESS NOTES
1. Have you been to the ER, urgent care clinic since your last visit? Hospitalized since your last visit? NO    2. Have you seen or consulted any other health care providers outside of the [address] [place] [address] since your last visit? Include any pap smears or colon screening. NO    C/O SOB WHEN WALKING.

## 2019-03-07 NOTE — PROGRESS NOTES
Subjective/HPI:     Ms. Bibiana Pablo is a 64 y.o. female is here for routine f/u. She has a PMHx of PAF, obesity and severe MAYANK on CPAP. She started using CPAP about 3 months ago. She notes she sleeps better and has been feeling better overall with CPAP use. She is going to buy a stationary bike to start exercising. She knows she is out of shape. She is trying to avoid sweets. She denies complaints of chest pains, dizziness, orthopnea, PND or edema. She denies shortness of breath or palpitations. She is going to [facility] in a few weeks to visit her daughter. PCP Provider  None  Past Medical History:   Diagnosis Date    Atrial fibrillation [facility])       Past Surgical History:   Procedure Laterality Date    HX TUBAL LIGATION       Family History   Problem Relation Age of Onset    Other Other         no known FH of CAD     Social History     Socioeconomic History    Marital status:      Spouse name: Not on file    Number of children: Not on file    Years of education: Not on file    Highest education level: Not on file   Social Needs    Financial resource strain: Not on file    Food insecurity - worry: Not on file    Food insecurity - inability: Not on file   [employer] needs - medical: Not on file   [employer] needs - non-medical: Not on file   Occupational History    Not on file   Tobacco Use    Smoking status: Current Every Day Smoker     Packs/day: 1.00     Years: 35.00     Pack years: 35.00     Types: Cigarettes    Smokeless tobacco: Never Used   Substance and Sexual Activity    Alcohol use:  Yes     Alcohol/week: 0.0 oz     Comment: rare    Drug use: No    Sexual activity: Not on file   Other Topics Concern    Not on file   Social History Narrative    Not on file       Allergies   Allergen Reactions    Beef Containing Products Hives    Pork/Porcine Containing Products Hives    Macrobid [Nitrofurantoin Monohyd/M-Cryst] Hives    Pcn [Penicillins] Hives Current Outpatient Medications   Medication Sig    apixaban (ELIQUIS) 5 mg tablet Take 1 Tab by mouth two (2) times a day.  metoprolol succinate (TOPROL-XL) 25 mg XL tablet Take 0.5 Tabs by mouth nightly.  propafenone SR (RYTHMOL SR) 225 mg SR capsule Take 1 Cap by mouth every twelve (12) hours. No current facility-administered medications for this visit. I have reviewed the problem list, allergy list, medical history, family, social history and medications. Review of Symptoms:    Review of Systems   Constitutional: Negative for chills, fever and weight loss. HENT: Negative for nosebleeds. Eyes: Negative for blurred vision and double vision. Respiratory: Negative for cough, shortness of breath and wheezing. Cardiovascular: Negative for chest pain, palpitations, orthopnea, leg swelling and PND. Gastrointestinal: Negative for abdominal pain, blood in stool, diarrhea, nausea and vomiting. Musculoskeletal: Negative for joint pain. Skin: Negative for rash. Neurological: Negative for dizziness, tingling and loss of consciousness. Endo/Heme/Allergies: Does not bruise/bleed easily. Physical Exam:      General: Well developed, in no acute distress, cooperative and alert  HEENT: No carotid bruits, no JVD, trach is midline. Neck Supple, PEERL, EOM intact. Heart:  reg rate and rhythm; normal S1/S2; no murmurs, gallops or rubs.   Respiratory: Clear bilaterally x 4, no wheezing or rales  Abdomen:   Soft, non-tender, no distention, no masses. + BS.   Extremities:  Normal cap refill, no cyanosis, atraumatic. No edema. Neuro: A&Ox3, speech clear, gait stable. Skin: Skin color is normal. No rashes or lesions.  Non diaphoretic  Vascular: 2+ pulses symmetric in all extremities    Vitals:    03/07/19 0934 03/07/19 0941   BP: 130/74 132/70   Pulse: 86    Resp: 16    SpO2: 96%    Weight: 249 lb 12.8 oz (113.3 kg)    Height: 5' 6\" (1.676 m)        Cardiographics    ECG: sinus rhythm  Results for orders placed or performed during the hospital encounter of 03/04/18   EKG, 12 LEAD, INITIAL   Result Value Ref Range    Ventricular Rate 152 BPM    Atrial Rate 156 BPM    P-R Interval 170 ms    QRS Duration 80 ms    Q-T Interval 304 ms    QTC Calculation (Bezet) 483 ms    Calculated P Axis 59 degrees    Calculated R Axis 15 degrees    Calculated T Axis -132 degrees    Diagnosis       Atrial fibrillation  Marked ST abnormality, possible inferior subendocardial injury  When compared with ECG of 14-FEB-2018 23:10,  Vent. rate has increased BY  77 BPM  ST now depressed in Inferior leads  T wave inversion more evident in Lateral leads  Confirmed by Fransico Angela ([id number]) on 3/5/2018 11:08:45 AM         Cardiology Labs:  Lab Results   Component Value Date/Time    Cholesterol, total 154 02/19/2018 10:13 AM    HDL Cholesterol 49 02/19/2018 10:13 AM    LDL, calculated 91 02/19/2018 10:13 AM    Triglyceride 68 02/19/2018 10:13 AM       Lab Results   Component Value Date/Time    Sodium 140 03/06/2018 04:41 AM    Potassium 3.6 03/06/2018 04:41 AM    Chloride 107 03/06/2018 04:41 AM    CO2 25 03/06/2018 04:41 AM    Anion gap 8 03/06/2018 04:41 AM    Glucose 98 03/06/2018 04:41 AM    BUN 12 03/06/2018 04:41 AM    Creatinine 0.71 03/06/2018 04:41 AM    BUN/Creatinine ratio 17 03/06/2018 04:41 AM    GFR est AA >60 03/06/2018 04:41 AM    GFR est non-AA >60 03/06/2018 04:41 AM    Calcium 8.2 (L) 03/06/2018 04:41 AM    Bilirubin, total 0.4 03/04/2018 03:33 PM    AST (SGOT) 13 (L) 03/04/2018 03:33 PM    Alk. phosphatase 89 03/04/2018 03:33 PM    Protein, total 6.9 03/04/2018 03:33 PM    Albumin 3.4 (L) 03/04/2018 03:33 PM    Globulin 3.5 03/04/2018 03:33 PM    A-G Ratio 1.0 (L) 03/04/2018 03:33 PM    ALT (SGPT) 23 03/04/2018 03:33 PM           Assessment:     Assessment:       ICD-10-CM ICD-9-CM    1. Atrial fibrillation, unspecified type ([facility] [address] I48.91 427.31 AMB POC EKG ROUTINE W/ 12 LEADS, INTER & REP   2. Tobacco abuse Z72.0 305.1    3. Class 3 severe obesity due to excess calories without serious comorbidity with body mass index (BMI) of 40.0 to 44.9 in adult ([facility]) E66.01 278.01     Z68.41 V85.41      Orders Placed This Encounter    AMB POC EKG ROUTINE W/ 12 LEADS, INTER & REP     Order Specific Question:   Reason for Exam:     Answer:   ROUTINE    apixaban (ELIQUIS) 5 mg tablet     Sig: Take 1 Tab by mouth two (2) times a day. Dispense:  180 Tab     Refill:  4    metoprolol succinate (TOPROL-XL) 25 mg XL tablet     Sig: Take 0.5 Tabs by mouth nightly. Dispense:  45 Tab     Refill:  4    propafenone SR (RYTHMOL SR) 225 mg SR capsule     Sig: Take 1 Cap by mouth every twelve (12) hours. Dispense:  180 Cap     Refill:  4        Plan:     1. Atrial fibrillation, unspecified type ([address]  Maintaining sinus rhythm. Continue Lopressor, propafenone and Eliquis therapy. Discussed importance of CPAP compliance, as treated MAYANK can help reduce recurrence of A fib.  3 medications refilled today. - AMB POC EKG ROUTINE W/ 12 LEADS, INTER & REP    2. Tobacco abuse  Continues to smoke 1 ppd, but is going to try vaping with nicotine instead, to wean off cigarettes. Then she plans to wean off Nicotine. She is not interested in Chantix/Wellbutrin or nicotine patches. 3. Class 3 severe obesity due to excess calories without serious comorbidity with body mass index (BMI) of 40.0 to 44.9 in adult [facility])  Discussed exercising at least 30 minutes daily and calorie restricted diet. Follow up in 1 year, sooner as needed.      Gaurav Collado, PARIP-BC  [facility] MD

## 2019-05-22 ENCOUNTER — OFFICE VISIT (OUTPATIENT)
Dept: CARDIOLOGY CLINIC | Age: 59
End: 2019-05-22

## 2019-05-22 VITALS
WEIGHT: 246.8 LBS | DIASTOLIC BLOOD PRESSURE: 70 MMHG | RESPIRATION RATE: 16 BRPM | OXYGEN SATURATION: 96 % | HEART RATE: 73 BPM | SYSTOLIC BLOOD PRESSURE: 122 MMHG | HEIGHT: 66 IN | BODY MASS INDEX: 39.66 KG/M2

## 2019-05-22 DIAGNOSIS — R79.0 LOW MAGNESIUM LEVEL: ICD-10-CM

## 2019-05-22 DIAGNOSIS — I48.0 PAF (PAROXYSMAL ATRIAL FIBRILLATION) (HCC): ICD-10-CM

## 2019-05-22 DIAGNOSIS — R00.2 HEART PALPITATIONS: Primary | ICD-10-CM

## 2019-05-22 RX ORDER — METOPROLOL SUCCINATE 25 MG/1
25 TABLET, EXTENDED RELEASE ORAL
Qty: 90 TAB | Refills: 3 | Status: SHIPPED | OUTPATIENT
Start: 2019-05-22 | End: 2020-06-08

## 2019-05-22 NOTE — PROGRESS NOTES
Sagrario Gonzalez DNP, ANP-BC  Subjective/HPI:     Corie Castorena is a 64 y.o. female is here for emergency room follow-up. She was seen May 16, 2019 at [facility] for A. fib RVR. Heart rate 133. She converted after IV metoprolol and diltiazem. She has maintained Rythmol 225 mg twice a day, she was on 12.5 mg Toprol nightly this was increased to 25 mg after the ER visit and reports no longer having any fluttering palpitations. The lab work from the ER was reviewed on [facility] everywhere, labs all normal with the exception of magnesium 1.9, she received a run of mag sulfate 2 g. Patient has a history of obstructive sleep apnea: She is using CPAP nightly. No alcohol use, states she was well-hydrated during her vacation. PCP Provider  None  Past Medical History:   Diagnosis Date    Atrial fibrillation [facility])       Past Surgical History:   Procedure Laterality Date    HX TUBAL LIGATION       Allergies   Allergen Reactions    Beef Containing Products Hives    Pork/Porcine Containing Products Hives    Erythromycin Other (comments)    Macrobid [Nitrofurantoin Monohyd/M-Cryst] Hives    Pcn [Penicillins] Hives      Family History   Problem Relation Age of Onset    Other Other         no known FH of CAD      Current Outpatient Medications   Medication Sig    metoprolol succinate (TOPROL-XL) 25 mg XL tablet Take 1 Tab by mouth nightly. Increased 5/2019    apixaban (ELIQUIS) 5 mg tablet Take 1 Tab by mouth two (2) times a day.  propafenone SR (RYTHMOL SR) 225 mg SR capsule Take 1 Cap by mouth every twelve (12) hours. No current facility-administered medications for this visit.        Vitals:    05/22/19 1310 05/22/19 1322   BP: 116/70 122/70   Pulse: 73    Resp: 16    SpO2: 96%    Weight: 246 lb 12.8 oz (111.9 kg)    Height: 5' 6\" (1.676 m)      Social History     Socioeconomic History    Marital status:      Spouse name: Not on file    Number of children: Not on file    Years of education: Not on file    Highest education level: Not on file   Occupational History    Not on file   Social Needs    Financial resource strain: Not on file    Food insecurity:     Worry: Not on file     Inability: Not on file    Transportation needs:     Medical: Not on file     Non-medical: Not on file   Tobacco Use    Smoking status: Current Every Day Smoker     Packs/day: 1.00     Years: 35.00     Pack years: 35.00     Types: Cigarettes    Smokeless tobacco: Never Used   Substance and Sexual Activity    Alcohol use: Yes     Alcohol/week: 0.0 oz     Comment: rare    Drug use: No    Sexual activity: Not on file   Lifestyle    Physical activity:     Days per week: Not on file     Minutes per session: Not on file    Stress: Not on file   Relationships    Social connections:     Talks on phone: Not on file     Gets together: Not on file     Attends Yarsanism service: Not on file     Active member of club or organization: Not on file     Attends meetings of clubs or organizations: Not on file     Relationship status: Not on file    Intimate partner violence:     Fear of current or ex partner: Not on file     Emotionally abused: Not on file     Physically abused: Not on file     Forced sexual activity: Not on file   Other Topics Concern    Not on file   Social History Narrative    Not on file       I have reviewed the nurses notes, vitals, problem list, allergy list, medical history, family, social history and medications. Review of Symptoms:    General: Pt denies excessive weight gain or loss. Pt is able to conduct ADL's  HEENT: Denies blurred vision, headaches, epistaxis and difficulty swallowing. Respiratory: Denies shortness of breath, NIETO, wheezing or stridor.   Cardiovascular: Denies precordial pain, palpitations, edema or PND  Gastrointestinal: Denies poor appetite, indigestion, abdominal pain or blood in stool  Musculoskeletal: Denies pain or swelling from muscles or joints  Neurologic: Denies tremor, paresthesias, or sensory motor disturbance  Skin: Denies rash, itching or texture change. Physical Exam:      General: Well developed, in no acute distress, cooperative and alert  HEENT: No carotid bruits, no JVD, trach is midline. Neck Supple, PEERL, EOM intact. Heart:  Normal S1/S2 negative S3 or S4. Regular, no murmur, gallop or rub.   Respiratory: Clear bilaterally x 4, no wheezing or rales  Abdomen:   Soft, non-tender, no masses, bowel sounds are active.   Extremities:  No edema, normal cap refill, no cyanosis, atraumatic. Neuro: A&Ox3, speech clear, gait stable. Skin: Skin color is normal. No rashes or lesions. Non diaphoretic  Vascular: 2+ pulses symmetric in all extremities    Cardiographics    ECG: Normal sinus rhythm  Results for orders placed or performed during the hospital encounter of 03/04/18   EKG, 12 LEAD, INITIAL   Result Value Ref Range    Ventricular Rate 152 BPM    Atrial Rate 156 BPM    P-R Interval 170 ms    QRS Duration 80 ms    Q-T Interval 304 ms    QTC Calculation (Bezet) 483 ms    Calculated P Axis 59 degrees    Calculated R Axis 15 degrees    Calculated T Axis -132 degrees    Diagnosis       Atrial fibrillation  Marked ST abnormality, possible inferior subendocardial injury  When compared with ECG of 14-FEB-2018 23:10,  Vent.  rate has increased BY  77 BPM  ST now depressed in Inferior leads  T wave inversion more evident in Lateral leads  Confirmed by Ute Mathew ([id number]) on 3/5/2018 11:08:45 AM           Cardiology Labs:  Lab Results   Component Value Date/Time    Cholesterol, total 154 02/19/2018 10:13 AM    HDL Cholesterol 49 02/19/2018 10:13 AM    LDL, calculated 91 02/19/2018 10:13 AM    Triglyceride 68 02/19/2018 10:13 AM       Lab Results   Component Value Date/Time    Sodium 140 03/06/2018 04:41 AM    Potassium 3.6 03/06/2018 04:41 AM    Chloride 107 03/06/2018 04:41 AM    CO2 25 03/06/2018 04:41 AM    Anion gap 8 03/06/2018 04:41 AM    Glucose 98 03/06/2018 04:41 AM    BUN 12 03/06/2018 04:41 AM    Creatinine 0.71 03/06/2018 04:41 AM    BUN/Creatinine ratio 17 03/06/2018 04:41 AM    GFR est AA >60 03/06/2018 04:41 AM    GFR est non-AA >60 03/06/2018 04:41 AM    Calcium 8.2 (L) 03/06/2018 04:41 AM    Bilirubin, total 0.4 03/04/2018 03:33 PM    AST (SGOT) 13 (L) 03/04/2018 03:33 PM    Alk. phosphatase 89 03/04/2018 03:33 PM    Protein, total 6.9 03/04/2018 03:33 PM    Albumin 3.4 (L) 03/04/2018 03:33 PM    Globulin 3.5 03/04/2018 03:33 PM    A-G Ratio 1.0 (L) 03/04/2018 03:33 PM    ALT (SGPT) 23 03/04/2018 03:33 PM           Assessment:     Assessment:     Diagnoses and all orders for this visit:    1. Heart palpitations  -     AMB POC EKG ROUTINE W/ 12 LEADS, INTER & REP  -     MAGNESIUM  -     METABOLIC PANEL, BASIC    2. PAF (paroxysmal atrial fibrillation) ([facility] [address]    3. Low magnesium level    Other orders  -     metoprolol succinate (TOPROL-XL) 25 mg XL tablet; Take 1 Tab by mouth nightly. Increased 5/2019        ICD-10-CM ICD-9-CM    1. Heart palpitations R00.2 785.1 AMB POC EKG ROUTINE W/ 12 LEADS, INTER & REP      MAGNESIUM      METABOLIC PANEL, BASIC   2. PAF (paroxysmal atrial fibrillation) ([facility]) I48.0 427.31    3. Low magnesium level R79.0 790.6      Orders Placed This Encounter    MAGNESIUM    METABOLIC PANEL, BASIC    AMB POC EKG ROUTINE W/ 12 LEADS, INTER & REP     Order Specific Question:   Reason for Exam:     Answer:   Routine    metoprolol succinate (TOPROL-XL) 25 mg XL tablet     Sig: Take 1 Tab by mouth nightly. Increased 5/2019     Dispense:  90 Tab     Refill:  3        Plan:     Patient is a 79-year-old female with a history of paroxysmal atrial fibrillation who had a breakthrough A. fib RVR event May 16, 2019 converting in the ER with IV Lopressor and diltiazem.   She presents back in normal sinus rhythm tolerating higher dose of beta-blocker without lightheadedness dizziness shortness of breath or chest pain.  Paroxysmal atrial fibrillation: Maintain Rythmol 225 mg twice a day, metoprolol XL 25 mg full tablet at nighttime, anticoagulated with Eliquis. Will recheck electrolytes and mag level. If she runs low/low normal mag level will prescribe mag oxide. Discussed alternative therapy for A. fib treatment including ablation, prefers to maintain medical approach at this time. Follow-up with Dr. Galen Varela in 3 months    Antonia Mcneal NP    This note was created using voice recognition software. Despite editing, there may be syntax errors.

## 2019-05-22 NOTE — PROGRESS NOTES
1. Have you been to the ER, urgent care clinic since your last visit? Hospitalized since your last visit? Yes When: 5/17/19 for afib    2. Have you seen or consulted any other health care providers outside of the [address] [place] [address] since your last visit? Include any pap smears or colon screening.  No     Chief Complaint   Patient presents with   [facility] Follow Up     for PAF; per pt, feels better with increased metoprolol     Palpitations

## 2019-05-23 LAB
BUN SERPL-MCNC: 19 MG/DL (ref 6–24)
BUN/CREAT SERPL: 22 (ref 9–23)
CALCIUM SERPL-MCNC: 9.6 MG/DL (ref 8.7–10.2)
CHLORIDE SERPL-SCNC: 102 MMOL/L (ref 96–106)
CO2 SERPL-SCNC: 23 MMOL/L (ref 20–29)
CREAT SERPL-MCNC: 0.85 MG/DL (ref 0.57–1)
GLUCOSE SERPL-MCNC: 95 MG/DL (ref 65–99)
MAGNESIUM SERPL-MCNC: 2 MG/DL (ref 1.6–2.3)
POTASSIUM SERPL-SCNC: 4.4 MMOL/L (ref 3.5–5.2)
SODIUM SERPL-SCNC: 140 MMOL/L (ref 134–144)

## 2019-05-23 NOTE — PROGRESS NOTES
Sandra: Please call patient let her know that her electrolytes look normal, magnesium level level is normal at 2.0. No need for adding any additional supplements.

## 2019-05-24 NOTE — PROGRESS NOTES
Spoke to patient using 2 identifiers. Per Melony Babcock NP, patient was made aware her electrolytes look normal.  Magnesium level is normal at 2.0. No need for adding additional supplements. Patient verbalized understanding.

## 2019-09-05 ENCOUNTER — HOSPITAL ENCOUNTER (EMERGENCY)
Age: 59
Discharge: HOME | End: 2019-09-05
Attending: EMERGENCY MEDICINE | Admitting: EMERGENCY MEDICINE
Payer: COMMERCIAL

## 2019-09-05 ENCOUNTER — APPOINTMENT (OUTPATIENT)
Dept: GENERAL RADIOLOGY | Age: 59
End: 2019-09-05
Attending: EMERGENCY MEDICINE
Payer: COMMERCIAL

## 2019-09-05 VITALS
HEART RATE: 81 BPM | TEMPERATURE: 98 F | BODY MASS INDEX: 39.82 KG/M2 | RESPIRATION RATE: 18 BRPM | HEIGHT: 66 IN | SYSTOLIC BLOOD PRESSURE: 135 MMHG | WEIGHT: 247.8 LBS | OXYGEN SATURATION: 98 % | DIASTOLIC BLOOD PRESSURE: 73 MMHG

## 2019-09-05 DIAGNOSIS — R00.2 PALPITATIONS: Primary | ICD-10-CM

## 2019-09-05 LAB
ALBUMIN SERPL-MCNC: 3.8 G/DL (ref 3.5–5)
ALBUMIN/GLOB SERPL: 1 {RATIO} (ref 1.1–2.2)
ALP SERPL-CCNC: 85 U/L (ref 45–117)
ALT SERPL-CCNC: 27 U/L (ref 12–78)
ANION GAP SERPL CALC-SCNC: 5 MMOL/L (ref 5–15)
AST SERPL-CCNC: 17 U/L (ref 15–37)
BASOPHILS # BLD: 0.1 K/UL (ref 0–0.1)
BASOPHILS NFR BLD: 1 % (ref 0–1)
BILIRUB SERPL-MCNC: 0.4 MG/DL (ref 0.2–1)
BUN SERPL-MCNC: 15 MG/DL (ref 6–20)
BUN/CREAT SERPL: 17 (ref 12–20)
CALCIUM SERPL-MCNC: 9.2 MG/DL (ref 8.5–10.1)
CHLORIDE SERPL-SCNC: 106 MMOL/L (ref 97–108)
CK SERPL-CCNC: 73 U/L (ref 26–192)
CO2 SERPL-SCNC: 27 MMOL/L (ref 21–32)
CREAT SERPL-MCNC: 0.88 MG/DL (ref 0.55–1.02)
DIFFERENTIAL METHOD BLD: ABNORMAL
EOSINOPHIL # BLD: 0.2 K/UL (ref 0–0.4)
EOSINOPHIL NFR BLD: 2 % (ref 0–7)
ERYTHROCYTE [DISTWIDTH] IN BLOOD BY AUTOMATED COUNT: 13.7 % (ref 11.5–14.5)
GLOBULIN SER CALC-MCNC: 3.9 G/DL (ref 2–4)
GLUCOSE SERPL-MCNC: 100 MG/DL (ref 65–100)
HCT VFR BLD AUTO: 49.6 % (ref 35–47)
HGB BLD-MCNC: 15.9 G/DL (ref 11.5–16)
IMM GRANULOCYTES # BLD AUTO: 0.1 K/UL (ref 0–0.04)
IMM GRANULOCYTES NFR BLD AUTO: 1 % (ref 0–0.5)
LYMPHOCYTES # BLD: 1.9 K/UL (ref 0.8–3.5)
LYMPHOCYTES NFR BLD: 19 % (ref 12–49)
MAGNESIUM SERPL-MCNC: 2.2 MG/DL (ref 1.6–2.4)
MCH RBC QN AUTO: 29.7 PG (ref 26–34)
MCHC RBC AUTO-ENTMCNC: 32.1 G/DL (ref 30–36.5)
MCV RBC AUTO: 92.7 FL (ref 80–99)
MONOCYTES # BLD: 0.6 K/UL (ref 0–1)
MONOCYTES NFR BLD: 6 % (ref 5–13)
NEUTS SEG # BLD: 7.4 K/UL (ref 1.8–8)
NEUTS SEG NFR BLD: 71 % (ref 32–75)
NRBC # BLD: 0 K/UL (ref 0–0.01)
NRBC BLD-RTO: 0 PER 100 WBC
PLATELET # BLD AUTO: 250 K/UL (ref 150–400)
PMV BLD AUTO: 10.4 FL (ref 8.9–12.9)
POTASSIUM SERPL-SCNC: 3.7 MMOL/L (ref 3.5–5.1)
PROT SERPL-MCNC: 7.7 G/DL (ref 6.4–8.2)
RBC # BLD AUTO: 5.35 M/UL (ref 3.8–5.2)
SODIUM SERPL-SCNC: 138 MMOL/L (ref 136–145)
TROPONIN I SERPL-MCNC: <0.05 NG/ML
WBC # BLD AUTO: 10.3 K/UL (ref 3.6–11)

## 2019-09-05 PROCEDURE — 85025 COMPLETE CBC W/AUTO DIFF WBC: CPT

## 2019-09-05 PROCEDURE — 99283 EMERGENCY DEPT VISIT LOW MDM: CPT

## 2019-09-05 PROCEDURE — 71046 X-RAY EXAM CHEST 2 VIEWS: CPT

## 2019-09-05 PROCEDURE — 83735 ASSAY OF MAGNESIUM: CPT

## 2019-09-05 PROCEDURE — 93005 ELECTROCARDIOGRAM TRACING: CPT

## 2019-09-05 PROCEDURE — 80053 COMPREHEN METABOLIC PANEL: CPT

## 2019-09-05 PROCEDURE — 36415 COLL VENOUS BLD VENIPUNCTURE: CPT

## 2019-09-05 PROCEDURE — 82550 ASSAY OF CK (CPK): CPT

## 2019-09-05 PROCEDURE — 84484 ASSAY OF TROPONIN QUANT: CPT

## 2019-09-05 NOTE — ED PROVIDER NOTES
EMERGENCY DEPARTMENT HISTORY AND PHYSICAL EXAM      Date: 9/5/2019  Patient Name: Isadora Horner    History of Presenting Illness     Chief Complaint   Patient presents with    Palpitations     states that she has a hx of afib and reports palpitations since 1630 today without chest pain, nausea or SOB. She is currently prescribed rhymol, metoprolol and eliquis and followed by DR Bela Smalls       History Provided By: Patient    HPI: Isadora Horner, 64 y.o. female with history of A. fib controlled with Rythmol, metoprolol anticoagulated on Eliquis followed by Dr. Theresa Lr presents to the ED with cc of palpitations. Patient states that earlier this evening she developed palpitations that were intermittent. When she gets them she does develop lightheadedness and shortness of breath. Currently she denies any symptoms. Denies any chest pain or chest tightness even when the palpitations are ongoing. Denies any recent illness or changes to her medication. She has been compliant with her medications. There are no other complaints, changes, or physical findings at this time. PCP: None    No current facility-administered medications on file prior to encounter. Current Outpatient Medications on File Prior to Encounter   Medication Sig Dispense Refill    metoprolol succinate (TOPROL-XL) 25 mg XL tablet Take 1 Tab by mouth nightly. Increased 5/2019 90 Tab 3    apixaban (ELIQUIS) 5 mg tablet Take 1 Tab by mouth two (2) times a day. 180 Tab 4    propafenone SR (RYTHMOL SR) 225 mg SR capsule Take 1 Cap by mouth every twelve (12) hours.  180 Cap 4       Past History     Past Medical History:  Past Medical History:   Diagnosis Date    Atrial fibrillation ([address]        Past Surgical History:  Past Surgical History:   Procedure Laterality Date    HX TUBAL LIGATION         Family History:  Family History   Problem Relation Age of Onset    Other Other         no known FH of CAD       Social History:  Social History     Tobacco Use    Smoking status: Current Every Day Smoker     Packs/day: 1.00     Years: 35.00     Pack years: 35.00     Types: Cigarettes    Smokeless tobacco: Never Used   Substance Use Topics    Alcohol use: Yes     Alcohol/week: 0.0 standard drinks     Comment: rare    Drug use: No       Allergies: Allergies   Allergen Reactions    Beef Containing Products Hives    Pork/Porcine Containing Products Hives    Erythromycin Other (comments)    Macrobid [Nitrofurantoin Monohyd/M-Cryst] Hives    Pcn [Penicillins] Hives         Review of Systems   Review of Systems   Constitutional: Negative for chills and fever. HENT: Negative. Eyes: Negative for visual disturbance. Respiratory: Negative for cough and shortness of breath. Cardiovascular: Positive for palpitations. Negative for chest pain. Gastrointestinal: Negative for abdominal pain, nausea and vomiting. Genitourinary: Negative. Musculoskeletal: Negative for back pain. Skin: Negative for color change and rash. Neurological: Negative for dizziness, weakness, light-headedness and headaches. Hematological: Does not bruise/bleed easily. All other systems reviewed and are negative. Physical Exam   Physical Exam   Constitutional: She is oriented to person, place, and time. She appears well-developed and well-nourished. No distress. HENT:   Head: Normocephalic and atraumatic. Eyes: Pupils are equal, round, and reactive to light. EOM are normal.   Neck: Normal range of motion. Neck supple. No JVD present. Cardiovascular: Normal rate, regular rhythm, normal heart sounds and intact distal pulses. No murmur heard. Pulmonary/Chest: Effort normal and breath sounds normal. No respiratory distress. She has no wheezes. Abdominal: Soft. She exhibits no distension. There is no tenderness. There is no rebound. Musculoskeletal: Normal range of motion. She exhibits no edema.    Neurological: She is alert and oriented to person, place, and time. Skin: Skin is warm and dry. No rash noted. She is not diaphoretic. No erythema. Diagnostic Study Results     Labs -     Recent Results (from the past 12 hour(s))   EKG, 12 LEAD, INITIAL    Collection Time: 09/05/19  5:24 PM   Result Value Ref Range    Ventricular Rate 82 BPM    Atrial Rate 82 BPM    P-R Interval 148 ms    QRS Duration 94 ms    Q-T Interval 384 ms    QTC Calculation (Bezet) 448 ms    Calculated P Axis 49 degrees    Calculated R Axis -24 degrees    Calculated T Axis 42 degrees    Diagnosis       Normal sinus rhythm  Incomplete right bundle branch block  T wave abnormality, consider lateral ischemia  When compared with ECG of 05-MAR-2018 08:11,  No significant change was found     CBC WITH AUTOMATED DIFF    Collection Time: 09/05/19  6:39 PM   Result Value Ref Range    WBC 10.3 3.6 - 11.0 K/uL    RBC 5.35 (H) 3.80 - 5.20 M/uL    HGB 15.9 11.5 - 16.0 g/dL    HCT 49.6 (H) 35.0 - 47.0 %    MCV 92.7 80.0 - 99.0 FL    MCH 29.7 26.0 - 34.0 PG    MCHC 32.1 30.0 - 36.5 g/dL    RDW 13.7 11.5 - 14.5 %    PLATELET 796 445 - 021 K/uL    MPV 10.4 8.9 - 12.9 FL    NRBC 0.0 0  WBC    ABSOLUTE NRBC 0.00 0.00 - 0.01 K/uL    NEUTROPHILS 71 32 - 75 %    LYMPHOCYTES 19 12 - 49 %    MONOCYTES 6 5 - 13 %    EOSINOPHILS 2 0 - 7 %    BASOPHILS 1 0 - 1 %    IMMATURE GRANULOCYTES 1 (H) 0.0 - 0.5 %    ABS. NEUTROPHILS 7.4 1.8 - 8.0 K/UL    ABS. LYMPHOCYTES 1.9 0.8 - 3.5 K/UL    ABS. MONOCYTES 0.6 0.0 - 1.0 K/UL    ABS. EOSINOPHILS 0.2 0.0 - 0.4 K/UL    ABS. BASOPHILS 0.1 0.0 - 0.1 K/UL    ABS. IMM.  GRANS. 0.1 (H) 0.00 - 0.04 K/UL    DF AUTOMATED     METABOLIC PANEL, COMPREHENSIVE    Collection Time: 09/05/19  6:39 PM   Result Value Ref Range    Sodium 138 136 - 145 mmol/L    Potassium 3.7 3.5 - 5.1 mmol/L    Chloride 106 97 - 108 mmol/L    CO2 27 21 - 32 mmol/L    Anion gap 5 5 - 15 mmol/L    Glucose 100 65 - 100 mg/dL    BUN 15 6 - 20 MG/DL    Creatinine 0.88 0.55 - 1.02 MG/DL    BUN/Creatinine ratio 17 12 - 20      GFR est AA >60 >60 ml/min/1.73m2    GFR est non-AA >60 >60 ml/min/1.73m2    Calcium 9.2 8.5 - 10.1 MG/DL    Bilirubin, total 0.4 0.2 - 1.0 MG/DL    ALT (SGPT) 27 12 - 78 U/L    AST (SGOT) 17 15 - 37 U/L    Alk. phosphatase 85 45 - 117 U/L    Protein, total 7.7 6.4 - 8.2 g/dL    Albumin 3.8 3.5 - 5.0 g/dL    Globulin 3.9 2.0 - 4.0 g/dL    A-G Ratio 1.0 (L) 1.1 - 2.2     TROPONIN I    Collection Time: 09/05/19  6:39 PM   Result Value Ref Range    Troponin-I, Qt. <0.05 <0.05 ng/mL   CK W/ REFLX CKMB    Collection Time: 09/05/19  6:39 PM   Result Value Ref Range    CK 73 26 - 192 U/L   MAGNESIUM    Collection Time: 09/05/19  6:39 PM   Result Value Ref Range    Magnesium 2.2 1.6 - 2.4 mg/dL       Radiologic Studies -   XR CHEST PA LAT   Final Result   IMPRESSION: No acute cardiopulmonary process. CT Results  (Last 48 hours)    None        CXR Results  (Last 48 hours)               09/05/19 1834  XR CHEST PA LAT Final result    Impression:  IMPRESSION: No acute cardiopulmonary process. Narrative:  EXAM:  XR CHEST PA LAT       INDICATION:   palpitations       COMPARISON: Chest radiograph 3/4/2018. FINDINGS: PA and lateral radiographs of the chest were obtained. No evidence of focal consolidation. No pleural effusion or pneumothorax. Heart,   bernadine, mediastinum are within normal limits. No acute osseous abnormalities. Medical Decision Making   I am the first provider for this patient. I reviewed the vital signs, available nursing notes, past medical history, past surgical history, family history and social history. Vital Signs-Reviewed the patient's vital signs.   Patient Vitals for the past 12 hrs:   Temp Pulse Resp BP SpO2   09/05/19 1732 98 °F (36.7 °C) 81 18 135/73 98 %         Records Reviewed: Nursing Notes, Old Medical Records, Previous electrocardiograms, Previous Radiology Studies and Previous Laboratory Studies    Provider Notes (Medical Decision Making): This is a 79-year-old female here with palpitations and concerned that she was going to convert into atrial fibrillation. On examination she is in no acute distress. She is afebrile and vital signs stable throughout entire ED stay. EKG does not demonstrate any ectopy and she has been normal sinus rhythm on cardiac monitor throughout entire ED stay. Electrolytes within normal limits. Troponin negative. She has no complaints at this time. She is stable for discharge home at this time with follow-up with her cardiologist.  She was given strict return ED precautions and agrees plan as above. Discharged home in stable condition. ED Course:   Initial assessment performed. The patients presenting problems have been discussed, and they are in agreement with the care plan formulated and outlined with them. I have encouraged them to ask questions as they arise throughout their visit. ED Course as of Sep 05 2303   Thu Sep 05, 2019   1953 EKG per my interpretation, sinus rhythm, rate 82 bpm, normal axis, T wave inversion in lateral leads which is unchanged when compared to prior EKG dated May 2019    [AK]      ED Course User Index  [AK] Duyen Shea MD       Critical Care Time:   0    Disposition:  Home    PLAN:  1. Discharge Medication List as of 9/5/2019  9:39 PM        2. Follow-up Information     Follow up With Specialties Details Why Contact Info    Greta Sanchez MD Cardiology, [facility] Call in 1 week As needed, If symptoms worsen [phone]           Return to ED if worse     Diagnosis     Clinical Impression:   1. Palpitations        Attestations:    Goyo Post MD    Please note that this dictation was completed with [employer], the [employer] voice recognition software.   Quite often unanticipated grammatical, syntax, homophones, and other interpretive errors are inadvertently transcribed by the computer software. Please disregard these errors. Please excuse any errors that have escaped final proofreading. Thank you.

## 2019-09-06 LAB
ATRIAL RATE: 82 BPM
CALCULATED P AXIS, ECG09: 49 DEGREES
CALCULATED R AXIS, ECG10: -24 DEGREES
CALCULATED T AXIS, ECG11: 42 DEGREES
DIAGNOSIS, 93000: NORMAL
P-R INTERVAL, ECG05: 148 MS
Q-T INTERVAL, ECG07: 384 MS
QRS DURATION, ECG06: 94 MS
QTC CALCULATION (BEZET), ECG08: 448 MS
VENTRICULAR RATE, ECG03: 82 BPM

## 2019-09-06 NOTE — DISCHARGE INSTRUCTIONS
You were evaluated in the emergency department for palpitations. Your examination was reassuring as was your work-up including EKG, chest xray, and blood work. It will be important for you to follow-up with Dr. Hugo Lake in about a week if you continue to have palpitations. If you develop worsening symptoms such as chest pain or shortness of breath, please return to the emergency department immediately.

## 2019-09-06 NOTE — ED NOTES
MD Shira Ramos reviewed discharge instructions with the patient. The patient verbalized understanding. Patient discharged home with stable vitals. Patient out of ED with discharge instructions in hand. Opportunity for questions and clarification provided. No further complaints noted at this time.

## 2019-09-06 NOTE — ED NOTES
Patient presents to the ED ambulatory complaining of palpitations. Patient reports a history of a-fib and palpitations started today around 1630. Patient reports the palpitations comes and goes. Patient denies chest pain, nausea, vomiting, dizziness, or SOB. She is currently prescribed rhymol, metoprolol and eliquis and followed by Elina Porter MD.     Patient placed on the monitor x3 and provided with her call bell.

## 2019-09-21 ENCOUNTER — HOSPITAL ENCOUNTER (EMERGENCY)
Age: 59
Discharge: HOME | End: 2019-09-21
Attending: EMERGENCY MEDICINE
Payer: COMMERCIAL

## 2019-09-21 ENCOUNTER — APPOINTMENT (OUTPATIENT)
Dept: GENERAL RADIOLOGY | Age: 59
End: 2019-09-21
Attending: EMERGENCY MEDICINE
Payer: COMMERCIAL

## 2019-09-21 VITALS
HEIGHT: 66 IN | RESPIRATION RATE: 17 BRPM | WEIGHT: 246.47 LBS | BODY MASS INDEX: 39.61 KG/M2 | SYSTOLIC BLOOD PRESSURE: 111 MMHG | OXYGEN SATURATION: 96 % | DIASTOLIC BLOOD PRESSURE: 64 MMHG | HEART RATE: 65 BPM | TEMPERATURE: 97.8 F

## 2019-09-21 DIAGNOSIS — I48.0 PAF (PAROXYSMAL ATRIAL FIBRILLATION) (HCC): Primary | ICD-10-CM

## 2019-09-21 LAB
ALBUMIN SERPL-MCNC: 3.4 G/DL (ref 3.5–5)
ALBUMIN/GLOB SERPL: 0.9 {RATIO} (ref 1.1–2.2)
ALP SERPL-CCNC: 78 U/L (ref 45–117)
ALT SERPL-CCNC: 20 U/L (ref 12–78)
ANION GAP SERPL CALC-SCNC: 5 MMOL/L (ref 5–15)
AST SERPL-CCNC: 14 U/L (ref 15–37)
BASOPHILS # BLD: 0 K/UL (ref 0–0.1)
BASOPHILS NFR BLD: 0 % (ref 0–1)
BILIRUB SERPL-MCNC: 0.5 MG/DL (ref 0.2–1)
BUN SERPL-MCNC: 12 MG/DL (ref 6–20)
BUN/CREAT SERPL: 13 (ref 12–20)
CALCIUM SERPL-MCNC: 8.6 MG/DL (ref 8.5–10.1)
CHLORIDE SERPL-SCNC: 109 MMOL/L (ref 97–108)
CK SERPL-CCNC: 83 U/L (ref 26–192)
CO2 SERPL-SCNC: 26 MMOL/L (ref 21–32)
CREAT SERPL-MCNC: 0.93 MG/DL (ref 0.55–1.02)
DIFFERENTIAL METHOD BLD: NORMAL
EOSINOPHIL # BLD: 0.1 K/UL (ref 0–0.4)
EOSINOPHIL NFR BLD: 2 % (ref 0–7)
ERYTHROCYTE [DISTWIDTH] IN BLOOD BY AUTOMATED COUNT: 13.7 % (ref 11.5–14.5)
GLOBULIN SER CALC-MCNC: 4 G/DL (ref 2–4)
GLUCOSE SERPL-MCNC: 97 MG/DL (ref 65–100)
HCT VFR BLD AUTO: 46.2 % (ref 35–47)
HGB BLD-MCNC: 15 G/DL (ref 11.5–16)
IMM GRANULOCYTES # BLD AUTO: 0 K/UL (ref 0–0.04)
IMM GRANULOCYTES NFR BLD AUTO: 0 % (ref 0–0.5)
LYMPHOCYTES # BLD: 1.6 K/UL (ref 0.8–3.5)
LYMPHOCYTES NFR BLD: 22 % (ref 12–49)
MCH RBC QN AUTO: 29.6 PG (ref 26–34)
MCHC RBC AUTO-ENTMCNC: 32.5 G/DL (ref 30–36.5)
MCV RBC AUTO: 91.1 FL (ref 80–99)
MONOCYTES # BLD: 0.5 K/UL (ref 0–1)
MONOCYTES NFR BLD: 7 % (ref 5–13)
NEUTS SEG # BLD: 5.2 K/UL (ref 1.8–8)
NEUTS SEG NFR BLD: 69 % (ref 32–75)
NRBC # BLD: 0 K/UL (ref 0–0.01)
NRBC BLD-RTO: 0 PER 100 WBC
PLATELET # BLD AUTO: 289 K/UL (ref 150–400)
PMV BLD AUTO: 9.9 FL (ref 8.9–12.9)
POTASSIUM SERPL-SCNC: 3.8 MMOL/L (ref 3.5–5.1)
PROT SERPL-MCNC: 7.4 G/DL (ref 6.4–8.2)
RBC # BLD AUTO: 5.07 M/UL (ref 3.8–5.2)
SODIUM SERPL-SCNC: 140 MMOL/L (ref 136–145)
TROPONIN I SERPL-MCNC: <0.05 NG/ML
WBC # BLD AUTO: 7.4 K/UL (ref 3.6–11)

## 2019-09-21 PROCEDURE — 84484 ASSAY OF TROPONIN QUANT: CPT

## 2019-09-21 PROCEDURE — 82550 ASSAY OF CK (CPK): CPT

## 2019-09-21 PROCEDURE — 99284 EMERGENCY DEPT VISIT MOD MDM: CPT

## 2019-09-21 PROCEDURE — 85025 COMPLETE CBC W/AUTO DIFF WBC: CPT

## 2019-09-21 PROCEDURE — 36415 COLL VENOUS BLD VENIPUNCTURE: CPT

## 2019-09-21 PROCEDURE — 71045 X-RAY EXAM CHEST 1 VIEW: CPT

## 2019-09-21 PROCEDURE — 93005 ELECTROCARDIOGRAM TRACING: CPT

## 2019-09-21 PROCEDURE — 80053 COMPREHEN METABOLIC PANEL: CPT

## 2019-09-21 NOTE — ED NOTES
Assumed care of pt from triage. Reports began with feelings of an irregular heart beat for 2 weeks ago that occurs occasionally. Today was getting out of the shower and felt an \"irregular beat\" and became diaphoretic. At this time the symptoms have all resolved. A&Ox4. Monitor x3. Dr. Peewee Ko at bedside.

## 2019-09-21 NOTE — ED PROVIDER NOTES
EMERGENCY DEPARTMENT HISTORY AND PHYSICAL EXAM      Date: 9/21/2019  Patient Name: Lorenza Pastrana    Please note that this dictation was completed with [employer], the computer voice recognition software. Quite often unanticipated grammatical, syntax, homophones, and other interpretive errors are inadvertently transcribed by the computer software. Please disregard these errors. Please excuse any errors that have escaped final proofreading. History of Presenting Illness     Chief Complaint   Patient presents with    Irregular Heart Beat     intermittent times two weeks       History Provided By: Patient    HPI: Lorenza Pastrana, 64 y.o. female, with past medical history significant for atrial fibrillation on propafenone, metoprolol, apixaban presenting the emergency department complaining of palpitations, heart racing, diaphoresis. Symptoms started suddenly this morning, feels similar to her prior episodes of A. fib. She states she is been compliant with her medicines. Came in here via private vehicle, symptoms have resolved by the time she arrived. Denies any chest pain or shortness of breath. Denies any unilateral leg pain or leg swelling. She has not followed up with recently with her cardiologist, notes that she is having increasing episodes of A. fib, recently increased her metoprolol from 12.5 to 25 mg daily, no change in the pertinent path unknown dose which is 225 mg twice daily. PCP: None    No current facility-administered medications on file prior to encounter. Current Outpatient Medications on File Prior to Encounter   Medication Sig Dispense Refill    metoprolol succinate (TOPROL-XL) 25 mg XL tablet Take 1 Tab by mouth nightly. Increased 5/2019 90 Tab 3    apixaban (ELIQUIS) 5 mg tablet Take 1 Tab by mouth two (2) times a day. 180 Tab 4    propafenone SR (RYTHMOL SR) 225 mg SR capsule Take 1 Cap by mouth every twelve (12) hours.  180 Cap 4       Past History     Past Medical History:  Past Medical History:   Diagnosis Date    Atrial fibrillation [facility])        Past Surgical History:  Past Surgical History:   Procedure Laterality Date    HX TUBAL LIGATION         Family History:  Family History   Problem Relation Age of Onset    Other Other         no known FH of CAD       Social History:  Social History     Tobacco Use    Smoking status: Current Every Day Smoker     Packs/day: 1.00     Years: 35.00     Pack years: 35.00     Types: Cigarettes    Smokeless tobacco: Never Used   Substance Use Topics    Alcohol use: Yes     Alcohol/week: 0.0 standard drinks     Comment: rare    Drug use: No       Allergies: Allergies   Allergen Reactions    Beef Containing Products Hives    Pork/Porcine Containing Products Hives    Erythromycin Other (comments)    Macrobid [Nitrofurantoin Monohyd/M-Cryst] Hives    Pcn [Penicillins] Hives         Review of Systems   Review of Systems   Constitutional: Positive for diaphoresis and fatigue. Negative for chills and fever. HENT: Negative for congestion and sore throat. Eyes: Negative for visual disturbance. Respiratory: Positive for cough. Negative for shortness of breath. Cardiovascular: Positive for palpitations. Negative for chest pain and leg swelling. Gastrointestinal: Negative for abdominal pain, blood in stool, diarrhea and nausea. Endocrine: Negative for polyuria. Genitourinary: Negative for dysuria, flank pain, vaginal bleeding and vaginal discharge. Musculoskeletal: Negative for myalgias. Skin: Negative for rash. Allergic/Immunologic: Negative for immunocompromised state. Neurological: Negative for weakness and headaches. Psychiatric/Behavioral: Negative for confusion. Physical Exam   Physical Exam   Constitutional: She is oriented to person, place, and time. She appears well-developed and well-nourished. HENT:   Head: Normocephalic and atraumatic.    Moist mucous membranes   Eyes: Pupils are equal, round, and reactive to light. Conjunctivae are normal. Right eye exhibits no discharge. Left eye exhibits no discharge. Neck: Normal range of motion. Neck supple. No tracheal deviation present. Cardiovascular: Normal rate, regular rhythm and normal heart sounds. No murmur heard. Pulmonary/Chest: Effort normal and breath sounds normal. No respiratory distress. She has no wheezes. She has no rales. Abdominal: Soft. Bowel sounds are normal. There is no tenderness. There is no rebound and no guarding. Musculoskeletal: Normal range of motion. She exhibits no edema, tenderness or deformity. Neurological: She is alert and oriented to person, place, and time. Skin: Skin is warm and dry. No rash noted. No erythema. Psychiatric: Her behavior is normal.   Nursing note and vitals reviewed. Diagnostic Study Results     Labs -     Recent Results (from the past 12 hour(s))   CBC WITH AUTOMATED DIFF    Collection Time: 09/21/19  1:23 PM   Result Value Ref Range    WBC 7.4 3.6 - 11.0 K/uL    RBC 5.07 3.80 - 5.20 M/uL    HGB 15.0 11.5 - 16.0 g/dL    HCT 46.2 35.0 - 47.0 %    MCV 91.1 80.0 - 99.0 FL    MCH 29.6 26.0 - 34.0 PG    MCHC 32.5 30.0 - 36.5 g/dL    RDW 13.7 11.5 - 14.5 %    PLATELET 228 909 - 942 K/uL    MPV 9.9 8.9 - 12.9 FL    NRBC 0.0 0  WBC    ABSOLUTE NRBC 0.00 0.00 - 0.01 K/uL    NEUTROPHILS 69 32 - 75 %    LYMPHOCYTES 22 12 - 49 %    MONOCYTES 7 5 - 13 %    EOSINOPHILS 2 0 - 7 %    BASOPHILS 0 0 - 1 %    IMMATURE GRANULOCYTES 0 0.0 - 0.5 %    ABS. NEUTROPHILS 5.2 1.8 - 8.0 K/UL    ABS. LYMPHOCYTES 1.6 0.8 - 3.5 K/UL    ABS. MONOCYTES 0.5 0.0 - 1.0 K/UL    ABS. EOSINOPHILS 0.1 0.0 - 0.4 K/UL    ABS. BASOPHILS 0.0 0.0 - 0.1 K/UL    ABS. IMM.  GRANS. 0.0 0.00 - 0.04 K/UL    DF AUTOMATED     METABOLIC PANEL, COMPREHENSIVE    Collection Time: 09/21/19  1:23 PM   Result Value Ref Range    Sodium 140 136 - 145 mmol/L    Potassium 3.8 3.5 - 5.1 mmol/L    Chloride 109 (H) 97 - 108 mmol/L    CO2 26 21 - 32 mmol/L    Anion gap 5 5 - 15 mmol/L    Glucose 97 65 - 100 mg/dL    BUN 12 6 - 20 MG/DL    Creatinine 0.93 0.55 - 1.02 MG/DL    BUN/Creatinine ratio 13 12 - 20      GFR est AA >60 >60 ml/min/1.73m2    GFR est non-AA >60 >60 ml/min/1.73m2    Calcium 8.6 8.5 - 10.1 MG/DL    Bilirubin, total 0.5 0.2 - 1.0 MG/DL    ALT (SGPT) 20 12 - 78 U/L    AST (SGOT) 14 (L) 15 - 37 U/L    Alk. phosphatase 78 45 - 117 U/L    Protein, total 7.4 6.4 - 8.2 g/dL    Albumin 3.4 (L) 3.5 - 5.0 g/dL    Globulin 4.0 2.0 - 4.0 g/dL    A-G Ratio 0.9 (L) 1.1 - 2.2     CK W/ REFLX CKMB    Collection Time: 09/21/19  1:23 PM   Result Value Ref Range    CK 83 26 - 192 U/L   TROPONIN I    Collection Time: 09/21/19  1:23 PM   Result Value Ref Range    Troponin-I, Qt. <0.05 <0.05 ng/mL       Radiologic Studies -   XR CHEST PORT   Final Result   IMPRESSION: No acute findings. CT Results  (Last 48 hours)    None        CXR Results  (Last 48 hours)               09/21/19 1329  XR CHEST PORT Final result    Impression:  IMPRESSION: No acute findings. Narrative:  EXAM: XR CHEST PORT       INDICATION: palpitations       COMPARISON: September 5       FINDINGS: A portable AP radiograph of the chest was obtained at 1310 hours. The   patient is on a cardiac monitor. The lungs are hyperinflated but appear grossly   clear. The cardiac and mediastinal contours and pulmonary vascularity are   normal.  The bones and soft tissues are grossly within normal limits. Medical Decision Making   I am the first provider for this patient. I reviewed the vital signs, available nursing notes, past medical history, past surgical history, family history and social history. Vital Signs-Reviewed the patient's vital signs. Patient Vitals for the past 12 hrs:   Pulse Resp BP SpO2   09/21/19 1400 65 17 111/64 96 %         EKG interpretation: (Preliminary)  EKG shows sinus rhythm, rate 73. Left axis.   Anterior lateral ST changes unchanged from prior ECG. No evidence of ST segment elevation mi. Interpreted by me    Records Reviewed: Nursing Notes and Old Medical Records    Provider Notes (Medical Decision Making):   Patient here with paroxysmal A. fib. Next episode likely have her increase her per path unknown dose. This should be done in conjunction with cardiology. Would likely go to 325 mg twice daily. I have recommended though the patient in the meantime could take an extra dose of her per path and on should she develop symptoms at home. Patient should return here with any worsening or change in symptoms. Will check labs for inciting factors, most likely this is just a worsening of the patient's paroxysmal A. fib. She plans to follow-up with cardiology on Monday. ED Course:   Initial assessment performed. The patients presenting problems have been discussed, and they are in agreement with the care plan formulated and outlined with them. I have encouraged them to ask questions as they arise throughout their visit. Critical Care Time:   none    Disposition:  DISCHARGE NOTE  Patients results have been reviewed with them. Patient and/or family have verbally conveyed their understanding and agreement of the patient's signs, symptoms, diagnosis, treatment and prognosis and additionally agree to follow up as recommended or return to the Emergency Room should their condition change or have any new concerns prior to their follow-up appointment. Patient verbally agrees with the care-plan and verbally conveys that all of their questions have been answered. Discharge instructions have also been provided to the patient with some educational information regarding their diagnosis as well a list of reasons why they would want to return to the ER prior to their follow-up appointment should their condition change. PLAN:  1. Discharge Medication List as of 9/21/2019  2:06 PM        2.    Follow-up Information     Follow up With Specialties Details Why Contact Info    Dolly Sanabria MD Cardiology, [address] [facility] Vascular Surgery, Internal Medicine Schedule an appointment as soon as possible for a visit  [address]  [address]       [facility] EMERGENCY DEPT Emergency Medicine  If symptoms worsen [address] [facility]  [address] [facility]  [phone]          Return to ED if worse     Diagnosis     Clinical Impression:   1. PAF (paroxysmal atrial fibrillation) ([facility] [address]        Attestations:   This note was completed by Rossi Wells DO

## 2019-09-21 NOTE — ED NOTES
Dr. Celestino Hameed has reviewed discharge instructions with the patient. The patient verbalized understanding. Pt. A&Ox4, respirations even and unlabored. VS stable as noted in flowsheet. Declined wheelchair assist from department; paperwork in hand.

## 2019-09-22 LAB
ATRIAL RATE: 73 BPM
CALCULATED P AXIS, ECG09: 42 DEGREES
CALCULATED R AXIS, ECG10: -27 DEGREES
CALCULATED T AXIS, ECG11: -124 DEGREES
DIAGNOSIS, 93000: NORMAL
P-R INTERVAL, ECG05: 154 MS
Q-T INTERVAL, ECG07: 382 MS
QRS DURATION, ECG06: 90 MS
QTC CALCULATION (BEZET), ECG08: 420 MS
VENTRICULAR RATE, ECG03: 73 BPM

## 2019-10-17 ENCOUNTER — OFFICE VISIT (OUTPATIENT)
Dept: CARDIOLOGY CLINIC | Age: 59
End: 2019-10-17

## 2019-10-17 VITALS
SYSTOLIC BLOOD PRESSURE: 130 MMHG | BODY MASS INDEX: 39.6 KG/M2 | HEART RATE: 70 BPM | RESPIRATION RATE: 16 BRPM | WEIGHT: 246.4 LBS | OXYGEN SATURATION: 97 % | DIASTOLIC BLOOD PRESSURE: 80 MMHG | HEIGHT: 66 IN

## 2019-10-17 DIAGNOSIS — Z72.0 TOBACCO ABUSE: ICD-10-CM

## 2019-10-17 DIAGNOSIS — R79.0 LOW MAGNESIUM LEVEL: ICD-10-CM

## 2019-10-17 DIAGNOSIS — G47.39 OTHER SLEEP APNEA: ICD-10-CM

## 2019-10-17 DIAGNOSIS — I48.0 PAF (PAROXYSMAL ATRIAL FIBRILLATION) (HCC): Primary | ICD-10-CM

## 2019-10-17 PROBLEM — G47.33 OSA ON CPAP: Status: ACTIVE | Noted: 2019-10-17

## 2019-10-17 PROBLEM — Z99.89 OSA ON CPAP: Status: ACTIVE | Noted: 2019-10-17

## 2019-10-17 NOTE — PROGRESS NOTES
[address] [place] [address]  [phone]     Subjective:      Kwan Azar is a 64 y.o. female is here for ER f/u. Recently went to the ER after an episode of suspected atrial fibrillation, but by the time she got back and got an EKG, symptoms had resolved. She previously had documented breakthrough in May 2019. The patient denies chest pain/ shortness of breath, orthopnea, PND, LE edema, palpitations, syncope, or presyncope.        Patient Active Problem List    Diagnosis Date Noted    MAYANK on CPAP 10/17/2019    Atrial fibrillation ([facility] [address] 03/04/2018    Irregular heart beat 12/07/2015    PAF (paroxysmal atrial fibrillation) ([facility] 11/28/2015    Lower urinary tract infectious disease 11/28/2015    Allergic drug rash due to sulfonamide 11/28/2015      None  Past Medical History:   Diagnosis Date    Atrial fibrillation [facility])       Past Surgical History:   Procedure Laterality Date    HX TUBAL LIGATION       Allergies   Allergen Reactions    Beef Containing Products Hives    Pork/Porcine Containing Products Hives    Erythromycin Other (comments)    Macrobid [Nitrofurantoin Monohyd/M-Cryst] Hives    Pcn [Penicillins] Hives      Family History   Problem Relation Age of Onset    Other Other         no known FH of CAD      Social History     Socioeconomic History    Marital status:      Spouse name: Not on file    Number of children: Not on file    Years of education: Not on file    Highest education level: Not on file   Occupational History    Not on file   Social Needs    Financial resource strain: Not on file    Food insecurity:     Worry: Not on file     Inability: Not on file    Transportation needs:     Medical: Not on file     Non-medical: Not on file   Tobacco Use    Smoking status: Current Every Day Smoker     Packs/day: 1.00     Years: 35.00     Pack years: 35.00     Types: Cigarettes    Smokeless tobacco: Never Used   Substance and Sexual Activity    Alcohol use: Yes     Alcohol/week: 0.0 standard drinks     Comment: rare    Drug use: No    Sexual activity: Not Currently   Lifestyle    Physical activity:     Days per week: Not on file     Minutes per session: Not on file    Stress: Not on file   Relationships    Social connections:     Talks on phone: Not on file     Gets together: Not on file     Attends [employer] service: Not on file     Active member of club or organization: Not on file     Attends meetings of clubs or organizations: Not on file     Relationship status: Not on file    Intimate partner violence:     Fear of current or ex partner: Not on file     Emotionally abused: Not on file     Physically abused: Not on file     Forced sexual activity: Not on file   Other Topics Concern    Not on file   Social History Narrative    Not on file      Current Outpatient Medications   Medication Sig    metoprolol succinate (TOPROL-XL) 25 mg XL tablet Take 1 Tab by mouth nightly. Increased 5/2019    apixaban (ELIQUIS) 5 mg tablet Take 1 Tab by mouth two (2) times a day.  propafenone SR (RYTHMOL SR) 225 mg SR capsule Take 1 Cap by mouth every twelve (12) hours. No current facility-administered medications for this visit. Review of Symptoms:  11 systems reviewed, negative other than as stated in the HPI    Physical ExamPhysical Exam:    Vitals:    10/17/19 1044   BP: 130/80   Pulse: 70   Resp: 16   SpO2: 97%   Weight: 246 lb 6.4 oz (111.8 kg)   Height: 5' 6\" (1.676 m)     Body mass index is 39.77 kg/m². General PE  Gen:  NAD  Mental Status - Alert. General Appearance - Not in acute distress. HEENT:  PERRL, no carotid bruits or JVD  Chest and Lung Exam   Inspection: Accessory muscles - No use of accessory muscles in breathing.    Auscultation:   Breath sounds: - Normal.   Cardiovascular   Inspection: Jugular vein - Bilateral - Inspection Normal.   Palpation/Percussion:   Apical Impulse: - Normal.   Auscultation: Rhythm - Regular. Heart Sounds - S1 WNL and S2 WNL. No S3 or S4. Murmurs & Other Heart Sounds: Auscultation of the heart reveals - No Murmurs. Peripheral Vascular   Upper Extremity: Inspection - Bilateral - No Cyanotic nailbeds or Digital clubbing. Lower Extremity:   Palpation: Edema - Bilateral - No edema. Abdomen:   Soft, non-tender, bowel sounds are active. Neuro: A&O times 3, CN and motor grossly WNL    Labs:   Lab Results   Component Value Date/Time    Cholesterol, total 154 02/19/2018 10:13 AM    HDL Cholesterol 49 02/19/2018 10:13 AM    LDL, calculated 91 02/19/2018 10:13 AM    Triglyceride 68 02/19/2018 10:13 AM     Lab Results   Component Value Date/Time    CK 66 02/14/2018 10:06 PM     Lab Results   Component Value Date/Time    Sodium 140 09/21/2019 01:23 PM    Potassium 3.8 09/21/2019 01:23 PM    Chloride 109 (H) 09/21/2019 01:23 PM    CO2 26 09/21/2019 01:23 PM    Anion gap 5 09/21/2019 01:23 PM    Glucose 97 09/21/2019 01:23 PM    BUN 12 09/21/2019 01:23 PM    Creatinine 0.93 09/21/2019 01:23 PM    BUN/Creatinine ratio 13 09/21/2019 01:23 PM    GFR est AA >60 09/21/2019 01:23 PM    GFR est non-AA >60 09/21/2019 01:23 PM    Calcium 8.6 09/21/2019 01:23 PM    Bilirubin, total 0.5 09/21/2019 01:23 PM    AST (SGOT) 14 (L) 09/21/2019 01:23 PM    Alk. phosphatase 78 09/21/2019 01:23 PM    Protein, total 7.4 09/21/2019 01:23 PM    Albumin 3.4 (L) 09/21/2019 01:23 PM    Globulin 4.0 09/21/2019 01:23 PM    A-G Ratio 0.9 (L) 09/21/2019 01:23 PM    ALT (SGPT) 20 09/21/2019 01:23 PM       EKG:  NSR     Assessment:        1. PAF (paroxysmal atrial fibrillation) ([address]    2. Tobacco abuse    3. Low magnesium level    4. Other sleep apnea    5. Body mass index (bmi) 39.0-39.9, adult        Orders Placed This Encounter    AMB POC EKG ROUTINE W/ 12 LEADS, INTER & REP     Order Specific Question:   Reason for Exam:     Answer:   routine        Plan:     Yina Varner is a 64 y.o. female is here for ER f/u.   Recently went to the ER after an episode of suspected atrial fibrillation, but by the time she got back and got an EKG, symptoms had resolved. She previously had documented breakthrough in May 2019. Paroxysmal atrial fibrillation:   Maintain Rythmol 225 mg twice a day, metoprolol XL 25 mg full tablet at nighttime, anticoagulated with Eliquis. Electrolytes and thyroid were okay at last check. Discussed alternative therapy for A. fib treatment including ablation, prefers to maintain medical approach at this time. She understands she needs to get very strict about diet and exercise for weight loss to lessen the chances of atrial fibrillation. Encouraged that if she keeps having breakthrough, she needs to strongly consider ablation. Check echo now to rule out new structural heart disease. MAYANK on CPAP-she states she is compliant     Tobacco abuse  Not addressed today-reassess at next visit. BMI 39:  Discussed exercising at least 30 minutes daily and calorie restricted diet. She has been trying to be better about her diet, but admits that she is eating a good amount of carbs. Goal of 25 pound weight loss in 6 months.     Follow up in 6 months, sooner PRN.      Lyndon Haney MD

## 2019-10-17 NOTE — Clinical Note
10/17/19 Patient: Juarez Gupta YOB: 1960 Date of Visit: 10/17/2019 None None ([id number]) Patient Stated That They Have No Pcp 
VIA Dear None, Thank you for referring Ms. Olga Valiente to [facility] for evaluation. My notes for this consultation are attached. If you have questions, please do not hesitate to call me. I look forward to following your patient along with you.  
 
 
Sincerely, 
 
Tommie Rosado MD

## 2019-10-17 NOTE — PROGRESS NOTES
1. Have you been to the ER, urgent care clinic since your last visit? Hospitalized since your last visit? Yes 9/2019 palpitations    2. Have you seen or consulted any other health care providers outside of the [address] [place] [address] since your last visit? Include any pap smears or colon screening. Dentist 10/2019    Chief Complaint   Patient presents with    Follow-up    Irregular Heart Beat    Patient C/O heart flutters lasting hours at times due to stress at times. She is currently on antibiotics due to dental problems.

## 2019-11-05 ENCOUNTER — TELEPHONE (OUTPATIENT)
Dept: CARDIOLOGY CLINIC | Age: 59
End: 2019-11-05

## 2019-11-05 NOTE — TELEPHONE ENCOUNTER
----- Message from Josette Dodge NP sent at 11/5/2019  8:12 AM EST -----  Nena,    Please call the patient and inform that echocardiogram is normal, ejection fraction 60-65%. No concern for heart failure at this time. She has slight diastolic dysfunction. This occurs when the heart muscle is stiff and cannot relax evenly. Common to see in overweight patients, patients with high blood pressure, and hx of A fib/MAYANK. Treatment is weight loss, BP control, and regular exercise. Valves working appropriately.     Thanks,  [email or web address]

## 2020-06-08 RX ORDER — METOPROLOL SUCCINATE 25 MG/1
25 TABLET, EXTENDED RELEASE ORAL
Qty: 90 TAB | Refills: 3 | Status: SHIPPED | OUTPATIENT
Start: 2020-06-08 | End: 2020-07-10 | Stop reason: SDUPTHER

## 2020-07-10 ENCOUNTER — OFFICE VISIT (OUTPATIENT)
Dept: CARDIOLOGY CLINIC | Age: 60
End: 2020-07-10

## 2020-07-10 VITALS
SYSTOLIC BLOOD PRESSURE: 110 MMHG | OXYGEN SATURATION: 96 % | HEART RATE: 68 BPM | RESPIRATION RATE: 16 BRPM | DIASTOLIC BLOOD PRESSURE: 70 MMHG | HEIGHT: 66 IN | WEIGHT: 240.6 LBS | BODY MASS INDEX: 38.67 KG/M2

## 2020-07-10 DIAGNOSIS — F17.200 CURRENT SMOKER: ICD-10-CM

## 2020-07-10 DIAGNOSIS — Z72.0 TOBACCO ABUSE: ICD-10-CM

## 2020-07-10 DIAGNOSIS — I48.0 PAF (PAROXYSMAL ATRIAL FIBRILLATION) (HCC): Primary | ICD-10-CM

## 2020-07-10 DIAGNOSIS — E66.01 SEVERE OBESITY (HCC): ICD-10-CM

## 2020-07-10 RX ORDER — PROPAFENONE HYDROCHLORIDE 225 MG/1
225 CAPSULE, EXTENDED RELEASE ORAL EVERY 12 HOURS
Qty: 180 CAP | Refills: 4 | Status: SHIPPED | OUTPATIENT
Start: 2020-07-10 | End: 2021-08-12

## 2020-07-10 RX ORDER — METOPROLOL SUCCINATE 25 MG/1
25 TABLET, EXTENDED RELEASE ORAL
Qty: 90 TAB | Refills: 4 | Status: SHIPPED | OUTPATIENT
Start: 2020-07-10 | End: 2021-08-12

## 2020-07-10 NOTE — PROGRESS NOTES
Chief Complaint   Patient presents with    Follow-up    Irregular Heart Beat     1. Have you been to the ER, urgent care clinic since your last visit? Hospitalized since your last visit? 2. Have you seen or consulted any other health care providers outside of the [address] [place] [address] since your last visit? Include any pap smears or colon screening.

## 2020-07-10 NOTE — PROGRESS NOTES
[address] [place] [address]  [phone]     Subjective:      Deshawn iDas is a 64 y.o. female presents for follow up, last seen by us in 10/19 for ED follow up after an episode of suspected AF,  but by the time she got back and got an EKG, symptoms had resolved. She previously had documented breakthrough in May 2019. We obtained echo. Today, she reports feeling well overall. Some issues with anxiety but nothing extreme. She has been working from home,  So kamran sedentary lifestyle for the moment. Denies any cardiac symptoms. The patient denies chest pain/ shortness of breath, orthopnea, PND, LE edema, palpitations, syncope, or presyncope.        Patient Active Problem List    Diagnosis Date Noted    Severe obesity ([facility] [address] [facility] 07/10/2020    MAYANK on CPAP 10/17/2019    Atrial fibrillation ([facility] [address] 03/04/2018    Irregular heart beat 12/07/2015    PAF (paroxysmal atrial fibrillation) ([facility] [address] 11/28/2015    Lower urinary tract infectious disease 11/28/2015    Allergic drug rash due to sulfonamide 11/28/2015      None  Past Medical History:   Diagnosis Date    Atrial fibrillation [facility])       Past Surgical History:   Procedure Laterality Date    HX TUBAL LIGATION       Allergies   Allergen Reactions    Beef Containing Products Hives    Pork/Porcine Containing Products Hives    Erythromycin Other (comments)    Macrobid [Nitrofurantoin Monohyd/M-Cryst] Hives    Pcn [Penicillins] Hives      Family History   Problem Relation Age of Onset    Other Other         no known FH of CAD      Social History     Socioeconomic History    Marital status:      Spouse name: Not on file    Number of children: Not on file    Years of education: Not on file    Highest education level: Not on file   Occupational History    Not on file   Social Needs    Financial resource strain: Not on file    Food insecurity     Worry: Not on file     Inability: Not on file   [place] Transportation needs     Medical: Not on file     Non-medical: Not on file   Tobacco Use    Smoking status: Current Every Day Smoker     Packs/day: 1.00     Years: 35.00     Pack years: 35.00     Types: Cigarettes    Smokeless tobacco: Never Used   Substance and Sexual Activity    Alcohol use: Yes     Alcohol/week: 0.0 standard drinks     Comment: rare    Drug use: No    Sexual activity: Not Currently   Lifestyle    Physical activity     Days per week: Not on file     Minutes per session: Not on file    Stress: Not on file   Relationships    Social connections     Talks on phone: Not on file     Gets together: Not on file     Attends [employer] service: Not on file     Active member of club or organization: Not on file     Attends meetings of clubs or organizations: Not on file     Relationship status: Not on file    Intimate partner violence     Fear of current or ex partner: Not on file     Emotionally abused: Not on file     Physically abused: Not on file     Forced sexual activity: Not on file   Other Topics Concern    Not on file   Social History Narrative    Not on file      Current Outpatient Medications   Medication Sig    propafenone SR (RYTHMOL SR) 225 mg SR capsule TAKE 1 CAP BY MOUTH EVERY TWELVE (12) HOURS.  metoprolol succinate (TOPROL-XL) 25 mg XL tablet TAKE 1 TAB BY MOUTH NIGHTLY. INCREASED 5/2019    Eliquis 5 mg tablet TAKE 1 TABLET BY MOUTH TWICE A DAY     No current facility-administered medications for this visit. Review of Symptoms:  11 systems reviewed, negative other than as stated in the HPI    Physical ExamPhysical Exam:    Vitals:    07/10/20 1141   BP: 110/70   Pulse: 68   Resp: 16   SpO2: 96%   Weight: 240 lb 9.6 oz (109.1 kg)   Height: 5' 6\" (1.676 m)     Body mass index is 38.83 kg/m². General PE  Gen:  NAD  Mental Status - Alert. General Appearance - Not in acute distress.    HEENT:  PERRL, no carotid bruits or JVD  Chest and Lung Exam   Inspection: Accessory muscles - No use of accessory muscles in breathing. Auscultation:   Breath sounds: - Normal.   Cardiovascular   Inspection: Jugular vein - Bilateral - Inspection Normal.   Palpation/Percussion:   Apical Impulse: - Normal.   Auscultation: Rhythm - Regular. Heart Sounds - S1 WNL and S2 WNL. No S3 or S4. Murmurs & Other Heart Sounds: Auscultation of the heart reveals - No Murmurs. Peripheral Vascular   Upper Extremity: Inspection - Bilateral - No Cyanotic nailbeds or Digital clubbing. Lower Extremity:   Palpation: Edema - Bilateral - No edema. Abdomen:   Soft, non-tender, bowel sounds are active. Neuro: A&O times 3, CN and motor grossly WNL    Labs:   Lab Results   Component Value Date/Time    Cholesterol, total 154 02/19/2018 10:13 AM    HDL Cholesterol 49 02/19/2018 10:13 AM    LDL, calculated 91 02/19/2018 10:13 AM    Triglyceride 68 02/19/2018 10:13 AM     Lab Results   Component Value Date/Time    CK 66 02/14/2018 10:06 PM     Lab Results   Component Value Date/Time    Sodium 140 09/21/2019 01:23 PM    Potassium 3.8 09/21/2019 01:23 PM    Chloride 109 (H) 09/21/2019 01:23 PM    CO2 26 09/21/2019 01:23 PM    Anion gap 5 09/21/2019 01:23 PM    Glucose 97 09/21/2019 01:23 PM    BUN 12 09/21/2019 01:23 PM    Creatinine 0.93 09/21/2019 01:23 PM    BUN/Creatinine ratio 13 09/21/2019 01:23 PM    GFR est AA >60 09/21/2019 01:23 PM    GFR est non-AA >60 09/21/2019 01:23 PM    Calcium 8.6 09/21/2019 01:23 PM    Bilirubin, total 0.5 09/21/2019 01:23 PM    Alk. phosphatase 78 09/21/2019 01:23 PM    Protein, total 7.4 09/21/2019 01:23 PM    Albumin 3.4 (L) 09/21/2019 01:23 PM    Globulin 4.0 09/21/2019 01:23 PM    A-G Ratio 0.9 (L) 09/21/2019 01:23 PM    ALT (SGPT) 20 09/21/2019 01:23 PM       EKG:  NSR      Assessment:     Assessment:      1. PAF (paroxysmal atrial fibrillation) ([facility]    2. Severe obesity ([facility] [address]    3. Tobacco abuse    4.  Current smoker        Orders Placed This Encounter    CBC W/O DIFF    METABOLIC PANEL, COMPREHENSIVE    LIPID PANEL    TSH REFLEX TO T4    MAGNESIUM    AMB POC EKG ROUTINE W/ 12 LEADS, INTER & REP     Order Specific Question:   Reason for Exam:     Answer:   routine        Plan:     Patient presents for follow up, last seen by us in 10/19 for ED follow up after an episode of suspected AF,  but by the time she got back and got an EKG, symptoms had resolved. She previously had documented breakthrough in May 2019. We obtained echo. Today, she reports feeling well overall. Some issues with anxiety but nothing extreme. She has been working from home,  So kamran sedentary lifestyle for the moment. Denies any cardiac symptoms.     Paroxysmal atrial fibrillation:   Normal EF no significant valvular pathology per echo in 10/19  Maintain Rythmol 225 mg twice a day, metoprolol XL 25 mg full tablet at nighttime, anticoagulated with Eliquis. MAYANK on CPAP-she states she is compliant    Bp controlled.     Tobacco abuse, 1 PPD  Tobacco cessation provided--5 mins spent  Recommend nicotine lozenges, Chantix once she establish care with a PCP     BMI 39:  Discussed exercising at least 30 minutes daily and calorie restricted diet- LOST 15 LBS AND THEN GAINED 10. Get back to bike and low carb.     Will check labs for now as she has no PCP      Follow up in 6 months, sooner PRN.          Sharla Barr MD

## 2021-07-26 NOTE — PROGRESS NOTES
[address], [place], [address] [facility]  [phone]     Subjective:      Sarah Taylor is a 63 y.o. female presents for follow up. Pmhx PAF, MAYANK and current smoker. Last OV 7/2020: Reported feeling well overall. Some issues with anxiety but nothing extreme. She has been working from home,  So kamran sedentary lifestyle for the moment. Denies any cardiac symptoms. Today, continues to do well. States compliance with medication therapy, no issues. She received Moderna vaccine, did fine. Continues to smoke, 1 PPD. The patient denies chest pain/ shortness of breath, orthopnea, PND, LE edema, palpitations, syncope, or presyncope. Echo 10/19  · Left Ventricle: Normal cavity size and systolic function (ejection fraction normal). Mild concentric hypertrophy. Estimated left ventricular ejection fraction is 61 - 65%. No regional wall motion abnormality noted. Mild (grade 1) left ventricular diastolic dysfunction. Recall 10/19 for ED follow up after an episode of suspected AF,  but by the time she got back and got an EKG, symptoms had resolved. She previously had documented breakthrough in May 2019. We obtained echo per above.      Patient Active Problem List    Diagnosis Date Noted    Severe obesity ([address] 07/10/2020    MAYANK on CPAP 10/17/2019    Atrial fibrillation ([address] 03/04/2018    Irregular heart beat 12/07/2015    PAF (paroxysmal atrial fibrillation) ([facility] [address] 11/28/2015    Lower urinary tract infectious disease 11/28/2015    Allergic drug rash due to sulfonamide 11/28/2015      None  Past Medical History:   Diagnosis Date    Atrial fibrillation [facility])       Past Surgical History:   Procedure Laterality Date    HX TUBAL LIGATION       Allergies   Allergen Reactions    Beef Containing Products Hives    Pork/Porcine Containing Products Hives    Erythromycin Other (comments)    Macrobid [Nitrofurantoin Monohyd/M-Cryst] Hives    Pcn [Penicillins] Hives      Family History   Problem Relation Age of Onset    Other Other         no known FH of CAD      Social History     Socioeconomic History    Marital status:      Spouse name: Not on file    Number of children: Not on file    Years of education: Not on file    Highest education level: Not on file   Occupational History    Not on file   Tobacco Use    Smoking status: Current Every Day Smoker     Packs/day: 1.00     Years: 35.00     Pack years: 35.00     Types: Cigarettes    Smokeless tobacco: Never Used   Substance and Sexual Activity    Alcohol use: Yes     Alcohol/week: 0.0 standard drinks     Comment: rare    Drug use: No    Sexual activity: Not Currently   Other Topics Concern    Not on file   Social History Narrative    Not on file     Social Determinants of Health     Financial Resource Strain:     Difficulty of Paying Living Expenses:    Food Insecurity:     Worried About Running Out of Food in the Last Year:     [address] in the Last Year:    Transportation Needs:     Lack of Transportation (Medical):  Lack of Transportation (Non-Medical):    Physical Activity:     Days of Exercise per Week:     Minutes of Exercise per Session:    Stress:     Feeling of Stress :    Social Connections:     Frequency of Communication with Friends and Family:     Frequency of Social Gatherings with Friends and Family:     Attends [employer] Services:     Active Member of Clubs or Organizations:     Attends Club or Organization Meetings:     Marital Status:    Intimate Partner Violence:     Fear of Current or Ex-Partner:     Emotionally Abused:     Physically Abused:     Sexually Abused:       Current Outpatient Medications   Medication Sig    Eliquis 5 mg tablet TAKE 1 TABLET BY MOUTH TWICE A DAY    propafenone SR (RYTHMOL SR) 225 mg SR capsule Take 1 Cap by mouth every twelve (12) hours.  metoprolol succinate (TOPROL-XL) 25 mg XL tablet Take 1 Tab by mouth nightly.  Increased 5/2019 No current facility-administered medications for this visit. Review of Symptoms:  11 systems reviewed, negative other than as stated in the HPI    Physical ExamPhysical Exam:    There were no vitals filed for this visit. There is no height or weight on file to calculate BMI. General PE  Gen:  NAD  Mental Status - Alert. General Appearance - Not in acute distress. HEENT:  PERRL, no carotid bruits or JVD  Chest and Lung Exam   Inspection: Accessory muscles - No use of accessory muscles in breathing. Auscultation:   Breath sounds: - Normal.   Cardiovascular   Inspection: Jugular vein - Bilateral - Inspection Normal.   Palpation/Percussion:   Apical Impulse: - Normal.   Auscultation: Rhythm - Regular. Heart Sounds - S1 WNL and S2 WNL. No S3 or S4. Murmurs & Other Heart Sounds: Auscultation of the heart reveals - No Murmurs. Peripheral Vascular   Upper Extremity: Inspection - Bilateral - No Cyanotic nailbeds or Digital clubbing. Lower Extremity:   Palpation: Edema - Bilateral - No edema. Abdomen:   Soft, non-tender, bowel sounds are active.   Neuro: A&O times 3, CN and motor grossly WNL    Labs:   Lab Results   Component Value Date/Time    Cholesterol, total 154 02/19/2018 10:13 AM    HDL Cholesterol 49 02/19/2018 10:13 AM    LDL, calculated 91 02/19/2018 10:13 AM    Triglyceride 68 02/19/2018 10:13 AM     Lab Results   Component Value Date/Time    CK 66 02/14/2018 10:06 PM     Lab Results   Component Value Date/Time    Sodium 140 09/21/2019 01:23 PM    Potassium 3.8 09/21/2019 01:23 PM    Chloride 109 (H) 09/21/2019 01:23 PM    CO2 26 09/21/2019 01:23 PM    Anion gap 5 09/21/2019 01:23 PM    Glucose 97 09/21/2019 01:23 PM    BUN 12 09/21/2019 01:23 PM    Creatinine 0.93 09/21/2019 01:23 PM    BUN/Creatinine ratio 13 09/21/2019 01:23 PM    GFR est AA >60 09/21/2019 01:23 PM    GFR est non-AA >60 09/21/2019 01:23 PM    Calcium 8.6 09/21/2019 01:23 PM    Bilirubin, total 0.5 09/21/2019 01:23 PM Alk. phosphatase 78 09/21/2019 01:23 PM    Protein, total 7.4 09/21/2019 01:23 PM    Albumin 3.4 (L) 09/21/2019 01:23 PM    Globulin 4.0 09/21/2019 01:23 PM    A-G Ratio 0.9 (L) 09/21/2019 01:23 PM    ALT (SGPT) 20 09/21/2019 01:23 PM       EKG:  NSR      Assessment:     Assessment:      1. PAF (paroxysmal atrial fibrillation) ([facility] [address]    2. MAYANK on CPAP    3. Irregular heart beat    4. Tobacco abuse    5. Severe obesity ([facility] [address]        No orders of the defined types were placed in this encounter. Plan:        Paroxysmal atrial fibrillation:   Normal EF no significant valvular pathology per echo in 10/19  Maintain Rythmol 225 mg twice a day, metoprolol XL 25 mg full tablet at nighttime, anticoagulated with Eliquis. Checking labs--cbc, cmp, tsh and lipid panel    MAYANK on CPAPshe states she is compliant    Bp controlled.     Tobacco abuse, 1 PPD  Tobacco cessation provided--5 mins spent  Discussed either nicotine lozenges or nicotine patch     BMI 39:  Discussed exercising at least 30 minutes daily and calorie restricted diet  Prev lost wt but gained it back since covid hit.         Follow up in 6 mos         Caitlin Ventura NP

## 2021-08-03 ENCOUNTER — OFFICE VISIT (OUTPATIENT)
Dept: CARDIOLOGY CLINIC | Age: 61
End: 2021-08-03
Payer: COMMERCIAL

## 2021-08-03 VITALS
DIASTOLIC BLOOD PRESSURE: 70 MMHG | OXYGEN SATURATION: 97 % | BODY MASS INDEX: 39.68 KG/M2 | WEIGHT: 246.9 LBS | HEART RATE: 67 BPM | HEIGHT: 66 IN | RESPIRATION RATE: 18 BRPM | SYSTOLIC BLOOD PRESSURE: 120 MMHG

## 2021-08-03 DIAGNOSIS — E66.01 SEVERE OBESITY (HCC): ICD-10-CM

## 2021-08-03 DIAGNOSIS — G47.33 OSA ON CPAP: ICD-10-CM

## 2021-08-03 DIAGNOSIS — Z72.0 TOBACCO ABUSE: ICD-10-CM

## 2021-08-03 DIAGNOSIS — I48.0 PAF (PAROXYSMAL ATRIAL FIBRILLATION) (HCC): Primary | ICD-10-CM

## 2021-08-03 DIAGNOSIS — Z99.89 OSA ON CPAP: ICD-10-CM

## 2021-08-03 DIAGNOSIS — I49.9 IRREGULAR HEART BEAT: ICD-10-CM

## 2021-08-03 PROCEDURE — 99214 OFFICE O/P EST MOD 30 MIN: CPT | Performed by: NURSE PRACTITIONER

## 2021-08-03 PROCEDURE — 93000 ELECTROCARDIOGRAM COMPLETE: CPT | Performed by: NURSE PRACTITIONER

## 2021-08-03 NOTE — PROGRESS NOTES
1. Have you been to the ER, urgent care clinic since your last visit? Hospitalized since your last visit? No    2. Have you seen or consulted any other health care providers outside of the [address] [place] [address] since your last visit? Include any pap smears or colon screening. No    Chief Complaint   Patient presents with    Irregular Heart Beat     Yearly appt. No cardiac concerns.

## 2021-09-16 ENCOUNTER — TELEPHONE (OUTPATIENT)
Dept: CARDIOLOGY CLINIC | Age: 61
End: 2021-09-16

## 2021-09-16 NOTE — TELEPHONE ENCOUNTER
Patient scheduled 9/24/21 for lateral exostosis(UL of 8 teeth  Under local anesthesia  Requesting advise on Eliquis please advise thanks.

## 2021-11-07 RX ORDER — APIXABAN 5 MG/1
TABLET, FILM COATED ORAL
Qty: 180 TABLET | Refills: 1 | Status: SHIPPED | OUTPATIENT
Start: 2021-11-07 | End: 2022-05-15

## 2022-03-07 NOTE — PROGRESS NOTES
[address] [place] [address]  [phone]     Subjective:      Bi Watson is a 63 y.o. female presents for follow up. Pmhx PAF, MAYANK and current smoker. Last OV 8/3/2021:     Today, continues to smoke 1 PPD. Wants to work out but has limiting knee pain. The patient denies chest pain/ shortness of breath, orthopnea, PND, LE edema, palpitations, syncope, or presyncope. Echo 10/19  · Left Ventricle: Normal cavity size and systolic function (ejection fraction normal). Mild concentric hypertrophy. Estimated left ventricular ejection fraction is 61 - 65%. No regional wall motion abnormality noted. Mild (grade 1) left ventricular diastolic dysfunction. Recall 10/19 for ED follow up after an episode of suspected AF,  but by the time she got back and got an EKG, symptoms had resolved. She previously had documented breakthrough in May 2019. We obtained echo per above.      Patient Active Problem List    Diagnosis Date Noted    Severe obesity ([address] 07/10/2020    MAYANK on CPAP 10/17/2019    Atrial fibrillation ([address] 03/04/2018    Irregular heart beat 12/07/2015    PAF (paroxysmal atrial fibrillation) ([address] 11/28/2015    Lower urinary tract infectious disease 11/28/2015    Allergic drug rash due to sulfonamide 11/28/2015      None  Past Medical History:   Diagnosis Date    Atrial fibrillation ([address]     Long term current use of anticoagulant therapy     Eliquis    MAYANK (obstructive sleep apnea)       Past Surgical History:   Procedure Laterality Date    HX TUBAL LIGATION       Allergies   Allergen Reactions    Beef Containing Products Hives    Pork/Porcine Containing Products Hives    Erythromycin Other (comments)    Macrobid [Nitrofurantoin Monohyd/M-Cryst] Hives    Pcn [Penicillins] Hives      Family History   Problem Relation Age of Onset    Other Other         no known FH of CAD    Hypertension Father     Atrial Fibrillation Father       Social History     Socioeconomic History    Marital status:      Spouse name: Not on file    Number of children: Not on file    Years of education: Not on file    Highest education level: Not on file   Occupational History    Not on file   Tobacco Use    Smoking status: Current Every Day Smoker     Packs/day: 1.00     Years: 35.00     Pack years: 35.00     Types: Cigarettes    Smokeless tobacco: Never Used   Substance and Sexual Activity    Alcohol use: Not Currently    Drug use: No    Sexual activity: Not Currently   Other Topics Concern    Not on file   Social History Narrative    Not on file     Social Determinants of Health     Financial Resource Strain:     Difficulty of Paying Living Expenses: Not on file   Food Insecurity:     Worried About Running Out of Food in the Last Year: Not on file    [place] of Food in the Last Year: Not on file   Transportation Needs:     Lack of Transportation (Medical): Not on file    Lack of Transportation (Non-Medical):  Not on file   Physical Activity:     Days of Exercise per Week: Not on file    Minutes of Exercise per Session: Not on file   Stress:     Feeling of Stress : Not on file   Social Connections:     Frequency of Communication with Friends and Family: Not on file    Frequency of Social Gatherings with Friends and Family: Not on file    Attends [employer] Services: Not on file    Active Member of [address] [place] [address] [employer] or Organizations: Not on file    Attends Club or Organization Meetings: Not on file    Marital Status: Not on file   Intimate Partner Violence:     Fear of Current or Ex-Partner: Not on file    Emotionally Abused: Not on file    Physically Abused: Not on file    Sexually Abused: Not on file   Housing Stability:     Unable to Pay for Housing in the Last Year: Not on file    Number of [place] in the Last Year: Not on file    Unstable Housing in the Last Year: Not on file      Current Outpatient Medications   Medication Sig    Eliquis 5 mg tablet TAKE 1 TABLET BY MOUTH TWICE A DAY    propafenone SR (RYTHMOL SR) 225 mg SR capsule TAKE 1 CAPSULE BY MOUTH EVERY 12 HOURS    metoprolol succinate (TOPROL-XL) 25 mg XL tablet TAKE 1 TABLET BY MOUTH EVERY NIGHT     No current facility-administered medications for this visit. Review of Symptoms:  11 systems reviewed, negative other than as stated in the HPI    Physical ExamPhysical Exam:    Vitals:    03/10/22 1321   BP: 120/70   Pulse: 60   Resp: 16   SpO2: 97%   Weight: 244 lb (110.7 kg)   Height: 5' 6\" (1.676 m)     Body mass index is 39.38 kg/m². General PE  Gen:  NAD  Mental Status - Alert. General Appearance - Not in acute distress. HEENT:  PERRL, no carotid bruits or JVD  Chest and Lung Exam   Inspection: Accessory muscles - No use of accessory muscles in breathing. Auscultation:   Breath sounds: - Normal.   Cardiovascular   Inspection: Jugular vein - Bilateral - Inspection Normal.   Palpation/Percussion:   Apical Impulse: - Normal.   Auscultation: Rhythm - Regular. Heart Sounds - S1 WNL and S2 WNL. No S3 or S4. Murmurs & Other Heart Sounds: Auscultation of the heart reveals - No Murmurs. Peripheral Vascular   Upper Extremity: Inspection - Bilateral - No Cyanotic nailbeds or Digital clubbing. Lower Extremity:   Palpation: Edema - Bilateral - No edema. Abdomen:   Soft, non-tender, bowel sounds are active.   Neuro: A&O times 3, CN and motor grossly WNL    Labs:   Lab Results   Component Value Date/Time    Cholesterol, total 154 02/19/2018 10:13 AM    HDL Cholesterol 49 02/19/2018 10:13 AM    LDL, calculated 91 02/19/2018 10:13 AM    Triglyceride 68 02/19/2018 10:13 AM     Lab Results   Component Value Date/Time    CK 66 02/14/2018 10:06 PM     Lab Results   Component Value Date/Time    Sodium 140 09/21/2019 01:23 PM    Potassium 3.8 09/21/2019 01:23 PM    Chloride 109 (H) 09/21/2019 01:23 PM    CO2 26 09/21/2019 01:23 PM    Anion gap 5 09/21/2019 01:23 PM    Glucose 97 09/21/2019 01:23 PM    BUN 12 09/21/2019 01:23 PM    Creatinine 0.93 09/21/2019 01:23 PM    BUN/Creatinine ratio 13 09/21/2019 01:23 PM    GFR est AA >60 09/21/2019 01:23 PM    GFR est non-AA >60 09/21/2019 01:23 PM    Calcium 8.6 09/21/2019 01:23 PM    Bilirubin, total 0.5 09/21/2019 01:23 PM    Alk. phosphatase 78 09/21/2019 01:23 PM    Protein, total 7.4 09/21/2019 01:23 PM    Albumin 3.4 (L) 09/21/2019 01:23 PM    Globulin 4.0 09/21/2019 01:23 PM    A-G Ratio 0.9 (L) 09/21/2019 01:23 PM    ALT (SGPT) 20 09/21/2019 01:23 PM       EKG:  NSR      Assessment:     Assessment:      1. PAF (paroxysmal atrial fibrillation) ([facility]    2. Tobacco abuse    3. MAYANK on CPAP    4. Benign essential HTN        Orders Placed This Encounter    AMB POC EKG ROUTINE W/ 12 LEADS, INTER & REP     Order Specific Question:   Reason for Exam:     Answer:   routine        Plan:        Paroxysmal atrial fibrillation:   Normal EF no significant valvular pathology per echo in 10/19  Maintain Rythmol 225 mg twice a day, metoprolol XL 25 mg full tablet at nighttime, anticoagulated with Eliquis.    Checking labs--cbc, cmp,  and lipid panel; will reorder     MAYANK on CPAPshe states she is compliant    Bp controlled.     Tobacco abuse  Continues to smoke 1 PPD  Discussed either nicotine lozenges or nicotine patch     BMI 39:  Discussed exercising at least 30 minutes daily and calorie restricted diet      Follow up in 1 yr      Pablo Raphael NP

## 2022-03-10 ENCOUNTER — OFFICE VISIT (OUTPATIENT)
Dept: CARDIOLOGY CLINIC | Age: 62
End: 2022-03-10
Payer: COMMERCIAL

## 2022-03-10 VITALS
RESPIRATION RATE: 16 BRPM | OXYGEN SATURATION: 97 % | HEART RATE: 60 BPM | SYSTOLIC BLOOD PRESSURE: 120 MMHG | DIASTOLIC BLOOD PRESSURE: 70 MMHG | WEIGHT: 244 LBS | HEIGHT: 66 IN | BODY MASS INDEX: 39.21 KG/M2

## 2022-03-10 DIAGNOSIS — Z99.89 OSA ON CPAP: ICD-10-CM

## 2022-03-10 DIAGNOSIS — Z72.0 TOBACCO ABUSE: ICD-10-CM

## 2022-03-10 DIAGNOSIS — G47.33 OSA ON CPAP: ICD-10-CM

## 2022-03-10 DIAGNOSIS — I10 BENIGN ESSENTIAL HTN: ICD-10-CM

## 2022-03-10 DIAGNOSIS — I48.0 PAF (PAROXYSMAL ATRIAL FIBRILLATION) (HCC): Primary | ICD-10-CM

## 2022-03-10 PROCEDURE — 93000 ELECTROCARDIOGRAM COMPLETE: CPT | Performed by: NURSE PRACTITIONER

## 2022-03-10 PROCEDURE — 99214 OFFICE O/P EST MOD 30 MIN: CPT | Performed by: NURSE PRACTITIONER

## 2022-03-10 NOTE — PROGRESS NOTES
Chief Complaint   Patient presents with    Follow-up    Irregular Heart Beat     1. Have you been to the ER, urgent care clinic since your last visit? NO  Hospitalized since your last visit? No    2. Have you seen or consulted any other health care providers outside of the [address] [place] [address] since your last visit? Yes Dentist Include any pap smears or colon screening.  NO

## 2022-03-19 PROBLEM — I48.91 ATRIAL FIBRILLATION (HCC): Status: ACTIVE | Noted: 2018-03-04

## 2022-03-19 PROBLEM — Z99.89 OSA ON CPAP: Status: ACTIVE | Noted: 2019-10-17

## 2022-03-19 PROBLEM — E66.01 SEVERE OBESITY (HCC): Status: ACTIVE | Noted: 2020-07-10

## 2022-03-19 PROBLEM — G47.33 OSA ON CPAP: Status: ACTIVE | Noted: 2019-10-17

## 2022-03-23 PROCEDURE — 99285 EMERGENCY DEPT VISIT HI MDM: CPT

## 2022-03-23 PROCEDURE — 96374 THER/PROPH/DIAG INJ IV PUSH: CPT

## 2022-03-24 ENCOUNTER — TELEPHONE (OUTPATIENT)
Dept: CARDIOLOGY CLINIC | Age: 62
End: 2022-03-24

## 2022-03-24 ENCOUNTER — HOSPITAL ENCOUNTER (EMERGENCY)
Age: 62
Discharge: HOME | End: 2022-03-24
Attending: EMERGENCY MEDICINE
Payer: COMMERCIAL

## 2022-03-24 ENCOUNTER — APPOINTMENT (OUTPATIENT)
Dept: GENERAL RADIOLOGY | Age: 62
End: 2022-03-24
Attending: EMERGENCY MEDICINE
Payer: COMMERCIAL

## 2022-03-24 VITALS
OXYGEN SATURATION: 94 % | BODY MASS INDEX: 38.8 KG/M2 | TEMPERATURE: 98.3 F | SYSTOLIC BLOOD PRESSURE: 107 MMHG | RESPIRATION RATE: 11 BRPM | HEART RATE: 70 BPM | HEIGHT: 66 IN | WEIGHT: 241.4 LBS | DIASTOLIC BLOOD PRESSURE: 56 MMHG

## 2022-03-24 DIAGNOSIS — I48.92 ATRIAL FLUTTER WITH RAPID VENTRICULAR RESPONSE (HCC): Primary | ICD-10-CM

## 2022-03-24 LAB
ALBUMIN SERPL-MCNC: 3.5 G/DL (ref 3.5–5)
ALBUMIN/GLOB SERPL: 0.9 {RATIO} (ref 1.1–2.2)
ALP SERPL-CCNC: 90 U/L (ref 45–117)
ALT SERPL-CCNC: 19 U/L (ref 12–78)
ANION GAP SERPL CALC-SCNC: 5 MMOL/L (ref 5–15)
AST SERPL-CCNC: 10 U/L (ref 15–37)
ATRIAL RATE: 250 BPM
ATRIAL RATE: 282 BPM
BASOPHILS # BLD: 0.1 K/UL (ref 0–0.1)
BASOPHILS NFR BLD: 0 % (ref 0–1)
BILIRUB SERPL-MCNC: 0.3 MG/DL (ref 0.2–1)
BNP SERPL-MCNC: 140 PG/ML
BUN SERPL-MCNC: 18 MG/DL (ref 6–20)
BUN/CREAT SERPL: 19 (ref 12–20)
CALCIUM SERPL-MCNC: 9.3 MG/DL (ref 8.5–10.1)
CALCULATED P AXIS, ECG09: -101 DEGREES
CALCULATED R AXIS, ECG10: -34 DEGREES
CALCULATED R AXIS, ECG10: 4 DEGREES
CALCULATED T AXIS, ECG11: -106 DEGREES
CALCULATED T AXIS, ECG11: 140 DEGREES
CHLORIDE SERPL-SCNC: 106 MMOL/L (ref 97–108)
CO2 SERPL-SCNC: 27 MMOL/L (ref 21–32)
CREAT SERPL-MCNC: 0.95 MG/DL (ref 0.55–1.02)
DIAGNOSIS, 93000: NORMAL
DIAGNOSIS, 93000: NORMAL
DIFFERENTIAL METHOD BLD: ABNORMAL
EOSINOPHIL # BLD: 0.2 K/UL (ref 0–0.4)
EOSINOPHIL NFR BLD: 2 % (ref 0–7)
ERYTHROCYTE [DISTWIDTH] IN BLOOD BY AUTOMATED COUNT: 12.7 % (ref 11.5–14.5)
GLOBULIN SER CALC-MCNC: 3.9 G/DL (ref 2–4)
GLUCOSE SERPL-MCNC: 132 MG/DL (ref 65–100)
HCT VFR BLD AUTO: 48.3 % (ref 35–47)
HGB BLD-MCNC: 15.6 G/DL (ref 11.5–16)
IMM GRANULOCYTES # BLD AUTO: 0.1 K/UL (ref 0–0.04)
IMM GRANULOCYTES NFR BLD AUTO: 0 % (ref 0–0.5)
LYMPHOCYTES # BLD: 3.1 K/UL (ref 0.8–3.5)
LYMPHOCYTES NFR BLD: 26 % (ref 12–49)
MAGNESIUM SERPL-MCNC: 2.2 MG/DL (ref 1.6–2.4)
MCH RBC QN AUTO: 31.3 PG (ref 26–34)
MCHC RBC AUTO-ENTMCNC: 32.3 G/DL (ref 30–36.5)
MCV RBC AUTO: 96.8 FL (ref 80–99)
MONOCYTES # BLD: 1 K/UL (ref 0–1)
MONOCYTES NFR BLD: 8 % (ref 5–13)
NEUTS SEG # BLD: 7.5 K/UL (ref 1.8–8)
NEUTS SEG NFR BLD: 64 % (ref 32–75)
NRBC # BLD: 0 K/UL (ref 0–0.01)
NRBC BLD-RTO: 0 PER 100 WBC
PLATELET # BLD AUTO: 283 K/UL (ref 150–400)
PMV BLD AUTO: 10.2 FL (ref 8.9–12.9)
POTASSIUM SERPL-SCNC: 3.8 MMOL/L (ref 3.5–5.1)
PROT SERPL-MCNC: 7.4 G/DL (ref 6.4–8.2)
Q-T INTERVAL, ECG07: 260 MS
Q-T INTERVAL, ECG07: 308 MS
QRS DURATION, ECG06: 80 MS
QRS DURATION, ECG06: 86 MS
QTC CALCULATION (BEZET), ECG08: 375 MS
QTC CALCULATION (BEZET), ECG08: 471 MS
RBC # BLD AUTO: 4.99 M/UL (ref 3.8–5.2)
SODIUM SERPL-SCNC: 138 MMOL/L (ref 136–145)
TROPONIN-HIGH SENSITIVITY: 5 NG/L (ref 0–51)
VENTRICULAR RATE, ECG03: 125 BPM
VENTRICULAR RATE, ECG03: 141 BPM
WBC # BLD AUTO: 11.8 K/UL (ref 3.6–11)

## 2022-03-24 PROCEDURE — 80053 COMPREHEN METABOLIC PANEL: CPT

## 2022-03-24 PROCEDURE — 85025 COMPLETE CBC W/AUTO DIFF WBC: CPT

## 2022-03-24 PROCEDURE — 71045 X-RAY EXAM CHEST 1 VIEW: CPT

## 2022-03-24 PROCEDURE — 74011250636 HC RX REV CODE- 250/636: Performed by: EMERGENCY MEDICINE

## 2022-03-24 PROCEDURE — 83880 ASSAY OF NATRIURETIC PEPTIDE: CPT

## 2022-03-24 PROCEDURE — 74011000250 HC RX REV CODE- 250: Performed by: EMERGENCY MEDICINE

## 2022-03-24 PROCEDURE — 36415 COLL VENOUS BLD VENIPUNCTURE: CPT

## 2022-03-24 PROCEDURE — 84484 ASSAY OF TROPONIN QUANT: CPT

## 2022-03-24 PROCEDURE — 93005 ELECTROCARDIOGRAM TRACING: CPT

## 2022-03-24 PROCEDURE — 83735 ASSAY OF MAGNESIUM: CPT

## 2022-03-24 RX ORDER — DILTIAZEM HYDROCHLORIDE 5 MG/ML
15 INJECTION INTRAVENOUS
Status: DISCONTINUED | OUTPATIENT
Start: 2022-03-24 | End: 2022-03-24

## 2022-03-24 RX ORDER — METOPROLOL TARTRATE 5 MG/5ML
5 INJECTION INTRAVENOUS
Status: COMPLETED | OUTPATIENT
Start: 2022-03-24 | End: 2022-03-24

## 2022-03-24 RX ADMIN — METOPROLOL TARTRATE 5 MG: 5 INJECTION, SOLUTION INTRAVENOUS at 02:15

## 2022-03-24 RX ADMIN — SODIUM CHLORIDE 500 ML: 9 INJECTION, SOLUTION INTRAVENOUS at 00:45

## 2022-03-24 RX ADMIN — METOPROLOL TARTRATE 5 MG: 5 INJECTION, SOLUTION INTRAVENOUS at 02:25

## 2022-03-24 RX ADMIN — METOPROLOL TARTRATE 5 MG: 5 INJECTION, SOLUTION INTRAVENOUS at 02:22

## 2022-03-24 NOTE — ED TRIAGE NOTES
Patient presents to triage ambulatory complaining of palpations. Patient reports a history of A-fib & reports having a rhythm change starting yesterday. Patient reports waking up around 2100 with a heart rate in the 140s.  Patient's cardiologist is Justin Francisco MD.

## 2022-03-24 NOTE — ED PROVIDER NOTES
EMERGENCY DEPARTMENT HISTORY AND PHYSICAL EXAM      Date: 3/24/2022  Patient Name: Elizabeth Khan    History of Presenting Illness     Chief Complaint   Patient presents with    Palpitations     Patient presents to triage ambulatory complaining of palpations. Patient reports a history of A-fib & reports having a rhythm change starting yesterday. Patient reports waking up around 2100 with a heart rate in the 140s. Patient's cardiologist is Jolene Jean MD.        History Provided By: Patient    HPI: Elizabeth Khan, 63 y.o. female with PMHx as noted below presents the emergency department with chief complaint of palpitations. Patient states has been experiencing intermittent palpitations over the last several days however this evening became more constant. Symptoms been constant, moderate in intensity without relieving or exacerbating factors. Patient denying any pain at this time. There is been no recent illness. Patient compliant with her home medications. Pt denies any other alleviating or exacerbating factors. Additionally, pt specifically denies any recent fever, chills, headache, nausea, vomiting, abdominal pain, CP, SOB, lightheadedness, dizziness, numbness, weakness, BLE swelling, urinary sxs, diarrhea, constipation, melena, hematochezia, cough, or congestion.   PCP: None    Current Outpatient Medications   Medication Sig Dispense Refill    Eliquis 5 mg tablet TAKE 1 TABLET BY MOUTH TWICE A  Tablet 1    propafenone SR (RYTHMOL SR) 225 mg SR capsule TAKE 1 CAPSULE BY MOUTH EVERY 12 HOURS 180 Capsule 3    metoprolol succinate (TOPROL-XL) 25 mg XL tablet TAKE 1 TABLET BY MOUTH EVERY NIGHT 90 Tablet 3       Past History     Past Medical History:  Past Medical History:   Diagnosis Date    Atrial fibrillation ([address]     Long term current use of anticoagulant therapy     Eliquis    MAYANK (obstructive sleep apnea)        Past Surgical History:  Past Surgical History:   Procedure Laterality Date    HX TUBAL LIGATION         Family History:  Family History   Problem Relation Age of Onset    Other Other         no known FH of CAD    Hypertension Father     Atrial Fibrillation Father        Social History:  Social History     Tobacco Use    Smoking status: Current Every Day Smoker     Packs/day: 1.00     Years: 35.00     Pack years: 35.00     Types: Cigarettes    Smokeless tobacco: Never Used   Substance Use Topics    Alcohol use: Not Currently    Drug use: No       Allergies: Allergies   Allergen Reactions    Beef Containing Products Hives    Pork/Porcine Containing Products Hives    Erythromycin Other (comments)    Macrobid [Nitrofurantoin Monohyd/M-Cryst] Hives    Pcn [Penicillins] Hives         Review of Systems   Review of Systems  Constitutional: Negative for fever, chills, and fatigue. HENT: Negative for congestion, sore throat, rhinorrhea, sneezing and neck stiffness   Eyes: Negative for discharge and redness. Respiratory: Negative for  shortness of breath, wheezing   Cardiovascular: Negative for chest pain. Positive palpitations   Gastrointestinal: Negative for nausea, vomiting, abdominal pain, constipation, diarrhea and blood in stool. Genitourinary: Negative for dysuria, urgency, frequency, hematuria, flank pain, decreased urine volume, discharge,   Musculoskeletal: Negative for myalgias or joint pain . Skin: Negative for rash or lesions . Neurological: Negative weakness, light-headedness, numbness and headaches. Physical Exam   Physical Exam    GENERAL: alert and oriented, no acute distress  EYES: PEERL, No injection, discharge or icterus. ENT: Mucous membranes pink and moist.  NECK: Supple  LUNGS: Airway patent. Non-labored respirations. Breath sounds clear with good air entry bilaterally. HEART: Tachycardic, irregular  rhythm. No peripheral edema  ABDOMEN: Non-distended and non-tender, without guarding or rebound.   SKIN:  warm, dry  EXTREMITIES: Without swelling, tenderness or deformity, symmetric with normal ROM  NEUROLOGICAL: Alert, oriented      Diagnostic Study Results     Labs -     Recent Results (from the past 12 hour(s))   EKG, 12 LEAD, INITIAL    Collection Time: 03/24/22 12:03 AM   Result Value Ref Range    Ventricular Rate 141 BPM    Atrial Rate 282 BPM    QRS Duration 80 ms    Q-T Interval 308 ms    QTC Calculation (Bezet) 471 ms    Calculated R Axis 4 degrees    Calculated T Axis 140 degrees    Diagnosis       Atrial flutter with variable AV block  Septal infarct , age undetermined  When compared with ECG of 21-SEP-2019 12:03,  Atrial flutter has replaced Sinus rhythm  Vent. rate has increased BY  68 BPM  Septal infarct is now present  Nonspecific T wave abnormality no longer evident in Inferior leads  T wave inversion no longer evident in Lateral leads     CBC WITH AUTOMATED DIFF    Collection Time: 03/24/22 12:05 AM   Result Value Ref Range    WBC 11.8 (H) 3.6 - 11.0 K/uL    RBC 4.99 3.80 - 5.20 M/uL    HGB 15.6 11.5 - 16.0 g/dL    HCT 48.3 (H) 35.0 - 47.0 %    MCV 96.8 80.0 - 99.0 FL    MCH 31.3 26.0 - 34.0 PG    MCHC 32.3 30.0 - 36.5 g/dL    RDW 12.7 11.5 - 14.5 %    PLATELET 753 016 - 169 K/uL    MPV 10.2 8.9 - 12.9 FL    NRBC 0.0 0  WBC    ABSOLUTE NRBC 0.00 0.00 - 0.01 K/uL    NEUTROPHILS 64 32 - 75 %    LYMPHOCYTES 26 12 - 49 %    MONOCYTES 8 5 - 13 %    EOSINOPHILS 2 0 - 7 %    BASOPHILS 0 0 - 1 %    IMMATURE GRANULOCYTES 0 0.0 - 0.5 %    ABS. NEUTROPHILS 7.5 1.8 - 8.0 K/UL    ABS. LYMPHOCYTES 3.1 0.8 - 3.5 K/UL    ABS. MONOCYTES 1.0 0.0 - 1.0 K/UL    ABS. EOSINOPHILS 0.2 0.0 - 0.4 K/UL    ABS. BASOPHILS 0.1 0.0 - 0.1 K/UL    ABS. IMM.  GRANS. 0.1 (H) 0.00 - 0.04 K/UL    DF AUTOMATED     METABOLIC PANEL, COMPREHENSIVE    Collection Time: 03/24/22 12:05 AM   Result Value Ref Range    Sodium 138 136 - 145 mmol/L    Potassium 3.8 3.5 - 5.1 mmol/L    Chloride 106 97 - 108 mmol/L    CO2 27 21 - 32 mmol/L    Anion gap 5 5 - 15 mmol/L    Glucose 132 (H) 65 - 100 mg/dL    BUN 18 6 - 20 MG/DL    Creatinine 0.95 0.55 - 1.02 MG/DL    BUN/Creatinine ratio 19 12 - 20      GFR est AA >60 >60 ml/min/1.73m2    GFR est non-AA >60 >60 ml/min/1.73m2    Calcium 9.3 8.5 - 10.1 MG/DL    Bilirubin, total 0.3 0.2 - 1.0 MG/DL    ALT (SGPT) 19 12 - 78 U/L    AST (SGOT) 10 (L) 15 - 37 U/L    Alk. phosphatase 90 45 - 117 U/L    Protein, total 7.4 6.4 - 8.2 g/dL    Albumin 3.5 3.5 - 5.0 g/dL    Globulin 3.9 2.0 - 4.0 g/dL    A-G Ratio 0.9 (L) 1.1 - 2.2     NT-PRO BNP    Collection Time: 03/24/22 12:05 AM   Result Value Ref Range    NT pro- (H) <125 PG/ML   TROPONIN-HIGH SENSITIVITY    Collection Time: 03/24/22 12:05 AM   Result Value Ref Range    Troponin-High Sensitivity 5 0 - 51 ng/L   MAGNESIUM    Collection Time: 03/24/22 12:05 AM   Result Value Ref Range    Magnesium 2.2 1.6 - 2.4 mg/dL   EKG, 12 LEAD, SUBSEQUENT    Collection Time: 03/24/22  1:54 AM   Result Value Ref Range    Ventricular Rate 125 BPM    Atrial Rate 250 BPM    QRS Duration 86 ms    Q-T Interval 260 ms    QTC Calculation (Bezet) 375 ms    Calculated P Axis -101 degrees    Calculated R Axis -34 degrees    Calculated T Axis -106 degrees    Diagnosis       Atrial flutter with variable AV block with premature ventricular or   aberrantly conducted complexes  Left axis deviation  Marked ST abnormality, possible inferior subendocardial injury  When compared with ECG of 24-MAR-2022 00:03,  MANUAL COMPARISON REQUIRED, DATA IS UNCONFIRMED         Radiologic Studies -   XR CHEST PORT   Final Result   No evidence of acute cardiopulmonary process. CT Results  (Last 48 hours)    None        CXR Results  (Last 48 hours)               03/24/22 0127  XR CHEST PORT Final result    Impression:  No evidence of acute cardiopulmonary process.            Narrative:  INDICATION: Chest pain       COMPARISON: 9/21/2019       FINDINGS: AP portable imaging of the chest performed at 1:17 AM demonstrates a   stable cardiomediastinal silhouette. The lungs are clear bilaterally. No   significant osseous abnormalities are seen. Medical Decision Making     I, Le Quiles MD am the first provider for this patient and am the attending of record for this patient encounter. I reviewed the vital signs, available nursing notes, past medical history, past surgical history, family history and social history. Vital Signs-Reviewed the patient's vital signs.   Patient Vitals for the past 12 hrs:   Temp Pulse Resp BP SpO2   03/24/22 0335  70 11  94 %   03/24/22 0330  73 14 (!) 107/56 96 %   03/24/22 0325  72 16  98 %   03/24/22 0320  69 13  96 %   03/24/22 0315  (!) 123 20 115/72 95 %   03/24/22 0310  (!) 122 13  95 %   03/24/22 0305  (!) 124 13  95 %   03/24/22 0300  (!) 124 20 120/73 96 %   03/24/22 0255  (!) 123 17  96 %   03/24/22 0250  (!) 124 12  97 %   03/24/22 0245  (!) 125 13 125/65 94 %   03/24/22 0240  (!) 124 11  96 %   03/24/22 0235  (!) 123 15  95 %   03/24/22 0230  (!) 124 11 120/67 97 %   03/24/22 0225  (!) 126 18 116/69 98 %   03/24/22 0222  (!) 125  115/65    03/24/22 0220  (!) 124 24  97 %   03/24/22 0215  (!) 133 17 118/69 98 %   03/24/22 0205  (!) 126 12  97 %   03/24/22 0200  (!) 126 16  96 %   03/24/22 0155  (!) 125 18  97 %   03/24/22 0150  92 13  97 %   03/24/22 0145  (!) 126 14  98 %   03/24/22 0140  (!) 126 13  96 %   03/24/22 0135  (!) 126 24  96 %   03/24/22 0130  (!) 125 11  95 %   03/24/22 0125  (!) 124 14  96 %   03/24/22 0120  (!) 136 15  97 %   03/24/22 0115  (!) 130 13  94 %   03/24/22 0110  (!) 132 12  95 %   03/24/22 0105  (!) 140 11  95 %   03/24/22 0100  100 15  95 %   03/24/22 0055  (!) 140 20  97 %   03/24/22 0050  (!) 124 20  96 %   03/24/22 0040  (!) 131 14 123/77 97 %   03/24/22 0035  (!) 122 14  94 %   03/24/22 0030  (!) 123 13  95 %   03/24/22 0025  (!) 142 13  96 %   03/24/22 0020  (!) 143 12  96 % 03/24/22 0015  (!) 122 15  96 %   03/23/22 2355 98.3 °F (36.8 °C) (!) 151 18 (!) 146/92 100 %       EKG interpretation: (Preliminary)  Rhythm atrial flutter with variable AV block. Rate (approx.): 141; Axis: normal; P wave: normal; QRS interval: normal ; ST/T wave nonspecific changes was interpreted by Michael Rondon MD,ED Provider. Records Reviewed: Nursing Notes and Old Medical Records    Provider Notes (Medical Decision Making): On presentation, the patient is well appearing, in no acute distress noted to be in rapid a flutter on arrival.  Differential included cardiac arrhythmia, PE, thyroid dysfunction, metabolic abnormality, electrolyte abnormality, ACS. Patient with longstanding history of a flutter, compliant with home medications. Basic labs obtained were reassuring. Patient was given IV metoprolol x3 with eventual return to normal sinus rhythm. On reevaluation patient entirely asymptomatic, no recurrence of the arrhythmia is felt stable for discharge and follow-up with her cardiologist as an outpatient. Patient will return if she has any recurrence of her symptoms. ED Course:   Initial assessment performed. The patients presenting problems have been discussed, and they are in agreement with the care plan formulated and outlined with them. I have encouraged them to ask questions as they arise throughout their visit. Medications   metoprolol (LOPRESSOR) injection 5 mg (5 mg IntraVENous Given 3/24/22 0425)   sodium chloride 0.9 % bolus infusion 500 mL (0 mL IntraVENous IV Completed 3/24/22 8593)         [facility]  [facility] been [facility] with her. She has been counseled regarding her diagnosis. She verbally conveys understanding and agreement of the signs, symptoms, diagnosis, treatment and prognosis and additionally agrees to follow up as recommended. She also agrees with the care-plan and conveys that all of her questions have been answered.   I have also put together some discharge instructions for her that include: 1) educational information regarding their diagnosis, 2) how to care for their diagnosis at home, as well a 3) list of reasons why they would want to return to the ED prior to their follow-up appointment, should their condition change. Critical Care Note      IMPENDING DETERIORATION -Cardiovascular  ASSOCIATED RISK FACTORS - Dysrhythmia  MANAGEMENT- Bedside Assessment and Supervision of Care  INTERPRETATION -  ECG and Blood Pressure  INTERVENTIONS -IV metoprolol x3  CASE REVIEW - Nursing  TREATMENT RESPONSE -Improved  PERFORMED BY - Self    NOTES   :  I have provided a total of 40 minutes of critical time not including time spent on separately documented procedures. This care involved high complexity decision making to assess, manipulate, and support vital system functions,  lab review, bedside attention and reassessments and documentation. During this entire length of time I was immediately available to the patient      Disposition:  Discharge    PLAN:  1. Discharge with outpatient cardiology follow-up    Diagnosis     Clinical Impression:   1. Atrial flutter with rapid ventricular response ([address]        Please note that this dictation was completed with Dragon, computer voice recognition software. Quite often unanticipated grammatical, syntax, homophones, and other interpretive errors are inadvertently transcribed by the computer software. Please disregard these errors. Additionally, please excuse any errors that have escaped final proofreading.

## 2022-03-24 NOTE — ED NOTES
Pt reports she dropped her phone and leaned over to pick it up and felt her heart rhythm return to normal.

## 2022-03-24 NOTE — TELEPHONE ENCOUNTER
Pt had an irregular hr and was seen in the hospital last night. She would like to speak with someone to see how to regulate it and next steps moving forward.      Callback is  [phone]    Thanks  Anna Marie Nevarez

## 2022-03-25 ENCOUNTER — CLINICAL SUPPORT (OUTPATIENT)
Dept: CARDIOLOGY CLINIC | Age: 62
End: 2022-03-25

## 2022-03-25 ENCOUNTER — TELEPHONE (OUTPATIENT)
Dept: CARDIOLOGY CLINIC | Age: 62
End: 2022-03-25

## 2022-03-25 DIAGNOSIS — I48.0 PAF (PAROXYSMAL ATRIAL FIBRILLATION) (HCC): Primary | ICD-10-CM

## 2022-03-25 NOTE — TELEPHONE ENCOUNTER
Pt called, stating she was in the ER Wednesday night into Thursday morning with Afib. Pt states her heart went back to normal rhythm while she was there and she was discharged on Metoprolol, Rythmol and Eliquis . Appt with Dr. Khloe Looney is scheduled for 4/21. Today pt states her Apple watch is saying her heart rate is as high as 192 and as low as 42. She states she is lightheaded and worried. Advised pt to come in this afternoon to get a 2 week heart monitor and if she continues to have heart rates over 145 and is symptomatic to go to the ER. Pt verbalized understanding.

## 2022-04-04 ENCOUNTER — HOSPITAL ENCOUNTER (OUTPATIENT)
Age: 62
Setting detail: OBSERVATION
Discharge: HOME | DRG: 310 | End: 2022-04-05
Attending: EMERGENCY MEDICINE | Admitting: INTERNAL MEDICINE
Payer: COMMERCIAL

## 2022-04-04 DIAGNOSIS — I48.91 ATRIAL FIBRILLATION WITH RVR (HCC): Primary | ICD-10-CM

## 2022-04-04 LAB
ALBUMIN SERPL-MCNC: 3.7 G/DL (ref 3.5–5)
ALBUMIN/GLOB SERPL: 0.9 {RATIO} (ref 1.1–2.2)
ALP SERPL-CCNC: 93 U/L (ref 45–117)
ALT SERPL-CCNC: 26 U/L (ref 12–78)
ANION GAP SERPL CALC-SCNC: 4 MMOL/L (ref 5–15)
AST SERPL-CCNC: 16 U/L (ref 15–37)
BASOPHILS # BLD: 0 K/UL (ref 0–0.1)
BASOPHILS NFR BLD: 0 % (ref 0–1)
BILIRUB SERPL-MCNC: 0.4 MG/DL (ref 0.2–1)
BUN SERPL-MCNC: 18 MG/DL (ref 6–20)
BUN/CREAT SERPL: 17 (ref 12–20)
CALCIUM SERPL-MCNC: 9.6 MG/DL (ref 8.5–10.1)
CHLORIDE SERPL-SCNC: 104 MMOL/L (ref 97–108)
CO2 SERPL-SCNC: 30 MMOL/L (ref 21–32)
CREAT SERPL-MCNC: 1.07 MG/DL (ref 0.55–1.02)
DIFFERENTIAL METHOD BLD: ABNORMAL
EOSINOPHIL # BLD: 0.2 K/UL (ref 0–0.4)
EOSINOPHIL NFR BLD: 2 % (ref 0–7)
ERYTHROCYTE [DISTWIDTH] IN BLOOD BY AUTOMATED COUNT: 12.7 % (ref 11.5–14.5)
GLOBULIN SER CALC-MCNC: 4.1 G/DL (ref 2–4)
GLUCOSE SERPL-MCNC: 141 MG/DL (ref 65–100)
HCT VFR BLD AUTO: 49.8 % (ref 35–47)
HGB BLD-MCNC: 16.4 G/DL (ref 11.5–16)
IMM GRANULOCYTES # BLD AUTO: 0.1 K/UL (ref 0–0.04)
IMM GRANULOCYTES NFR BLD AUTO: 1 % (ref 0–0.5)
LYMPHOCYTES # BLD: 2.6 K/UL (ref 0.8–3.5)
LYMPHOCYTES NFR BLD: 23 % (ref 12–49)
MCH RBC QN AUTO: 31.2 PG (ref 26–34)
MCHC RBC AUTO-ENTMCNC: 32.9 G/DL (ref 30–36.5)
MCV RBC AUTO: 94.9 FL (ref 80–99)
MONOCYTES # BLD: 0.7 K/UL (ref 0–1)
MONOCYTES NFR BLD: 6 % (ref 5–13)
NEUTS SEG # BLD: 7.4 K/UL (ref 1.8–8)
NEUTS SEG NFR BLD: 68 % (ref 32–75)
NRBC # BLD: 0 K/UL (ref 0–0.01)
NRBC BLD-RTO: 0 PER 100 WBC
PLATELET # BLD AUTO: 256 K/UL (ref 150–400)
PMV BLD AUTO: 10.1 FL (ref 8.9–12.9)
POTASSIUM SERPL-SCNC: 3.8 MMOL/L (ref 3.5–5.1)
PROT SERPL-MCNC: 7.8 G/DL (ref 6.4–8.2)
RBC # BLD AUTO: 5.25 M/UL (ref 3.8–5.2)
SODIUM SERPL-SCNC: 138 MMOL/L (ref 136–145)
TROPONIN-HIGH SENSITIVITY: 6 NG/L (ref 0–51)
WBC # BLD AUTO: 11 K/UL (ref 3.6–11)

## 2022-04-04 PROCEDURE — 99285 EMERGENCY DEPT VISIT HI MDM: CPT

## 2022-04-04 PROCEDURE — 65660000000 HC RM CCU STEPDOWN

## 2022-04-04 PROCEDURE — 74011250636 HC RX REV CODE- 250/636: Performed by: EMERGENCY MEDICINE

## 2022-04-04 PROCEDURE — 96376 TX/PRO/DX INJ SAME DRUG ADON: CPT

## 2022-04-04 PROCEDURE — 99223 1ST HOSP IP/OBS HIGH 75: CPT | Performed by: INTERNAL MEDICINE

## 2022-04-04 PROCEDURE — 96361 HYDRATE IV INFUSION ADD-ON: CPT

## 2022-04-04 PROCEDURE — G0378 HOSPITAL OBSERVATION PER HR: HCPCS

## 2022-04-04 PROCEDURE — 74011000250 HC RX REV CODE- 250: Performed by: EMERGENCY MEDICINE

## 2022-04-04 PROCEDURE — 36415 COLL VENOUS BLD VENIPUNCTURE: CPT

## 2022-04-04 PROCEDURE — 96374 THER/PROPH/DIAG INJ IV PUSH: CPT

## 2022-04-04 PROCEDURE — 85025 COMPLETE CBC W/AUTO DIFF WBC: CPT

## 2022-04-04 PROCEDURE — 74011000258 HC RX REV CODE- 258: Performed by: EMERGENCY MEDICINE

## 2022-04-04 PROCEDURE — 80053 COMPREHEN METABOLIC PANEL: CPT

## 2022-04-04 PROCEDURE — 74011000250 HC RX REV CODE- 250: Performed by: INTERNAL MEDICINE

## 2022-04-04 PROCEDURE — 93005 ELECTROCARDIOGRAM TRACING: CPT

## 2022-04-04 PROCEDURE — 84484 ASSAY OF TROPONIN QUANT: CPT

## 2022-04-04 PROCEDURE — 96375 TX/PRO/DX INJ NEW DRUG ADDON: CPT

## 2022-04-04 PROCEDURE — 96365 THER/PROPH/DIAG IV INF INIT: CPT

## 2022-04-04 RX ORDER — ACETAMINOPHEN 650 MG/1
650 SUPPOSITORY RECTAL
Status: DISCONTINUED | OUTPATIENT
Start: 2022-04-04 | End: 2022-04-05 | Stop reason: HOSPADM

## 2022-04-04 RX ORDER — SODIUM CHLORIDE 0.9 % (FLUSH) 0.9 %
5-40 SYRINGE (ML) INJECTION AS NEEDED
Status: DISCONTINUED | OUTPATIENT
Start: 2022-04-04 | End: 2022-04-05 | Stop reason: HOSPADM

## 2022-04-04 RX ORDER — DILTIAZEM HYDROCHLORIDE 5 MG/ML
5 INJECTION INTRAVENOUS
Status: COMPLETED | OUTPATIENT
Start: 2022-04-04 | End: 2022-04-04

## 2022-04-04 RX ORDER — METOPROLOL TARTRATE 5 MG/5ML
2.5 INJECTION INTRAVENOUS
Status: COMPLETED | OUTPATIENT
Start: 2022-04-04 | End: 2022-04-04

## 2022-04-04 RX ORDER — SODIUM CHLORIDE 0.9 % (FLUSH) 0.9 %
5-40 SYRINGE (ML) INJECTION EVERY 8 HOURS
Status: DISCONTINUED | OUTPATIENT
Start: 2022-04-04 | End: 2022-04-05 | Stop reason: HOSPADM

## 2022-04-04 RX ORDER — ONDANSETRON 4 MG/1
4 TABLET, ORALLY DISINTEGRATING ORAL
Status: DISCONTINUED | OUTPATIENT
Start: 2022-04-04 | End: 2022-04-05 | Stop reason: HOSPADM

## 2022-04-04 RX ORDER — ACETAMINOPHEN 325 MG/1
650 TABLET ORAL
Status: DISCONTINUED | OUTPATIENT
Start: 2022-04-04 | End: 2022-04-05 | Stop reason: HOSPADM

## 2022-04-04 RX ORDER — POLYETHYLENE GLYCOL 3350 17 G/17G
17 POWDER, FOR SOLUTION ORAL DAILY PRN
Status: DISCONTINUED | OUTPATIENT
Start: 2022-04-04 | End: 2022-04-05 | Stop reason: HOSPADM

## 2022-04-04 RX ORDER — ONDANSETRON 2 MG/ML
4 INJECTION INTRAMUSCULAR; INTRAVENOUS
Status: DISCONTINUED | OUTPATIENT
Start: 2022-04-04 | End: 2022-04-05 | Stop reason: HOSPADM

## 2022-04-04 RX ADMIN — SODIUM CHLORIDE, PRESERVATIVE FREE 10 ML: 5 INJECTION INTRAVENOUS at 21:44

## 2022-04-04 RX ADMIN — SODIUM CHLORIDE 5 MG/HR: 900 INJECTION, SOLUTION INTRAVENOUS at 18:28

## 2022-04-04 RX ADMIN — SODIUM CHLORIDE 1000 ML: 9 INJECTION, SOLUTION INTRAVENOUS at 17:29

## 2022-04-04 RX ADMIN — SODIUM CHLORIDE 1000 ML: 9 INJECTION, SOLUTION INTRAVENOUS at 18:28

## 2022-04-04 RX ADMIN — METOPROLOL TARTRATE 2.5 MG: 1 INJECTION, SOLUTION INTRAVENOUS at 17:30

## 2022-04-04 RX ADMIN — DILTIAZEM HYDROCHLORIDE 5 MG: 5 INJECTION INTRAVENOUS at 16:25

## 2022-04-04 NOTE — H&P
Hospitalist Admission Note    NAME: Mathew Schultz   :  1960   MRN:  [id number]     Date/Time:  2022 7:31 PM    Patient PCP: None  ______________________________________________________________________  Given the patient's current clinical presentation, I have a high level of concern for decompensation if discharged from the emergency department. Complex decision making was performed, which includes reviewing the patient's available past medical records, laboratory results, and x-ray films. My assessment of this patient's clinical condition and my plan of care is as follows. Assessment / Plan:  A. fib with RVR  Admit patient to the stepdown  Start patient Cardizem drip  cardiology consultation  Continue home medication Eliquis  Echocardiogram  Keep patient n.p.o. post midnight for possible DCC    MAYANK  Continue CPAP        Code Status: full  Surrogate Decision Maker:    DVT Prophylaxis: Eliquis   GI Prophylaxis: not indicated      Subjective:   CHIEF COMPLAINT: palpitation     HISTORY OF PRESENT ILLNESS:     63years old female from home with medical history significant for atrial fibrillation, hypertension presented to the hospital for evaluation of palpitation started earlier today, patient denies any chest pain and shortness of breath denies any dizziness, blood work was done showed no acute abnormality,    We were asked to admit for work up and evaluation of the above problems.      Past Medical History:   Diagnosis Date    Atrial fibrillation ([address]     Long term current use of anticoagulant therapy     Eliquis    MAYANK (obstructive sleep apnea)         Past Surgical History:   Procedure Laterality Date    HX TUBAL LIGATION         Social History     Tobacco Use    Smoking status: Current Every Day Smoker     Packs/day: 1.00     Years: 35.00     Pack years: 35.00     Types: Cigarettes    Smokeless tobacco: Never Used   Substance Use Topics    Alcohol use: Not Currently        Family History   Problem Relation Age of Onset    Other Other         no known FH of CAD    Hypertension Father     Atrial Fibrillation Father      Allergies   Allergen Reactions    Beef Containing Products Hives    Pork/Porcine Containing Products Hives    Erythromycin Other (comments)    Macrobid [Nitrofurantoin Monohyd/M-Cryst] Hives    Pcn [Penicillins] Hives        Prior to Admission medications    Medication Sig Start Date End Date Taking? Authorizing Provider   Eliquis 5 mg tablet TAKE 1 TABLET BY MOUTH TWICE A DAY 11/7/21   Jacob Siddiqi, REINA   propafenone SR (RYTHMOL SR) 225 mg SR capsule TAKE 1 CAPSULE BY MOUTH EVERY 12 HOURS 8/12/21   Joycelyn Boone MD   metoprolol succinate (TOPROL-XL) 25 mg XL tablet TAKE 1 TABLET BY MOUTH EVERY NIGHT 8/12/21   Mary Wilson MD       REVIEW OF SYSTEMS:     I am not able to complete the review of systems because:    The patient is intubated and sedated    The patient has altered mental status due to his acute medical problems    The patient has baseline aphasia from prior stroke(s)    The patient has baseline dementia and is not reliable historian    The patient is in acute medical distress and unable to provide information           Total of 12 systems reviewed as follows:       POSITIVE= underlined text  Negative = text not underlined  General:  fever, chills, sweats, generalized weakness, weight loss/gain,      loss of appetite   Eyes:    blurred vision, eye pain, loss of vision, double vision  ENT:    rhinorrhea, pharyngitis   Respiratory:   cough, sputum production, SOB, NIETO, wheezing, pleuritic pain   Cardiology:   chest pain, palpitations, orthopnea, PND, edema, syncope   Gastrointestinal:  abdominal pain , N/V, diarrhea, dysphagia, constipation, bleeding   Genitourinary:  frequency, urgency, dysuria, hematuria, incontinence   Muskuloskeletal :  arthralgia, myalgia, back pain  Hematology:  easy bruising, nose or gum bleeding, lymphadenopathy   Dermatological: rash, ulceration, pruritis, color change / jaundice  Endocrine:   hot flashes or polydipsia   Neurological:  headache, dizziness, confusion, focal weakness, paresthesia,     Speech difficulties, memory loss, gait difficulty  Psychological: Feelings of anxiety, depression, agitation    Objective:   VITALS:    Visit Vitals  BP (!) 95/58   Pulse (!) 138   Temp 97.5 °F (36.4 °C)   Resp 13   Ht 5' 6\" (1.676 m)   Wt 108.2 kg (238 lb 8.6 oz)   SpO2 100%   BMI 38.50 kg/m²       PHYSICAL EXAM:    General:    Alert, cooperative, no distress, appears stated age. HEENT: Atraumatic, anicteric sclerae, pink conjunctivae     No oral ulcers, mucosa moist, throat clear, dentition fair  Neck:  Supple, symmetrical,  thyroid: non tender  Lungs:   Clear to auscultation bilaterally. No Wheezing or Rhonchi. No rales. Chest wall:  No tenderness  No Accessory muscle use. Heart:   Regular  rhythm,  No  murmur   No edema  Abdomen:   Soft, non-tender. Not distended. Bowel sounds normal  Extremities: No cyanosis. No clubbing,      Skin turgor normal, Capillary refill normal, Radial dial pulse 2+  Skin:     Not pale. Not Jaundiced  No rashes   Psych:  Good insight. Not depressed. Not anxious or agitated. Neurologic: EOMs intact. No facial asymmetry. No aphasia or slurred speech. Symmetrical strength, Sensation grossly intact.  Alert and oriented X 4.     _______________________________________________________________________  Care Plan discussed with:    Comments   Patient y    Family      RN y    Care Manager                    Consultant:      _______________________________________________________________________  Expected  Disposition:   Home with Family    HH/PT/OT/RN    SNF/LTC    SHIVA    ________________________________________________________________________  TOTAL TIME: 54 Minutes    Critical Care Provided     Minutes non procedure based      Comments     Reviewed previous records   >50% of visit spent in counseling and coordination of care  Discussion with patient and/or family and questions answered       ________________________________________________________________________  Signed: Hoa Connor MD    Procedures: see electronic medical records for all procedures/Xrays and details which were not copied into this note but were reviewed prior to creation of Plan. LAB DATA REVIEWED:    Recent Results (from the past 24 hour(s))   CBC WITH AUTOMATED DIFF    Collection Time: 04/04/22  3:25 PM   Result Value Ref Range    WBC 11.0 3.6 - 11.0 K/uL    RBC 5.25 (H) 3.80 - 5.20 M/uL    HGB 16.4 (H) 11.5 - 16.0 g/dL    HCT 49.8 (H) 35.0 - 47.0 %    MCV 94.9 80.0 - 99.0 FL    MCH 31.2 26.0 - 34.0 PG    MCHC 32.9 30.0 - 36.5 g/dL    RDW 12.7 11.5 - 14.5 %    PLATELET 470 450 - 254 K/uL    MPV 10.1 8.9 - 12.9 FL    NRBC 0.0 0  WBC    ABSOLUTE NRBC 0.00 0.00 - 0.01 K/uL    NEUTROPHILS 68 32 - 75 %    LYMPHOCYTES 23 12 - 49 %    MONOCYTES 6 5 - 13 %    EOSINOPHILS 2 0 - 7 %    BASOPHILS 0 0 - 1 %    IMMATURE GRANULOCYTES 1 (H) 0.0 - 0.5 %    ABS. NEUTROPHILS 7.4 1.8 - 8.0 K/UL    ABS. LYMPHOCYTES 2.6 0.8 - 3.5 K/UL    ABS. MONOCYTES 0.7 0.0 - 1.0 K/UL    ABS. EOSINOPHILS 0.2 0.0 - 0.4 K/UL    ABS. BASOPHILS 0.0 0.0 - 0.1 K/UL    ABS. IMM. GRANS. 0.1 (H) 0.00 - 0.04 K/UL    DF AUTOMATED     METABOLIC PANEL, COMPREHENSIVE    Collection Time: 04/04/22  3:25 PM   Result Value Ref Range    Sodium 138 136 - 145 mmol/L    Potassium 3.8 3.5 - 5.1 mmol/L    Chloride 104 97 - 108 mmol/L    CO2 30 21 - 32 mmol/L    Anion gap 4 (L) 5 - 15 mmol/L    Glucose 141 (H) 65 - 100 mg/dL    BUN 18 6 - 20 MG/DL    Creatinine 1.07 (H) 0.55 - 1.02 MG/DL    BUN/Creatinine ratio 17 12 - 20      GFR est AA >60 >60 ml/min/1.73m2    GFR est non-AA 52 (L) >60 ml/min/1.73m2    Calcium 9.6 8.5 - 10.1 MG/DL    Bilirubin, total 0.4 0.2 - 1.0 MG/DL    ALT (SGPT) 26 12 - 78 U/L    AST (SGOT) 16 15 - 37 U/L    Alk.  phosphatase 93 45 - 117 U/L    Protein, total 7.8 6.4 - 8.2 g/dL    Albumin 3.7 3.5 - 5.0 g/dL    Globulin 4.1 (H) 2.0 - 4.0 g/dL    A-G Ratio 0.9 (L) 1.1 - 2.2     TROPONIN-HIGH SENSITIVITY    Collection Time: 04/04/22  3:25 PM   Result Value Ref Range    Troponin-High Sensitivity 6 0 - 51 ng/L   EKG, 12 LEAD, INITIAL    Collection Time: 04/04/22  3:33 PM   Result Value Ref Range    Ventricular Rate 150 BPM    Atrial Rate 250 BPM    QRS Duration 80 ms    Q-T Interval 320 ms    QTC Calculation (Bezet) 505 ms    Calculated T Axis 168 degrees    Diagnosis       Atrial fibrillation with rapid ventricular response  Septal infarct , age undetermined  Marked ST abnormality, possible inferior subendocardial injury  When compared with ECG of 24-MAR-2022 01:54,  Atrial fibrillation has replaced Atrial flutter  Septal infarct is now present  ST less depressed in Inferior leads  T wave inversion no longer evident in Inferior leads  T wave inversion less evident in Anterolateral leads

## 2022-04-04 NOTE — ED PROVIDER NOTES
EMERGENCY DEPARTMENT HISTORY AND PHYSICAL EXAM      Date: 4/4/2022  Patient Name: Harris Zarate    History of Presenting Illness     Chief Complaint   Patient presents with    Irregular Heart Beat     \" I have AFib\" and can feel her heart racing today. heart rate in triage 127-154       History Provided By: Patient    HPI: Harris Zarate, 63 y.o. female presents to the ED with cc of heart racing. Patient has a history of A. fib. She is on Eliquis, Rythmol and metoprolol. She noticed that her heart was racing today. She denies chest pain, shortness of breath, cough, fever or chills. She denies nausea but states,  sometimes she has a gurgling sensation in her stomach when her heart is racing. She denies abdominal pain specifically. She denies dysuria or change in bowel habits. She has been taking her medications appropriately. There are no other complaints, changes, or physical findings at this time. PCP: None    No current facility-administered medications on file prior to encounter.      Current Outpatient Medications on File Prior to Encounter   Medication Sig Dispense Refill    Eliquis 5 mg tablet TAKE 1 TABLET BY MOUTH TWICE A  Tablet 1    propafenone SR (RYTHMOL SR) 225 mg SR capsule TAKE 1 CAPSULE BY MOUTH EVERY 12 HOURS 180 Capsule 3    metoprolol succinate (TOPROL-XL) 25 mg XL tablet TAKE 1 TABLET BY MOUTH EVERY NIGHT 90 Tablet 3       Past History     Past Medical History:  Past Medical History:   Diagnosis Date    Atrial fibrillation ([address]     Long term current use of anticoagulant therapy     Eliquis    MAYANK (obstructive sleep apnea)        Past Surgical History:  Past Surgical History:   Procedure Laterality Date    HX TUBAL LIGATION         Family History:  Family History   Problem Relation Age of Onset    Other Other         no known FH of CAD    Hypertension Father     Atrial Fibrillation Father        Social History:  Social History     Tobacco Use    Smoking status: Current Every Day Smoker     Packs/day: 1.00     Years: 35.00     Pack years: 35.00     Types: Cigarettes    Smokeless tobacco: Never Used   Substance Use Topics    Alcohol use: Not Currently    Drug use: No       Allergies: Allergies   Allergen Reactions    Beef Containing Products Hives    Pork/Porcine Containing Products Hives    Erythromycin Other (comments)    Macrobid [Nitrofurantoin Monohyd/M-Cryst] Hives    Pcn [Penicillins] Hives         Review of Systems   Review of Systems   Constitutional: Negative for fever. HENT: Negative for congestion. Eyes: Negative. Respiratory: Negative for shortness of breath. Cardiovascular: Positive for palpitations. Negative for chest pain. Gastrointestinal: Negative for abdominal pain. Endocrine: Negative for heat intolerance. Genitourinary: Negative for dysuria. Musculoskeletal: Negative for back pain. Skin: Negative for rash. Allergic/Immunologic: Negative for immunocompromised state. Neurological: Positive for light-headedness. Negative for dizziness. Hematological: Does not bruise/bleed easily. Psychiatric/Behavioral: Negative. All other systems reviewed and are negative. Physical Exam   Physical Exam  Vitals and nursing note reviewed. Constitutional:       General: She is not in acute distress. Appearance: She is well-developed. HENT:      Head: Normocephalic. Cardiovascular:      Rate and Rhythm: Tachycardia present. Rhythm irregular. Heart sounds: Normal heart sounds. Pulmonary:      Effort: Pulmonary effort is normal.      Breath sounds: Normal breath sounds. Abdominal:      General: Bowel sounds are normal.      Palpations: Abdomen is soft. Tenderness: There is no abdominal tenderness. Musculoskeletal:         General: Normal range of motion. Cervical back: Normal range of motion and neck supple. Skin:     General: Skin is warm and dry.    Neurological:      General: No focal deficit present. Mental Status: She is alert and oriented to person, place, and time. Psychiatric:         Mood and Affect: Mood normal.         Behavior: Behavior normal.         Diagnostic Study Results     Labs -     Recent Results (from the past 12 hour(s))   CBC WITH AUTOMATED DIFF    Collection Time: 04/04/22  3:25 PM   Result Value Ref Range    WBC 11.0 3.6 - 11.0 K/uL    RBC 5.25 (H) 3.80 - 5.20 M/uL    HGB 16.4 (H) 11.5 - 16.0 g/dL    HCT 49.8 (H) 35.0 - 47.0 %    MCV 94.9 80.0 - 99.0 FL    MCH 31.2 26.0 - 34.0 PG    MCHC 32.9 30.0 - 36.5 g/dL    RDW 12.7 11.5 - 14.5 %    PLATELET 682 424 - 423 K/uL    MPV 10.1 8.9 - 12.9 FL    NRBC 0.0 0  WBC    ABSOLUTE NRBC 0.00 0.00 - 0.01 K/uL    NEUTROPHILS 68 32 - 75 %    LYMPHOCYTES 23 12 - 49 %    MONOCYTES 6 5 - 13 %    EOSINOPHILS 2 0 - 7 %    BASOPHILS 0 0 - 1 %    IMMATURE GRANULOCYTES 1 (H) 0.0 - 0.5 %    ABS. NEUTROPHILS 7.4 1.8 - 8.0 K/UL    ABS. LYMPHOCYTES 2.6 0.8 - 3.5 K/UL    ABS. MONOCYTES 0.7 0.0 - 1.0 K/UL    ABS. EOSINOPHILS 0.2 0.0 - 0.4 K/UL    ABS. BASOPHILS 0.0 0.0 - 0.1 K/UL    ABS. IMM. GRANS. 0.1 (H) 0.00 - 0.04 K/UL    DF AUTOMATED     METABOLIC PANEL, COMPREHENSIVE    Collection Time: 04/04/22  3:25 PM   Result Value Ref Range    Sodium 138 136 - 145 mmol/L    Potassium 3.8 3.5 - 5.1 mmol/L    Chloride 104 97 - 108 mmol/L    CO2 30 21 - 32 mmol/L    Anion gap 4 (L) 5 - 15 mmol/L    Glucose 141 (H) 65 - 100 mg/dL    BUN 18 6 - 20 MG/DL    Creatinine 1.07 (H) 0.55 - 1.02 MG/DL    BUN/Creatinine ratio 17 12 - 20      GFR est AA >60 >60 ml/min/1.73m2    GFR est non-AA 52 (L) >60 ml/min/1.73m2    Calcium 9.6 8.5 - 10.1 MG/DL    Bilirubin, total 0.4 0.2 - 1.0 MG/DL    ALT (SGPT) 26 12 - 78 U/L    AST (SGOT) 16 15 - 37 U/L    Alk.  phosphatase 93 45 - 117 U/L    Protein, total 7.8 6.4 - 8.2 g/dL    Albumin 3.7 3.5 - 5.0 g/dL    Globulin 4.1 (H) 2.0 - 4.0 g/dL    A-G Ratio 0.9 (L) 1.1 - 2.2     TROPONIN-HIGH SENSITIVITY    Collection Time: 04/04/22  3:25 PM   Result Value Ref Range    Troponin-High Sensitivity 6 0 - 51 ng/L   EKG, 12 LEAD, INITIAL    Collection Time: 04/04/22  3:33 PM   Result Value Ref Range    Ventricular Rate 150 BPM    Atrial Rate 250 BPM    QRS Duration 80 ms    Q-T Interval 320 ms    QTC Calculation (Bezet) 505 ms    Calculated T Axis 168 degrees    Diagnosis       Atrial fibrillation with rapid ventricular response  Septal infarct , age undetermined  Marked ST abnormality, possible inferior subendocardial injury  When compared with ECG of 24-MAR-2022 01:54,  Atrial fibrillation has replaced Atrial flutter  Septal infarct is now present  ST less depressed in Inferior leads  T wave inversion no longer evident in Inferior leads  T wave inversion less evident in Anterolateral leads         Radiologic Studies -   No orders to display     CT Results  (Last 48 hours)    None        CXR Results  (Last 48 hours)    None          Medical Decision Making   I am the first provider for this patient. I reviewed the vital signs, available nursing notes, past medical history, past surgical history, family history and social history. Vital Signs-Reviewed the patient's vital signs. Patient Vitals for the past 12 hrs:   Temp Pulse Resp BP SpO2   04/04/22 1730  (!) 156  110/65    04/04/22 1646  (!) 147 11 (!) 102/58 96 %   04/04/22 1631  (!) 156 12 109/66 95 %   04/04/22 1625  (!) 149  (!) 105/42    04/04/22 1519 97.5 °F (36.4 °C) (!) 148 18 125/71 100 %       EKG interpretation: (Preliminary)  Rhythm: atrial fib; and irregular. Rate (approx.): 150; Axis: normal; QRS interval: normal ; ST/T wave: non-specific changes; Other findings: .    Records Reviewed: Nursing Notes, Old Medical Records, Previous electrocardiograms and Previous Laboratory Studies    Provider Notes (Medical Decision Making):   A. fib with RVR, atrial flutter, electrolyte abnormality, dehydration    ED Course:   Initial assessment performed.  The patients presenting problems have been discussed, and they are in agreement with the care plan formulated and outlined with them. I have encouraged them to ask questions as they arise throughout their visit. Consult note: The patient was evaluated by Dr. Terri Cooepr, cardiology. He recommends additional dosages of either Cardizem or Toprol, if the patient's rate improves, her Toprol dose can be doubled to 50 mg, otherwise she can get admitted to the hospitalist and he will follow-up    Consult note:    Patient is being admitted by the hospitalist             Critical Care Time:   CRITICAL CARE NOTE :    5:58 PM    IMPENDING DETERIORATION -Respiratory, Cardiovascular and Metabolic  ASSOCIATED RISK FACTORS - Hypotension, Hypoxia, Dysrhythmia and Metabolic changes  MANAGEMENT- Bedside Assessment and Supervision of Care  INTERPRETATION -   ECG and Blood Pressure  INTERVENTIONS - hemodynamic mngmt and Metobolic interventions  CASE REVIEW - Hospitalist/Intensivist, Medical Sub-Specialist and Nursing  TREATMENT RESPONSE -Unchanged   PERFORMED BY - Self    NOTES   :  I have spent 45 minutes of critical care time involved in lab review, consultations with specialist, family decision- making, bedside attention and documentation. This time excludes time spent in any separate billed procedures. During this entire length of time I was immediately available to the patient . Jacobo Mckeon MD      Disposition:  admit    DISCHARGE PLAN:  1. Current Discharge Medication List        2. Follow-up Information    None       3. Return to ED if worse     Diagnosis     Clinical Impression:   1. Atrial fibrillation with RVR ([address]        Attestations:    Jacobo Mckeon MD    Please note that this dictation was completed with [employer], the computer voice recognition software. Quite often unanticipated grammatical, syntax, homophones, and other interpretive errors are inadvertently transcribed by the computer software.   Please disregard these errors. Please excuse any errors that have escaped final proofreading. Thank you.

## 2022-04-04 NOTE — CONSULTS
Cardiology Consult Note       Date of  Admission: 4/4/2022  3:48 PM     Admission type:Emergency     Subjective:     Ms. Kunal Ellis has h/o paroxysmal afib/flutter, MAYANK, morbid obesity. She is followed by Dr. Anat Matthews. Presents with palpitations. Noted to be in flutter with . Similar episode 3/24. She is on propafenone, metoprolol and xarelto. States compliance with medication. Has f/u with EP to consider ablation.     Last echo 10/19, normal.    Patient Active Problem List    Diagnosis Date Noted    Severe obesity ([facility] 07/10/2020    MAYANK on CPAP 10/17/2019    Atrial fibrillation ([facility] 03/04/2018    Irregular heart beat 12/07/2015    PAF (paroxysmal atrial fibrillation) ([facility] 11/28/2015    Lower urinary tract infectious disease 11/28/2015    Allergic drug rash due to sulfonamide 11/28/2015      None  Past Medical History:   Diagnosis Date    Atrial fibrillation ([facility]     Long term current use of anticoagulant therapy     Eliquis    MAYANK (obstructive sleep apnea)       Past Surgical History:   Procedure Laterality Date    HX TUBAL LIGATION       Allergies   Allergen Reactions    Beef Containing Products Hives    Pork/Porcine Containing Products Hives    Erythromycin Other (comments)    Macrobid [Nitrofurantoin Monohyd/M-Cryst] Hives    Pcn [Penicillins] Hives      Family History   Problem Relation Age of Onset    Other Other         no known FH of CAD    Hypertension Father     Atrial Fibrillation Father       Current Facility-Administered Medications   Medication Dose Route Frequency    sodium chloride 0.9 % bolus infusion 1,000 mL  1,000 mL IntraVENous NOW     Current Outpatient Medications   Medication Sig    Eliquis 5 mg tablet TAKE 1 TABLET BY MOUTH TWICE A DAY    propafenone SR (RYTHMOL SR) 225 mg SR capsule TAKE 1 CAPSULE BY MOUTH EVERY 12 HOURS    metoprolol succinate (TOPROL-XL) 25 mg XL tablet TAKE 1 TABLET BY MOUTH EVERY NIGHT         Review of Symptoms:  Review of Systems Constitutional: Negative. Negative for chills and fever. HENT: Negative. Negative for hearing loss. Respiratory: Negative. Negative for cough, hemoptysis and shortness of breath. Cardiovascular: Positive for palpitations. Negative for chest pain, orthopnea, claudication, leg swelling and PND. Gastrointestinal: Negative. Negative for nausea and vomiting. Musculoskeletal: Negative for myalgias. Skin: Negative. Negative for rash. Neurological: Negative. Negative for dizziness, loss of consciousness and headaches. Physical Exam    Physical Exam  Vitals and nursing note reviewed. Constitutional:       Appearance: She is obese. Cardiovascular:      Rate and Rhythm: Rhythm irregular. Heart sounds: Normal heart sounds. Pulmonary:      Breath sounds: Normal breath sounds. Musculoskeletal:         General: No swelling. Skin:     General: Skin is warm. Neurological:      Mental Status: She is alert and oriented to person, place, and time. Psychiatric:         Mood and Affect: Mood normal.          Cardiographics    Telemetry: AFIB  ECG:  atrial fibrillation/flutter, rate 150  Echocardiogram: Not done    Labs:   Recent Results (from the past 24 hour(s))   CBC WITH AUTOMATED DIFF    Collection Time: 04/04/22  3:25 PM   Result Value Ref Range    WBC 11.0 3.6 - 11.0 K/uL    RBC 5.25 (H) 3.80 - 5.20 M/uL    HGB 16.4 (H) 11.5 - 16.0 g/dL    HCT 49.8 (H) 35.0 - 47.0 %    MCV 94.9 80.0 - 99.0 FL    MCH 31.2 26.0 - 34.0 PG    MCHC 32.9 30.0 - 36.5 g/dL    RDW 12.7 11.5 - 14.5 %    PLATELET 973 083 - 298 K/uL    MPV 10.1 8.9 - 12.9 FL    NRBC 0.0 0  WBC    ABSOLUTE NRBC 0.00 0.00 - 0.01 K/uL    NEUTROPHILS 68 32 - 75 %    LYMPHOCYTES 23 12 - 49 %    MONOCYTES 6 5 - 13 %    EOSINOPHILS 2 0 - 7 %    BASOPHILS 0 0 - 1 %    IMMATURE GRANULOCYTES 1 (H) 0.0 - 0.5 %    ABS. NEUTROPHILS 7.4 1.8 - 8.0 K/UL    ABS. LYMPHOCYTES 2.6 0.8 - 3.5 K/UL    ABS.  MONOCYTES 0.7 0.0 - 1.0 K/UL    ABS. EOSINOPHILS 0.2 0.0 - 0.4 K/UL    ABS. BASOPHILS 0.0 0.0 - 0.1 K/UL    ABS. IMM. GRANS. 0.1 (H) 0.00 - 0.04 K/UL    DF AUTOMATED     METABOLIC PANEL, COMPREHENSIVE    Collection Time: 04/04/22  3:25 PM   Result Value Ref Range    Sodium 138 136 - 145 mmol/L    Potassium 3.8 3.5 - 5.1 mmol/L    Chloride 104 97 - 108 mmol/L    CO2 30 21 - 32 mmol/L    Anion gap 4 (L) 5 - 15 mmol/L    Glucose 141 (H) 65 - 100 mg/dL    BUN 18 6 - 20 MG/DL    Creatinine 1.07 (H) 0.55 - 1.02 MG/DL    BUN/Creatinine ratio 17 12 - 20      GFR est AA >60 >60 ml/min/1.73m2    GFR est non-AA 52 (L) >60 ml/min/1.73m2    Calcium 9.6 8.5 - 10.1 MG/DL    Bilirubin, total 0.4 0.2 - 1.0 MG/DL    ALT (SGPT) 26 12 - 78 U/L    AST (SGOT) 16 15 - 37 U/L    Alk.  phosphatase 93 45 - 117 U/L    Protein, total 7.8 6.4 - 8.2 g/dL    Albumin 3.7 3.5 - 5.0 g/dL    Globulin 4.1 (H) 2.0 - 4.0 g/dL    A-G Ratio 0.9 (L) 1.1 - 2.2     TROPONIN-HIGH SENSITIVITY    Collection Time: 04/04/22  3:25 PM   Result Value Ref Range    Troponin-High Sensitivity 6 0 - 51 ng/L   EKG, 12 LEAD, INITIAL    Collection Time: 04/04/22  3:33 PM   Result Value Ref Range    Ventricular Rate 150 BPM    Atrial Rate 250 BPM    QRS Duration 80 ms    Q-T Interval 320 ms    QTC Calculation (Bezet) 505 ms    Calculated T Axis 168 degrees    Diagnosis       Atrial fibrillation with rapid ventricular response  Septal infarct , age undetermined  Marked ST abnormality, possible inferior subendocardial injury  When compared with ECG of 24-MAR-2022 01:54,  Atrial fibrillation has replaced Atrial flutter  Septal infarct is now present  ST less depressed in Inferior leads  T wave inversion no longer evident in Inferior leads  T wave inversion less evident in Anterolateral leads          Assessment:     Assessment:         Hospital Problems  Date Reviewed: 3/10/2022          Codes Class Noted POA    Severe obesity ([facility] ICD-10-CM: E66.01  ICD-9-CM: 278.01  7/10/2020 Yes        MAYANK on CPAP ICD-10-CM: G47.33, Z99.89  ICD-9-CM: 327.23, V46.8  10/17/2019 Yes        PAF (paroxysmal atrial fibrillation) ([facility] ICD-10-CM: I48.0  ICD-9-CM: 427.31  11/28/2015 Yes               Plan:     Afib/flutter- paroxysmal- continue propafenone. She has received IV cardizem. Can try additional dosing or try metoprolol. Continue  Xarelto. If converts to NSR, can go home on higher dose of toprol ( 50 MG)    [facility] tomorrow if remains in fib/flutter. Will need echo to assess left atrial size, EF. Discussed with ED attending Dr. Hoang.     Zita Murray MD

## 2022-04-05 ENCOUNTER — APPOINTMENT (OUTPATIENT)
Dept: NON INVASIVE DIAGNOSTICS | Age: 62
DRG: 310 | End: 2022-04-05
Attending: INTERNAL MEDICINE
Payer: COMMERCIAL

## 2022-04-05 ENCOUNTER — TELEPHONE (OUTPATIENT)
Dept: CARDIOLOGY CLINIC | Age: 62
End: 2022-04-05

## 2022-04-05 VITALS
HEIGHT: 66 IN | OXYGEN SATURATION: 97 % | HEART RATE: 65 BPM | DIASTOLIC BLOOD PRESSURE: 58 MMHG | TEMPERATURE: 98.2 F | SYSTOLIC BLOOD PRESSURE: 113 MMHG | WEIGHT: 238.54 LBS | BODY MASS INDEX: 38.34 KG/M2 | RESPIRATION RATE: 16 BRPM

## 2022-04-05 PROBLEM — I48.92 PAROXYSMAL ATRIAL FLUTTER (HCC): Status: ACTIVE | Noted: 2022-04-05

## 2022-04-05 LAB
ANION GAP SERPL CALC-SCNC: 3 MMOL/L (ref 5–15)
ATRIAL RATE: 250 BPM
ATRIAL RATE: 58 BPM
BUN SERPL-MCNC: 16 MG/DL (ref 6–20)
BUN/CREAT SERPL: 21 (ref 12–20)
CALCIUM SERPL-MCNC: 8.7 MG/DL (ref 8.5–10.1)
CALCULATED P AXIS, ECG09: 44 DEGREES
CALCULATED R AXIS, ECG10: -9 DEGREES
CALCULATED T AXIS, ECG11: -147 DEGREES
CALCULATED T AXIS, ECG11: 168 DEGREES
CHLORIDE SERPL-SCNC: 114 MMOL/L (ref 97–108)
CO2 SERPL-SCNC: 25 MMOL/L (ref 21–32)
CREAT SERPL-MCNC: 0.75 MG/DL (ref 0.55–1.02)
DIAGNOSIS, 93000: NORMAL
DIAGNOSIS, 93000: NORMAL
ECHO AO ROOT DIAM: 2.5 CM
ECHO AO ROOT INDEX: 1.16 CM/M2
ECHO AV AREA PEAK VELOCITY: 4.2 CM2
ECHO AV AREA/BSA PEAK VELOCITY: 1.9 CM2/M2
ECHO AV CUSP MM: 1.9 CM
ECHO AV PEAK GRADIENT: 3 MMHG
ECHO AV PEAK VELOCITY: 0.8 M/S
ECHO AV VELOCITY RATIO: 1.13
ECHO LA DIAMETER INDEX: 1.67 CM/M2
ECHO LA DIAMETER: 3.6 CM
ECHO LA TO AORTIC ROOT RATIO: 1.44
ECHO LV E' LATERAL VELOCITY: 11 CM/S
ECHO LV E' SEPTAL VELOCITY: 10 CM/S
ECHO LV FRACTIONAL SHORTENING: 32 % (ref 28–44)
ECHO LV INTERNAL DIMENSION DIASTOLE INDEX: 2.04 CM/M2
ECHO LV INTERNAL DIMENSION DIASTOLIC: 4.4 CM (ref 3.9–5.3)
ECHO LV INTERNAL DIMENSION SYSTOLIC INDEX: 1.39 CM/M2
ECHO LV INTERNAL DIMENSION SYSTOLIC: 3 CM
ECHO LV IVSD: 1.4 CM (ref 0.6–0.9)
ECHO LV IVSS: 1.8 CM
ECHO LV MASS 2D: 240.3 G (ref 67–162)
ECHO LV MASS INDEX 2D: 111.2 G/M2 (ref 43–95)
ECHO LV POSTERIOR WALL DIASTOLIC: 1.4 CM (ref 0.6–0.9)
ECHO LV POSTERIOR WALL SYSTOLIC: 1.6 CM
ECHO LV RELATIVE WALL THICKNESS RATIO: 0.64
ECHO LVOT AREA: 3.5 CM2
ECHO LVOT DIAM: 2.1 CM
ECHO LVOT PEAK GRADIENT: 3 MMHG
ECHO LVOT PEAK VELOCITY: 0.9 M/S
ECHO MV A VELOCITY: 0.84 M/S
ECHO MV E DECELERATION TIME (DT): 154.5 MS
ECHO MV E VELOCITY: 0.9 M/S
ECHO MV E/A RATIO: 1.07
ECHO MV E/E' LATERAL: 8.18
ECHO MV E/E' RATIO (AVERAGED): 8.59
ECHO MV E/E' SEPTAL: 9
ECHO MV REGURGITANT PEAK GRADIENT: 38 MMHG
ECHO MV REGURGITANT PEAK VELOCITY: 3.1 M/S
ECHO PV MAX VELOCITY: 1 M/S
ECHO PV PEAK GRADIENT: 4 MMHG
ECHO RV FREE WALL PEAK S': 14 CM/S
ECHO RVOT PEAK GRADIENT: 1 MMHG
ECHO RVOT PEAK VELOCITY: 0.6 M/S
ERYTHROCYTE [DISTWIDTH] IN BLOOD BY AUTOMATED COUNT: 12.7 % (ref 11.5–14.5)
GLUCOSE SERPL-MCNC: 102 MG/DL (ref 65–100)
HCT VFR BLD AUTO: 43.4 % (ref 35–47)
HGB BLD-MCNC: 14.1 G/DL (ref 11.5–16)
MCH RBC QN AUTO: 31.6 PG (ref 26–34)
MCHC RBC AUTO-ENTMCNC: 32.5 G/DL (ref 30–36.5)
MCV RBC AUTO: 97.3 FL (ref 80–99)
NRBC # BLD: 0 K/UL (ref 0–0.01)
NRBC BLD-RTO: 0 PER 100 WBC
P-R INTERVAL, ECG05: 152 MS
PLATELET # BLD AUTO: 228 K/UL (ref 150–400)
PMV BLD AUTO: 10.7 FL (ref 8.9–12.9)
POTASSIUM SERPL-SCNC: 4.5 MMOL/L (ref 3.5–5.1)
Q-T INTERVAL, ECG07: 320 MS
Q-T INTERVAL, ECG07: 418 MS
QRS DURATION, ECG06: 80 MS
QRS DURATION, ECG06: 82 MS
QTC CALCULATION (BEZET), ECG08: 410 MS
QTC CALCULATION (BEZET), ECG08: 505 MS
RBC # BLD AUTO: 4.46 M/UL (ref 3.8–5.2)
SODIUM SERPL-SCNC: 142 MMOL/L (ref 136–145)
VENTRICULAR RATE, ECG03: 150 BPM
VENTRICULAR RATE, ECG03: 58 BPM
WBC # BLD AUTO: 8.8 K/UL (ref 3.6–11)

## 2022-04-05 PROCEDURE — 93005 ELECTROCARDIOGRAM TRACING: CPT

## 2022-04-05 PROCEDURE — G0378 HOSPITAL OBSERVATION PER HR: HCPCS

## 2022-04-05 PROCEDURE — 93306 TTE W/DOPPLER COMPLETE: CPT | Performed by: INTERNAL MEDICINE

## 2022-04-05 PROCEDURE — 74011250637 HC RX REV CODE- 250/637: Performed by: INTERNAL MEDICINE

## 2022-04-05 PROCEDURE — C8929 TTE W OR WO FOL WCON,DOPPLER: HCPCS

## 2022-04-05 PROCEDURE — 74011000250 HC RX REV CODE- 250: Performed by: INTERNAL MEDICINE

## 2022-04-05 PROCEDURE — 36415 COLL VENOUS BLD VENIPUNCTURE: CPT

## 2022-04-05 PROCEDURE — 99233 SBSQ HOSP IP/OBS HIGH 50: CPT | Performed by: INTERNAL MEDICINE

## 2022-04-05 PROCEDURE — 74011250636 HC RX REV CODE- 250/636: Performed by: STUDENT IN AN ORGANIZED HEALTH CARE EDUCATION/TRAINING PROGRAM

## 2022-04-05 PROCEDURE — 85027 COMPLETE CBC AUTOMATED: CPT

## 2022-04-05 PROCEDURE — 80048 BASIC METABOLIC PNL TOTAL CA: CPT

## 2022-04-05 RX ORDER — PROPAFENONE HYDROCHLORIDE 325 MG/1
325 CAPSULE, EXTENDED RELEASE ORAL 2 TIMES DAILY
Qty: 60 CAPSULE | Refills: 3 | Status: SHIPPED | OUTPATIENT
Start: 2022-04-05 | End: 2022-04-14 | Stop reason: SDUPTHER

## 2022-04-05 RX ORDER — PROPAFENONE HYDROCHLORIDE 225 MG/1
225 CAPSULE, EXTENDED RELEASE ORAL EVERY 12 HOURS
Status: DISCONTINUED | OUTPATIENT
Start: 2022-04-05 | End: 2022-04-05

## 2022-04-05 RX ORDER — PROPAFENONE HYDROCHLORIDE 325 MG/1
325 CAPSULE, EXTENDED RELEASE ORAL EVERY 12 HOURS
Status: DISCONTINUED | OUTPATIENT
Start: 2022-04-05 | End: 2022-04-05 | Stop reason: HOSPADM

## 2022-04-05 RX ORDER — METOPROLOL SUCCINATE 25 MG/1
25 TABLET, EXTENDED RELEASE ORAL DAILY
Status: DISCONTINUED | OUTPATIENT
Start: 2022-04-05 | End: 2022-04-05

## 2022-04-05 RX ORDER — METOPROLOL SUCCINATE 25 MG/1
25 TABLET, EXTENDED RELEASE ORAL DAILY
Status: DISCONTINUED | OUTPATIENT
Start: 2022-04-06 | End: 2022-04-05 | Stop reason: HOSPADM

## 2022-04-05 RX ORDER — PROPAFENONE HYDROCHLORIDE 150 MG/1
300 TABLET, FILM COATED ORAL ONCE
Status: DISCONTINUED | OUTPATIENT
Start: 2022-04-05 | End: 2022-04-05

## 2022-04-05 RX ADMIN — PROPAFENONE HYDROCHLORIDE 225 MG: 225 CAPSULE, EXTENDED RELEASE ORAL at 08:48

## 2022-04-05 RX ADMIN — PERFLUTREN 2 ML: 6.52 INJECTION, SUSPENSION INTRAVENOUS at 14:32

## 2022-04-05 RX ADMIN — SODIUM CHLORIDE, PRESERVATIVE FREE 10 ML: 5 INJECTION INTRAVENOUS at 06:03

## 2022-04-05 RX ADMIN — APIXABAN 5 MG: 5 TABLET, FILM COATED ORAL at 08:48

## 2022-04-05 NOTE — PROGRESS NOTES
Transition of Care Plan:    RUR: 6% - Low Risk  Disposition: Home  Follow up appointments: Need New PCP appointment   DME needed: none  Transportation at Discharge: Family to provide transportation   [place] or means to access home:   Pt has access    IM Medicare Letter:  N/a - Commercial insurance  Is patient a BCPI-A Bundle:        n/a   If yes, was Bundle Letter given?:    Is patient a  and connected with the [place]?       n/a         If yes, was [employer] transfer form completed and [facility] notified? Caregiver Contact:  Viraj Echevarria- [phone]  Discharge Caregiver contacted prior to discharge? If pt desires  Care Conference needed?: n/a    CM introduced self and role to pt at bedside, verified pt demographics, insurance info and emergency contact; Pt lives with daughter and grandchildren in two story home. Pt independent with ADL's, IADL's, ambulating and drives. No HH or SNF in the past. No DME . Uses [facility] on [place] and [address]. Reason for Admission:  A. fib with RVR                     RUR Score:      6%             Plan for utilizing home health:      none    PCP: First and Last name:  None- Looking for New PCP  Plan to arrange at d/c     Name of Practice:    Are you a current patient: Yes/No:    Approximate date of last visit:    Can you participate in a virtual visit with your PCP:                     Current Advanced Directive/Advance Care Plan: Full Code     Advance Care Planning     General Advance Care Planning (ACP) Conversation      Date of Conversation: 4/5/22  Conducted with: Patient with Decision Making Capacity    Healthcare Decision Maker:   No healthcare decision makers have been documented. Click here to complete [address] including selection of the Healthcare Decision Maker Relationship (ie \"Primary\")    Today we documented Decision Maker(s) consistent with Legal Next of Kin hierarchy.     Content/Action Overview:   DECLINED ACP conversation - will revisit periodically   Reviewed DNR/DNI and patient elects Full Code (Attempt Resuscitation)    Length of Voluntary ACP Conversation in minutes:  21 minutes    Saintclair Ply, RN       Healthcare Decision Maker:   Click here to complete [address] including selection of the Healthcare Decision Maker Relationship (ie \"Primary\")           Viraj Cardenas- [phone]                  Transition of Care Plan:                      Home     Care Management Interventions  Mode of Transport at Discharge:  Other (see comment) (Family to provide transportation)  Transition of Care Consult (CM Consult): Discharge Planning  Discharge Durable Medical Equipment: No  Physical Therapy Consult: No  Occupational Therapy Consult: No  Support Systems: Child(ivy)  Confirm Follow Up Transport: Self  Discharge Location  Patient Expects to be Discharged to[de-identified] [facility] [address]  Phone: [phone]

## 2022-04-05 NOTE — PROGRESS NOTES
Problem: Patient Education: Go to Patient Education Activity  Goal: Patient/Family Education  4/5/2022 1416 by Nuha Chris RN  Outcome: Resolved/Met  4/5/2022 0956 by Nuha Chris RN  Outcome: Progressing Towards Goal     Problem: Afib Pathway: Day 1  Goal: Off Pathway (Use only if patient is Off Pathway)  4/5/2022 1416 by Nuha Chris RN  Outcome: Resolved/Met  4/5/2022 0956 by Nuha Chris RN  Outcome: Progressing Towards Goal  Goal: Activity/Safety  4/5/2022 1416 by uNha Chris RN  Outcome: Resolved/Met  4/5/2022 0956 by Nuha Chris RN  Outcome: Progressing Towards Goal  Goal: Consults, if ordered  4/5/2022 1416 by Nuha Chris RN  Outcome: Resolved/Met  4/5/2022 0956 by Nuha Chris RN  Outcome: Progressing Towards Goal  Goal: Diagnostic Test/Procedures  4/5/2022 1416 by Nuha Chris RN  Outcome: Resolved/Met  4/5/2022 0956 by Nuha Chris RN  Outcome: Progressing Towards Goal  Goal: Nutrition/Diet  4/5/2022 1416 by Nuha Chris RN  Outcome: Resolved/Met  4/5/2022 0956 by Nuha Chris RN  Outcome: Progressing Towards Goal  Goal: Discharge Planning  4/5/2022 1416 by Nuha Chris RN  Outcome: Resolved/Met  4/5/2022 0956 by Nuha Chris RN  Outcome: Progressing Towards Goal  Goal: Medications  4/5/2022 1416 by Nuha Chris, RN  Outcome: Resolved/Met  4/5/2022 0956 by Nuha Chris RN  Outcome: Progressing Towards Goal  Goal: Respiratory  4/5/2022 1416 by Nuha Chris RN  Outcome: Resolved/Met  4/5/2022 0956 by Nuha Chris RN  Outcome: Progressing Towards Goal  Goal: Treatments/Interventions/Procedures  4/5/2022 1416 by Nuha Chris, RN  Outcome: Resolved/Met  4/5/2022 0956 by Nuha Chris RN  Outcome: Progressing Towards Goal  Goal: Psychosocial  4/5/2022 1416 by Nuha Chris, RN  Outcome: Resolved/Met  4/5/2022 0956 by Nuha Chris RN  Outcome: Progressing Towards Goal  Goal: *Optimal pain control at patient's stated goal  4/5/2022 1416 by Sarah Leach, Amie Brown RN  Outcome: Resolved/Met  4/5/2022 0956 by Rangel Chatterjee RN  Outcome: Progressing Towards Goal  Goal: *Hemodynamically stable  4/5/2022 1416 by Rangel Chatterjee RN  Outcome: Resolved/Met  4/5/2022 0956 by Rangel Chatterjee RN  Outcome: Progressing Towards Goal  Goal: *Stable cardiac rhythm  4/5/2022 1416 by Rangel Chatterjee RN  Outcome: Resolved/Met  4/5/2022 0956 by Rangel Chatterjee RN  Outcome: Progressing Towards Goal  Goal: *Lungs clear or at baseline  4/5/2022 1416 by Rangel Chatterjee RN  Outcome: Resolved/Met  4/5/2022 0956 by Rangel Chatterjee RN  Outcome: Progressing Towards Goal  Goal: *Labs within defined limits  4/5/2022 1416 by Rangel Chatterjee RN  Outcome: Resolved/Met  4/5/2022 0956 by Rangel Chatterjee RN  Outcome: Progressing Towards Goal  Goal: *Describes available resources and support systems  4/5/2022 1416 by Rangel Chatterjee RN  Outcome: Resolved/Met  4/5/2022 0956 by Rangel Chatterjee RN  Outcome: Progressing Towards Goal     Problem: Afib Pathway: Day 2  Goal: Off Pathway (Use only if patient is Off Pathway)  4/5/2022 1416 by Rangel Chatterjee RN  Outcome: Resolved/Met  4/5/2022 0956 by Rangel Chatterjee RN  Outcome: Progressing Towards Goal  Goal: Activity/Safety  4/5/2022 1416 by Rangel Chatterjee RN  Outcome: Resolved/Met  4/5/2022 0956 by Rangel Chatterjee RN  Outcome: Progressing Towards Goal  Goal: Consults, if ordered  4/5/2022 1416 by Rangel Chatterjee, RN  Outcome: Resolved/Met  4/5/2022 0956 by Rangel Chatterjee RN  Outcome: Progressing Towards Goal  Goal: Diagnostic Test/Procedures  4/5/2022 1416 by Rangel Chatterjee RN  Outcome: Resolved/Met  4/5/2022 0956 by Rangel Chatterjee RN  Outcome: Progressing Towards Goal  Goal: Nutrition/Diet  4/5/2022 1416 by Rangel Chatterjee RN  Outcome: Resolved/Met  4/5/2022 0956 by Rangel Chatterjee RN  Outcome: Progressing Towards Goal  Goal: Discharge Planning  4/5/2022 1416 by Rangel Chatterjee RN  Outcome: Resolved/Met  4/5/2022 0956 by Rangel Chatterjee RN  Outcome: Progressing Towards Goal  Goal: Medications  4/5/2022 1416 by Alexis Laird RN  Outcome: Resolved/Met  4/5/2022 0956 by Alexis Laird RN  Outcome: Progressing Towards Goal  Goal: Respiratory  4/5/2022 1416 by Alexis Laird RN  Outcome: Resolved/Met  4/5/2022 0956 by Alexis Laird RN  Outcome: Progressing Towards Goal  Goal: Treatments/Interventions/Procedures  4/5/2022 1416 by Alexis Laird RN  Outcome: Resolved/Met  4/5/2022 0956 by Alexis Laird RN  Outcome: Progressing Towards Goal  Goal: Psychosocial  4/5/2022 1416 by Alexis Laird RN  Outcome: Resolved/Met  4/5/2022 0956 by Alexis Laird RN  Outcome: Progressing Towards Goal  Goal: *Hemodynamically stable  4/5/2022 1416 by Alexis Laird RN  Outcome: Resolved/Met  4/5/2022 0956 by Alexis Laird RN  Outcome: Progressing Towards Goal  Goal: *Optimal pain control at patient's stated goal  4/5/2022 1416 by Alexis Laird RN  Outcome: Resolved/Met  4/5/2022 0956 by Alexis Laird RN  Outcome: Progressing Towards Goal  Goal: *Stable cardiac rhythm  4/5/2022 1416 by Alexis Laird RN  Outcome: Resolved/Met  4/5/2022 0956 by Alexis Laird RN  Outcome: Progressing Towards Goal  Goal: *Lungs clear or at baseline  4/5/2022 1416 by Alexis Laird RN  Outcome: Resolved/Met  4/5/2022 0956 by Alexis Laird RN  Outcome: Progressing Towards Goal  Goal: *Describes available resources and support systems  4/5/2022 1416 by Alexis Laird RN  Outcome: Resolved/Met  4/5/2022 0956 by Alexis Laird RN  Outcome: Progressing Towards Goal     Problem: Afib Pathway: Day 3  Goal: Off Pathway (Use only if patient is Off Pathway)  4/5/2022 1416 by Alexis Laird RN  Outcome: Resolved/Met  4/5/2022 0956 by Alexis Laird RN  Outcome: Progressing Towards Goal  Goal: Activity/Safety  4/5/2022 1416 by Alexis Laird RN  Outcome: Resolved/Met  4/5/2022 0956 by Alexis Laird RN  Outcome: Progressing Towards Goal  Goal: Diagnostic Test/Procedures  4/5/2022 1416 by Alejandro Alas, RN  Outcome: Resolved/Met  4/5/2022 1371 by Alejandro Alas, RN  Outcome: Progressing Towards Goal  Goal: Nutrition/Diet  4/5/2022 1416 by Alejandro Alas, RN  Outcome: Resolved/Met  4/5/2022 0956 by Alejandro Alas, RN  Outcome: Progressing Towards Goal  Goal: Discharge Planning  4/5/2022 1416 by Alejandro Alas, RN  Outcome: Resolved/Met  4/5/2022 0956 by Alejandro Alas, RN  Outcome: Progressing Towards Goal  Goal: Medications  4/5/2022 1416 by Alejandro Alas, RN  Outcome: Resolved/Met  4/5/2022 0956 by Alejandro Alas, RN  Outcome: Progressing Towards Goal  Goal: Respiratory  4/5/2022 1416 by Alejandro Alas, RN  Outcome: Resolved/Met  4/5/2022 0956 by Alejandro Alas, RN  Outcome: Progressing Towards Goal  Goal: Treatments/Interventions/Procedures  4/5/2022 1416 by Alejandro Alas, RN  Outcome: Resolved/Met  4/5/2022 0956 by Alejandro Alas, RN  Outcome: Progressing Towards Goal  Goal: Psychosocial  4/5/2022 1416 by Alejandro Alas, RN  Outcome: Resolved/Met  4/5/2022 0956 by Alejandro Alas, RN  Outcome: Progressing Towards Goal     Problem: Afib: Discharge Outcomes (not in CAT)  Goal: *Hemodynamically stable  4/5/2022 1416 by Alejandro Alas, RN  Outcome: Resolved/Met  4/5/2022 0956 by Alejandro Alas, RN  Outcome: Progressing Towards Goal  Goal: *Stable cardiac rhythm  4/5/2022 1416 by Alejandro Alas, RN  Outcome: Resolved/Met  4/5/2022 0956 by Alejandro Alas, RN  Outcome: Progressing Towards Goal  Goal: *Lungs clear or at baseline  4/5/2022 1416 by Alejandro Alas, RN  Outcome: Resolved/Met  4/5/2022 0956 by Alejandro Alas, RN  Outcome: Progressing Towards Goal  Goal: *Optimal pain control at patient's stated goal  4/5/2022 1416 by Alejandro Alas, RN  Outcome: Resolved/Met  4/5/2022 0956 by Alejandro Alas, RN  Outcome: Progressing Towards Goal  Goal: *Identifies cardiac risk factors  4/5/2022 1416 by Alejandor Alas RN  Outcome: Resolved/Met  4/5/2022 0956 by Alejandro Alas RN  Outcome: Progressing Towards Goal  Goal: *Verbalizes home exercise program, activity guidelines, cardiac precautions  4/5/2022 1416 by Emmanuel Heredia RN  Outcome: Resolved/Met  4/5/2022 0956 by Emmanuel Heredia RN  Outcome: Progressing Towards Goal  Goal: *Verbalizes understanding and describes prescribed diet  4/5/2022 1416 by Emmanuel Heredia RN  Outcome: Resolved/Met  4/5/2022 0956 by Emmanuel Heredia RN  Outcome: Progressing Towards Goal  Goal: *Verbalizes understanding and describes medication purposes and frequencies  4/5/2022 1416 by Emmanuel Heredia RN  Outcome: Resolved/Met  4/5/2022 0956 by Emmanuel Heredia RN  Outcome: Progressing Towards Goal  Goal: *Anxiety reduced or absent  4/5/2022 1416 by Emmanuel Heredia RN  Outcome: Resolved/Met  4/5/2022 0956 by Emmanuel Heredia RN  Outcome: Progressing Towards Goal  Goal: *Understands and describes signs and symptoms to report to providers(Stroke Metric)  4/5/2022 1416 by Emmanuel Heredia RN  Outcome: Resolved/Met  4/5/2022 0956 by Emmanuel Heredia RN  Outcome: Progressing Towards Goal  Goal: *Describes follow-up/return visits to physicians  4/5/2022 1416 by Emmanuel Heredia RN  Outcome: Resolved/Met  4/5/2022 0956 by Emmanuel Heredia RN  Outcome: Progressing Towards Goal  Goal: *Describes available resources and support systems  4/5/2022 1416 by Emmanuel Heredia RN  Outcome: Resolved/Met  4/5/2022 0956 by Emmanuel Heredia RN  Outcome: Progressing Towards Goal  Goal: *Influenza immunization  4/5/2022 1416 by Emmanuel Heredia RN  Outcome: Resolved/Met  4/5/2022 0956 by Emmanuel Heredia RN  Outcome: Progressing Towards Goal  Goal: *Pneumococcal immunization  4/5/2022 1416 by Emmanuel Heredia RN  Outcome: Resolved/Met  4/5/2022 0956 by Emmanuel Heredia RN  Outcome: Progressing Towards Goal  Goal: *Describes smoking cessation resources  4/5/2022 1416 by Emmanuel Heredia RN  Outcome: Resolved/Met  4/5/2022 0956 by Emmanuel Heredia RN  Outcome: Progressing Towards Goal     Problem: Falls - Risk of  Goal: *Absence of Falls  Description: Document Johanny Saunders Fall Risk and appropriate interventions in the flowsheet.   4/5/2022 1416 by Precious Vargas, PATRICK  Outcome: Resolved/Met  Note: Fall Risk Interventions:            Medication Interventions: Assess postural VS orthostatic hypotension,Bed/chair exit alarm,Patient to call before getting OOB,Teach patient to arise slowly    Elimination Interventions: Bed/chair exit alarm,Call light in reach,Stay With Me (per policy),Toilet paper/wipes in reach,Toileting schedule/hourly rounds           4/5/2022 0956 by Prceious Vargas RN  Outcome: Progressing Towards Goal  Note: Fall Risk Interventions:            Medication Interventions: Assess postural VS orthostatic hypotension,Bed/chair exit alarm,Patient to call before getting OOB,Teach patient to arise slowly    Elimination Interventions: Bed/chair exit alarm,Call light in reach,Stay With Me (per policy),Toilet paper/wipes in reach,Toileting schedule/hourly rounds              Problem: Patient Education: Go to Patient Education Activity  Goal: Patient/Family Education  4/5/2022 1416 by Precious Vargas, RN  Outcome: Resolved/Met  4/5/2022 0956 by Precious Vargas RN  Outcome: Progressing Towards Goal

## 2022-04-05 NOTE — PROGRESS NOTES
[address] [place] [address] follow-up PCP transitional care appointment has been scheduled with Dr. El Hill on 4/8/22 at . Pending patient discharge.   Zana Johnson, Care Management Assistant

## 2022-04-05 NOTE — PROGRESS NOTES
Transition of Care Plan:     RUR: 6% - Low Risk  Disposition: Home  Follow up appointments: New PCP appointment scheduled  DME needed: none  Transportation at Discharge: Family to provide transportation   [address] or means to access home:   Pt has access    IM Medicare Letter:  N/a - Commercial insurance  Is patient a BCPI-A Bundle:        n/a              If yes, was Bundle Letter given?:    Is patient a  and connected with the [place]?       n/a         If yes, was [place] transfer form completed and [facility] notified? Caregiver Contact:  Viraj Grover- [phone]  Discharge Caregiver contacted prior to discharge? If pt desires  Care Conference needed?: n/a    Pt cleared for d/c from CM standpoint      CM discussed d/c plan with pt. Pt agreed  No questions or concerns at this time. No further CM needs at this time. Care Management Interventions  Mode of Transport at Discharge:  Other (see comment) (Family to provide transportation)  Transition of Care Consult (CM Consult): Discharge Planning  Discharge Durable Medical Equipment: No  Physical Therapy Consult: No  Occupational Therapy Consult: No  Support Systems: Child(ivy)  Confirm Follow Up Transport: Self  Discharge Location  Patient Expects to be Discharged to[de-identified] [facility] [address]  Phone: [phone]

## 2022-04-05 NOTE — PROGRESS NOTES
0700: Bedside and Verbal shift change report given to You Catalan (oncoming nurse) by Holly Couch (offgoing nurse). Report included the following information SBAR, Kardex, Intake/Output, MAR and Recent Results.

## 2022-04-05 NOTE — DISCHARGE SUMMARY
Hospitalist Discharge Summary     Patient ID:  Yolande Thorne  [id number]  63 y.o.  1960 4/4/2022    PCP on record: None    Admit date: 4/4/2022  Discharge date and time: 4/5/2022    DISCHARGE DIAGNOSIS:  Afib RVR  MAYANK    CONSULTATIONS:  None    Excerpted HPI from H&P of Ambreen Escobedo MD:  63years old female from home with medical history significant for atrial fibrillation, hypertension presented to the hospital for evaluation of palpitation started earlier today, patient denies any chest pain and shortness of breath denies any dizziness, blood work was done showed no acute abnormality,       ______________________________________________________________________  DISCHARGE SUMMARY/HOSPITAL COURSE:  for full details see H&P, daily progress notes, labs, consult notes. A. fib with RVR  Was on cardizem drip on admission, now in sinus rhythm. Off cardizem drip  Seen by cardiology, recommended increasing Rhythmol to 325 mg q12 and discharge today     MAYANK  Continue CPAP              _______________________________________________________________________  Patient seen and examined by me on discharge day. Pertinent Findings:  Gen:    Not in distress  Chest: Clear lungs  CVS:   Regular rhythm. No edema  Abd:  Soft, not distended, not tender  Neuro:  Alert,   _______________________________________________________________________  DISCHARGE MEDICATIONS:   Current Discharge Medication List      CONTINUE these medications which have CHANGED    Details   propafenone SR (RYTHMOL SR) 325 mg SR capsule Take 1 Capsule by mouth two (2) times a day.   Qty: 60 Capsule, Refills: 3  Start date: 4/5/2022         CONTINUE these medications which have NOT CHANGED    Details   Eliquis 5 mg tablet TAKE 1 TABLET BY MOUTH TWICE A DAY  Qty: 180 Tablet, Refills: 1      metoprolol succinate (TOPROL-XL) 25 mg XL tablet TAKE 1 TABLET BY MOUTH EVERY NIGHT  Qty: 90 Tablet, Refills: 3               Patient Follow Up Instructions:    Activity: Activity as tolerated  Diet: Cardiac Diet  Wound Care: None needed    Follow-up with   PMD________________________________________________________________    Risk of deterioration: Low    Condition at Discharge:  Stable  __________________________________________________________________    Disposition  Home with family, no needs    ____________________________________________________________________    Code Status: Full Code  ___________________________________________________________________      Total time in minutes spent coordinating this discharge (includes going over instructions, follow-up, prescriptions, and preparing report for sign off to her PCP) :  >30 minutes    Signed:  Yanni Collier MD

## 2022-04-05 NOTE — PROGRESS NOTES
End of Shift Note    Bedside shift change report given to Jeni Joaquin RN (oncoming nurse) by Rafal Dempsey RN (offgoing nurse). Report included the following information SBAR, Kardex, ED Summary, OR Summary, Procedure Summary, Intake/Output, MAR, Accordion, Recent Results, Med Rec Status and Cardiac Rhythm Sinus Rhythm    Shift worked:  7p-7a     Shift summary and any significant changes:    Pt arrived to floor in NSR, stayed on dilt drip at 2.5mg. HR dropped 55-60's. Held dilt per parameters. HR stayed in NSR without the drip the entire night. Concerns for physician to address:       Zone phone for oncoming shift:          Activity:  Activity Level: Up with Assistance  Number times ambulated in hallways past shift: 0  Number of times OOB to chair past shift: 0    Cardiac:   Cardiac Monitoring: Yes      Cardiac Rhythm: Sinus Rhythm    Access:   Current line(s): PIV     Genitourinary:   Urinary status: voiding    Respiratory:   O2 Device: None (Room air)  Chronic home O2 use?: NO  Incentive spirometer at bedside: NO       GI:     Current diet:  ADULT DIET Regular; Low Sodium (2 gm)  Passing flatus: YES  Tolerating current diet: YES       Pain Management:   Patient states pain is manageable on current regimen: YES    Skin:  Champ Score: 23  Interventions: float heels, increase time out of bed and limit briefs    Patient Safety:  Fall Score:  Total Score: 2  Interventions: gripper socks, pt to call before getting OOB and stay with me (per policy)       Length of Stay:  Expected LOS: - - -  Actual LOS: [address], RN

## 2022-04-05 NOTE — TELEPHONE ENCOUNTER
Mariela-please advise the patient I would like her to come in and get an EKG checked in about a week after increasing her Rythmol. Please schedule on approximately April 12.

## 2022-04-05 NOTE — PROGRESS NOTES
DISCHARGE SUMMARY FROM [facility] NURSE    The patient is stable for discharge. I have reviewed the discharge instructions with the patient. The patient verbalized understanding. All questions were fully answered. The patient verbalized no complaints. Hard scripts and medication handouts were given and reviewed with the patient. Appropriate educational materials and medication side effects teaching were also provided. The Cardiac monitor and IV line(s) were removed. The following personal items collected during admission were returned to the patient/family  Home medications:    Dental Appliance: Dental Appliances: Uppers,With patient  Vision:    Hearing Aid:    Jewelry: Jewelry: Watch  Clothing: Clothing: At bedside,Pants,Shirt,Slippers,With patient  Other Valuables: Other Valuables: Purse,At bedside,With patient  Valuables sent to safe: There were no personal belongings, valuables or home medications left at patient's bedside,  or safe.      Awaiting Patients ride

## 2022-04-05 NOTE — DISCHARGE INSTRUCTIONS
HOSPITALIST DISCHARGE INSTRUCTIONS    NAME: Kenneth Rodriguez   :  1960   MRN:  [id number]     Date/Time:  2022 1:54 PM    ADMIT DATE: 2022     DISCHARGE DATE: 2022     DISCHARGE DIAGNOSIS:  Active Problems:    PAF (paroxysmal atrial fibrillation) ([facility] (2015)      MAYANK on CPAP (10/17/2019)      Severe obesity ([facility] (7/10/2020)      AF (atrial fibrillation) ([facility] (2022)       MEDICATIONS:  As per medication reconciliation  list  · It is important that you take the medication exactly as they are prescribed. · Keep your medication in the bottles provided by the pharmacist and keep a list of the medication names, dosages, and times to be taken in your wallet. · Do not take other medications without consulting your doctor. Pain Management: per above medications    What to do at Home    Recommended diet:  Cardiac Diet    Recommended activity: Activity as tolerated    If you have questions regarding the hospital related prescriptions or hospital related issues please call [facility] jesika Webb at [phone]. If you experience any of the following symptoms then please call your primary care physician or return to the emergency room if you cannot get hold of your doctor:  Fever, chills, nausea, vomiting, diarrhea, change in mentation, falling, bleeding, shortness of breath,    Follow Up: With your cardiologist      Information obtained by :  I understand that if any problems occur once I am at home I am to contact my physician. I understand and acknowledge receipt of the instructions indicated above.                                                                                                                                            Physician's or R.N.'s Signature                                                                  Date/Time Patient or Representative Signature                                                          Date/Time

## 2022-04-05 NOTE — PROGRESS NOTES
Problem: Patient Education: Go to Patient Education Activity  Goal: Patient/Family Education  Outcome: Progressing Towards Goal     Problem: Afib Pathway: Day 1  Goal: Off Pathway (Use only if patient is Off Pathway)  Outcome: Progressing Towards Goal  Goal: Activity/Safety  Outcome: Progressing Towards Goal  Goal: Consults, if ordered  Outcome: Progressing Towards Goal  Goal: Diagnostic Test/Procedures  Outcome: Progressing Towards Goal  Goal: Nutrition/Diet  Outcome: Progressing Towards Goal  Goal: Discharge Planning  Outcome: Progressing Towards Goal  Goal: Medications  Outcome: Progressing Towards Goal  Goal: Respiratory  Outcome: Progressing Towards Goal  Goal: Treatments/Interventions/Procedures  Outcome: Progressing Towards Goal  Goal: Psychosocial  Outcome: Progressing Towards Goal  Goal: *Optimal pain control at patient's stated goal  Outcome: Progressing Towards Goal  Goal: *Hemodynamically stable  Outcome: Progressing Towards Goal  Goal: *Stable cardiac rhythm  Outcome: Progressing Towards Goal  Goal: *Lungs clear or at baseline  Outcome: Progressing Towards Goal  Goal: *Labs within defined limits  Outcome: Progressing Towards Goal  Goal: *Describes available resources and support systems  Outcome: Progressing Towards Goal     Problem: Afib Pathway: Day 2  Goal: Off Pathway (Use only if patient is Off Pathway)  Outcome: Progressing Towards Goal  Goal: Activity/Safety  Outcome: Progressing Towards Goal  Goal: Consults, if ordered  Outcome: Progressing Towards Goal  Goal: Diagnostic Test/Procedures  Outcome: Progressing Towards Goal  Goal: Nutrition/Diet  Outcome: Progressing Towards Goal  Goal: Discharge Planning  Outcome: Progressing Towards Goal  Goal: Medications  Outcome: Progressing Towards Goal  Goal: Respiratory  Outcome: Progressing Towards Goal  Goal: Treatments/Interventions/Procedures  Outcome: Progressing Towards Goal  Goal: Psychosocial  Outcome: Progressing Towards Goal  Goal: *Hemodynamically stable  Outcome: Progressing Towards Goal  Goal: *Optimal pain control at patient's stated goal  Outcome: Progressing Towards Goal  Goal: *Stable cardiac rhythm  Outcome: Progressing Towards Goal  Goal: *Lungs clear or at baseline  Outcome: Progressing Towards Goal  Goal: *Describes available resources and support systems  Outcome: Progressing Towards Goal     Problem: Afib Pathway: Day 3  Goal: Off Pathway (Use only if patient is Off Pathway)  Outcome: Progressing Towards Goal  Goal: Activity/Safety  Outcome: Progressing Towards Goal  Goal: Diagnostic Test/Procedures  Outcome: Progressing Towards Goal  Goal: Nutrition/Diet  Outcome: Progressing Towards Goal  Goal: Discharge Planning  Outcome: Progressing Towards Goal  Goal: Medications  Outcome: Progressing Towards Goal  Goal: Respiratory  Outcome: Progressing Towards Goal  Goal: Treatments/Interventions/Procedures  Outcome: Progressing Towards Goal  Goal: Psychosocial  Outcome: Progressing Towards Goal     Problem: Afib: Discharge Outcomes (not in CAT)  Goal: *Hemodynamically stable  Outcome: Progressing Towards Goal  Goal: *Stable cardiac rhythm  Outcome: Progressing Towards Goal  Goal: *Lungs clear or at baseline  Outcome: Progressing Towards Goal  Goal: *Optimal pain control at patient's stated goal  Outcome: Progressing Towards Goal  Goal: *Identifies cardiac risk factors  Outcome: Progressing Towards Goal  Goal: *Verbalizes home exercise program, activity guidelines, cardiac precautions  Outcome: Progressing Towards Goal  Goal: *Verbalizes understanding and describes prescribed diet  Outcome: Progressing Towards Goal  Goal: *Verbalizes understanding and describes medication purposes and frequencies  Outcome: Progressing Towards Goal  Goal: *Anxiety reduced or absent  Outcome: Progressing Towards Goal  Goal: *Understands and describes signs and symptoms to report to providers(Stroke Metric)  Outcome: Progressing Towards Goal  Goal: *Describes follow-up/return visits to physicians  Outcome: Progressing Towards Goal  Goal: *Describes available resources and support systems  Outcome: Progressing Towards Goal  Goal: *Influenza immunization  Outcome: Progressing Towards Goal  Goal: *Pneumococcal immunization  Outcome: Progressing Towards Goal  Goal: *Describes smoking cessation resources  Outcome: Progressing Towards Goal

## 2022-04-05 NOTE — PROGRESS NOTES
TRANSFER - IN REPORT:    Verbal report received from Aureliano Gardner RN(name) on Jason Comes  being received from ED14(unit) for routine progression of care      Report consisted of patients Situation, Background, Assessment and   Recommendations(SBAR). Information from the following report(s) SBAR, Kardex, ED Summary, Intake/Output, MAR, Recent Results, Med Rec Status and Cardiac Rhythm Afib/aflutter was reviewed with the receiving nurse. Opportunity for questions and clarification was provided. Assessment completed upon patients arrival to unit and care assumed.

## 2022-04-05 NOTE — PROGRESS NOTES
Problem: Afib Pathway: Day 2  Goal: Activity/Safety  Outcome: Progressing Towards Goal     Problem: Afib Pathway: Day 2  Goal: Consults, if ordered  Outcome: Progressing Towards Goal     Problem: Afib Pathway: Day 2  Goal: Diagnostic Test/Procedures  Outcome: Progressing Towards Goal     Problem: Afib Pathway: Day 2  Goal: Nutrition/Diet  Outcome: Progressing Towards Goal     Problem: Afib Pathway: Day 2  Goal: Medications  Outcome: Progressing Towards Goal     Problem: Falls - Risk of  Goal: *Absence of Falls  Description: Document Alfredito Fall Risk and appropriate interventions in the flowsheet.   Outcome: Progressing Towards Goal  Note: Fall Risk Interventions:            Medication Interventions: Assess postural VS orthostatic hypotension,Bed/chair exit alarm,Patient to call before getting OOB,Teach patient to arise slowly    Elimination Interventions: Bed/chair exit alarm,Call light in reach,Stay With Me (per policy),Toilet paper/wipes in reach,Toileting schedule/hourly rounds

## 2022-04-06 NOTE — PROGRESS NOTES
4/5/2022 8:26 PM    Admit Date: 4/4/2022    Admit Diagnosis:   AF (atrial fibrillation) (Formerly [facility]) [I48.91]    Subjective:     Rui Groveyamile     No current facility-administered medications for this encounter. Current Outpatient Medications   Medication Sig    propafenone SR (RYTHMOL SR) 325 mg SR capsule Take 1 Capsule by mouth two (2) times a day.  Eliquis 5 mg tablet TAKE 1 TABLET BY MOUTH TWICE A DAY    metoprolol succinate (TOPROL-XL) 25 mg XL tablet TAKE 1 TABLET BY MOUTH EVERY NIGHT         Objective:      Physical Exam:    Visit Vitals  BP (!) 113/58 (BP 1 Location: Left upper arm)   Pulse 65   Temp 98.2 °F (36.8 °C)   Resp 16   Ht 5' 6\" (1.676 m)   Wt 238 lb 8.6 oz (108.2 kg)   SpO2 97%   BMI 38.50 kg/m²     Gen:  NAD  Mental Status - Alert. General Appearance - Not in acute distress. Chest and Lung Exam   Inspection: Accessory muscles - No use of accessory muscles in breathing. Auscultation:   Breath sounds: - Normal.   Cardiovascular   Inspection: Jugular vein - Bilateral - Inspection Normal.   Palpation/Percussion:   Apical Impulse: - Normal.   Auscultation: Rhythm - Regular. Heart Sounds - S1 WNL and S2 WNL. No S3 or S4. Murmurs & Other Heart Sounds: Auscultation of the heart reveals - No Murmurs. Peripheral Vascular   Upper Extremity: Inspection - Bilateral - No Cyanotic nailbeds or Digital clubbing. Lower Extremity:   Palpation: Edema - Bilateral - No edema. Abdomen:   Soft, non-tender, bowel sounds are active. Neuro: A&O times 3, CN and motor grossly WNL    Data Review:   Recent Labs     04/05/22  0015 04/04/22  1525   WBC 8.8 11.0   HGB 14.1 16.4*   HCT 43.4 49.8*    256     Recent Labs     04/05/22  0015 04/04/22  1525    138   K 4.5 3.8   * 104   CO2 25 30   * 141*   BUN 16 18   CREA 0.75 1.07*   CA 8.7 9.6   ALB  --  3.7   TBILI  --  0.4   ALT  --  26       No results for input(s): TROIQ, CPK, CKMB in the last 72 hours.     No intake or output data in the 24 hours ending 04/05/22 2026     Telemetry: normal sinus rhythm    Assessment:     Principal Problem:    Paroxysmal atrial flutter ([address] (4/5/2022)    Active Problems:    PAF (paroxysmal atrial fibrillation) ([address] (11/28/2015)      MAYANK on CPAP (10/17/2019)      Severe obesity ([address] (7/10/2020)      AF (atrial fibrillation) ([address] (4/4/2022)        Plan:     Afib/flutter- paroxysmal     back in NSR- increase propafenone. Echo without significant structural heart disease     Continue  Xarelto and metoprolol. Okay for discharge from a cardiac standpoint, to follow-up for EKG in my office in about a week, and follow-up with Dr. Renee Fothergill of electrophysiology as scheduled.   Follow-up with me in about a month.     Will need echo to assess left atrial size, EF.

## 2022-04-11 ENCOUNTER — TELEPHONE (OUTPATIENT)
Dept: CARDIOLOGY CLINIC | Age: 62
End: 2022-04-11

## 2022-04-11 NOTE — TELEPHONE ENCOUNTER
----- Message from Britta Nice NP sent at 4/11/2022  3:28 PM EDT -----  Please call the patient and inform that echocardiogram is normal, ejection fraction is 60-65%. No concern for heart failure at this time. Valves working appropriately.     Thanks,  [employer]

## 2022-04-11 NOTE — PROGRESS NOTES
Please call the patient and inform that echocardiogram is normal, ejection fraction is 60-65%. No concern for heart failure at this time. Valves working appropriately.     Thanks,  [employer]

## 2022-04-14 ENCOUNTER — OFFICE VISIT (OUTPATIENT)
Dept: CARDIOLOGY CLINIC | Age: 62
End: 2022-04-14
Payer: COMMERCIAL

## 2022-04-14 VITALS
HEART RATE: 70 BPM | WEIGHT: 239.9 LBS | OXYGEN SATURATION: 98 % | HEIGHT: 66 IN | DIASTOLIC BLOOD PRESSURE: 70 MMHG | BODY MASS INDEX: 38.56 KG/M2 | RESPIRATION RATE: 18 BRPM | SYSTOLIC BLOOD PRESSURE: 132 MMHG

## 2022-04-14 DIAGNOSIS — Z99.89 OSA ON CPAP: ICD-10-CM

## 2022-04-14 DIAGNOSIS — I48.0 PAF (PAROXYSMAL ATRIAL FIBRILLATION) (HCC): Primary | ICD-10-CM

## 2022-04-14 DIAGNOSIS — I10 BENIGN ESSENTIAL HTN: ICD-10-CM

## 2022-04-14 DIAGNOSIS — G47.33 OSA ON CPAP: ICD-10-CM

## 2022-04-14 PROCEDURE — 93000 ELECTROCARDIOGRAM COMPLETE: CPT | Performed by: NURSE PRACTITIONER

## 2022-04-14 PROCEDURE — 99214 OFFICE O/P EST MOD 30 MIN: CPT | Performed by: NURSE PRACTITIONER

## 2022-04-14 RX ORDER — PROPAFENONE HYDROCHLORIDE 325 MG/1
325 CAPSULE, EXTENDED RELEASE ORAL 2 TIMES DAILY
Qty: 180 CAPSULE | Refills: 3 | Status: SHIPPED | OUTPATIENT
Start: 2022-04-14 | End: 2022-06-20

## 2022-04-14 NOTE — PROGRESS NOTES
Saleem Suazo, FNP-BC    Subjective/HPI:     Dinesh Barbosa is a 63 y.o. female is here for routine f/u. She has a PMHx of PAF, HTN and MAYANK. She was admitted to [facility] in 4/2022 for recurrent A fib with RVR. Placed on cardizem gtt and converted to SR. Rhythmol dose was increased and she was discharged. Feeling better since. Tolerating higher dose well, has not felt recurrence of A fib. Paying $600 for 3-month supply of Rhythmol at [facility].    Past Medical History:   Diagnosis Date    Atrial fibrillation ([facility]     Long term current use of anticoagulant therapy     Eliquis    MAYANK (obstructive sleep apnea)        Past Surgical History:   Procedure Laterality Date    HX TUBAL LIGATION         Family History   Problem Relation Age of Onset    Other Other         no known FH of CAD    Hypertension Father     Atrial Fibrillation Father        Social History     Socioeconomic History    Marital status:      Spouse name: Not on file    Number of children: Not on file    Years of education: Not on file    Highest education level: Not on file   Occupational History    Not on file   Tobacco Use    Smoking status: Current Every Day Smoker     Packs/day: 1.00     Years: 35.00     Pack years: 35.00     Types: Cigarettes    Smokeless tobacco: Never Used   Substance and Sexual Activity    Alcohol use: Not Currently    Drug use: No    Sexual activity: Not Currently   Other Topics Concern    Not on file   Social History Narrative    Not on file     Social Determinants of Health     Financial Resource Strain:     Difficulty of Paying Living Expenses: Not on file   Food Insecurity:     Worried About Running Out of Food in the Last Year: Not on file    [place] of Food in the Last Year: Not on file   Transportation Needs:     Lack of Transportation (Medical): Not on file    Lack of Transportation (Non-Medical):  Not on file   Physical Activity:     Days of Exercise per Week: Not on file   [place] of Exercise per Session: Not on file   Stress:     Feeling of Stress : Not on file   Social Connections:     Frequency of Communication with Friends and Family: Not on file    Frequency of Social Gatherings with Friends and Family: Not on file    Attends Faith Services: Not on file    Active Member of Clubs or Organizations: Not on file    Attends Club or Organization Meetings: Not on file    Marital Status: Not on file   Intimate Partner Violence:     Fear of Current or Ex-Partner: Not on file    Emotionally Abused: Not on file    Physically Abused: Not on file    Sexually Abused: Not on file   Housing Stability:     Unable to Pay for Housing in the Last Year: Not on file    Number of Places Lived in the Last Year: Not on file    Unstable Housing in the Last Year: Not on file       Allergies   Allergen Reactions    Beef Containing Products Hives    Pork/Porcine Containing Products Hives    Erythromycin Other (comments)    Macrobid [Nitrofurantoin Monohyd/M-Cryst] Hives    Pcn [Penicillins] Hives       Current Outpatient Medications on File Prior to Visit   Medication Sig Dispense Refill    Eliquis 5 mg tablet TAKE 1 TABLET BY MOUTH TWICE A  Tablet 1    metoprolol succinate (TOPROL-XL) 25 mg XL tablet TAKE 1 TABLET BY MOUTH EVERY NIGHT 90 Tablet 3    [DISCONTINUED] propafenone SR (RYTHMOL SR) 325 mg SR capsule Take 1 Capsule by mouth two (2) times a day. 60 Capsule 3     No current facility-administered medications on file prior to visit. Review of Symptoms:    Review of Systems   Constitutional: Negative for chills, fever and weight loss. HENT: Negative for nosebleeds. Eyes: Negative for blurred vision and double vision. Respiratory: Negative for cough, shortness of breath and wheezing. Cardiovascular: Negative for chest pain, palpitations, orthopnea, leg swelling and PND. Skin: Negative for rash.    Neurological: Negative for dizziness and loss of consciousness. Physical Exam:      General: Well developed, in no acute distress, cooperative and alert  Heart:  reg rate and rhythm; normal S1/S2; no murmurs, no gallops or rubs. Respiratory: Clear bilaterally x 4, no wheezing or rales  Extremities:  Normal cap refill, no cyanosis, atraumatic. No edema. Vascular: 2+ pulses symmetric in all extremities    Vitals:    04/14/22 0921   BP: 132/70   BP 1 Location: Right upper arm   BP Patient Position: Sitting   BP Cuff Size: Large adult   Pulse: 70   Resp: 18   Height: 5' 6\" (1.676 m)   Weight: 239 lb 14.4 oz (108.8 kg)   SpO2: 98%       Lab Results   Component Value Date/Time    Cholesterol, total 154 02/19/2018 10:13 AM    HDL Cholesterol 49 02/19/2018 10:13 AM    LDL, calculated 91 02/19/2018 10:13 AM    VLDL, calculated 14 02/19/2018 10:13 AM    Triglyceride 68 02/19/2018 10:13 AM     Lab Results   Component Value Date/Time    WBC 8.8 04/05/2022 12:15 AM    HGB 14.1 04/05/2022 12:15 AM    HCT 43.4 04/05/2022 12:15 AM    PLATELET 077 04/58/7251 12:15 AM    MCV 97.3 04/05/2022 12:15 AM     Lab Results   Component Value Date/Time    Sodium 142 04/05/2022 12:15 AM    Potassium 4.5 04/05/2022 12:15 AM    Chloride 114 (H) 04/05/2022 12:15 AM    CO2 25 04/05/2022 12:15 AM    Anion gap 3 (L) 04/05/2022 12:15 AM    Glucose 102 (H) 04/05/2022 12:15 AM    BUN 16 04/05/2022 12:15 AM    Creatinine 0.75 04/05/2022 12:15 AM    BUN/Creatinine ratio 21 (H) 04/05/2022 12:15 AM    GFR est AA >60 04/05/2022 12:15 AM    GFR est non-AA >60 04/05/2022 12:15 AM    Calcium 8.7 04/05/2022 12:15 AM    Bilirubin, total 0.4 04/04/2022 03:25 PM    Alk.  phosphatase 93 04/04/2022 03:25 PM    Protein, total 7.8 04/04/2022 03:25 PM    Albumin 3.7 04/04/2022 03:25 PM    Globulin 4.1 (H) 04/04/2022 03:25 PM    A-G Ratio 0.9 (L) 04/04/2022 03:25 PM    ALT (SGPT) 26 04/04/2022 03:25 PM    AST (SGOT) 16 04/04/2022 03:25 PM       ECG done today sinus bradycardia, QTc 394 ms     Assessment: ICD-10-CM ICD-9-CM    1. PAF (paroxysmal atrial fibrillation) (HCC)  I48.0 427.31 AMB POC EKG ROUTINE W/ 12 LEADS, INTER & REP   2. Benign essential HTN  I10 401.1 AMB POC EKG ROUTINE W/ 12 LEADS, INTER & REP   3. MAYANK on CPAP  G47.33 327.23 AMB POC EKG ROUTINE W/ 12 LEADS, INTER & REP    Z99.89 V46.8         Plan:     1. PAF (paroxysmal atrial fibrillation) ([facility] [address] [address]  Started on Rhythmol in 4/2022  EKG done today shows sinus bradycardia,  ms  Continue proprafenone, BB. Provided GoodRx coupon for [employer] pharmacy for lower cost on propafenone. On Eliquis for stroke prevention  Keep fu with Dr. Lizzy Garcia    2. Benign essential HTN  BP controlled. Continue anti-hypertensive therapy and low sodium diet     3.  MAYANK on CPAP  Encouraged nightly CPAP use    F/u with Dr. José Gonzales in 6 months

## 2022-04-14 NOTE — PROGRESS NOTES
1. Have you been to the ER, urgent care clinic since your last visit? Hospitalized since your last visit? Seen on 4/4/22    2. Have you seen or consulted any other health care providers outside of the [address] [place] [address] since your last visit? Include any pap smears or colon screening.    No.      Chief Complaint   Patient presents with    Irregular Heart Beat     increase in Rythmol

## 2022-04-21 ENCOUNTER — OFFICE VISIT (OUTPATIENT)
Dept: CARDIOLOGY CLINIC | Age: 62
End: 2022-04-21
Payer: COMMERCIAL

## 2022-04-21 VITALS
SYSTOLIC BLOOD PRESSURE: 132 MMHG | WEIGHT: 241.3 LBS | BODY MASS INDEX: 38.78 KG/M2 | RESPIRATION RATE: 18 BRPM | HEIGHT: 66 IN | DIASTOLIC BLOOD PRESSURE: 74 MMHG | HEART RATE: 68 BPM | OXYGEN SATURATION: 99 %

## 2022-04-21 DIAGNOSIS — G47.33 OSA ON CPAP: ICD-10-CM

## 2022-04-21 DIAGNOSIS — Z99.89 OSA ON CPAP: ICD-10-CM

## 2022-04-21 DIAGNOSIS — I48.0 PAROXYSMAL ATRIAL FIBRILLATION (HCC): ICD-10-CM

## 2022-04-21 DIAGNOSIS — I48.92 PAROXYSMAL ATRIAL FLUTTER (HCC): Primary | ICD-10-CM

## 2022-04-21 PROCEDURE — 99215 OFFICE O/P EST HI 40 MIN: CPT | Performed by: INTERNAL MEDICINE

## 2022-04-21 NOTE — PROGRESS NOTES
Chief Complaint   Patient presents with    New Patient     Referred by Dr Julia Patel for a fib     Palpitations     starts out with flutters than racing heart      1. Have you been to the ER, urgent care clinic since your last visit? Hospitalized since your last visit? No     2. Have you seen or consulted any other health care providers outside of the [address] [place] [address] since your last visit? Include any pap smears or colon screening.   No

## 2022-04-21 NOTE — PROGRESS NOTES
Subjective:      María Cortez is a 63 y.o. female is here for EP consult. She has episodes of palpitations. Was started on propafenone sp af and organized into afl. AAD is extremely expensive. She was referred for discussion of ablation. Patient Active Problem List    Diagnosis Date Noted    Paroxysmal atrial flutter ([address] 04/05/2022    AF (atrial fibrillation) ([address] 04/04/2022    Severe obesity ([address] 07/10/2020    MAYANK on CPAP 10/17/2019    Atrial fibrillation ([address] 03/04/2018    Irregular heart beat 12/07/2015    PAF [facility]oxysmal [facility] fibrillation) [facility] 11/28/2015    Lower urinary tract infectious disease 11/28/2015    Allergic drug rash due to sulfonamide 11/28/2015      None  Past Medical History:   Diagnosis Date    Atrial fibrillation ([address]     Long term current use of anticoagulant therapy     Eliquis    MAYANK (obstructive sleep apnea)       Past Surgical History:   Procedure Laterality Date    HX TUBAL LIGATION       Allergies   Allergen Reactions    Beef Containing Products Hives    Pork/Porcine Containing Products Hives    Erythromycin Other (comments)    Macrobid [Nitrofurantoin Monohyd/M-Cryst] Hives    Pcn [Penicillins] Hives      Family History   Problem Relation Age of Onset    Other Other         no known FH of CAD    Hypertension Father     Atrial Fibrillation Father     negative for cardiac disease  Social History     Socioeconomic History    Marital status:    Tobacco Use    Smoking status: Current Every Day Smoker     Packs/day: 1.00     Years: 35.00     Pack years: 35.00     Types: Cigarettes    Smokeless tobacco: Never Used   Vaping Use    Vaping Use: Never used   Substance and Sexual Activity    Alcohol use: Not Currently    Drug use: No    Sexual activity: Not Currently     Current Outpatient Medications   Medication Sig    propafenone SR (RYTHMOL SR) 325 mg SR capsule Take 1 Capsule by mouth two (2) times a day.     Eliquis 5 mg tablet TAKE 1 TABLET BY MOUTH TWICE A DAY    metoprolol succinate (TOPROL-XL) 25 mg XL tablet TAKE 1 TABLET BY MOUTH EVERY NIGHT     No current facility-administered medications for this visit. Vitals:    04/21/22 1001   BP: 132/74   Pulse: 68   Resp: 18   SpO2: 99%   Weight: 241 lb 4.8 oz (109.5 kg)   Height: 5' 6\" (1.676 m)       I have reviewed the nurses notes, vitals, problem list, allergy list, medical history, family, social history and medications. Review of Symptoms:    General: Pt denies excessive weight gain or loss. Pt is able to conduct ADL's  HEENT: Denies blurred vision, headaches, hearing loss, epistaxis and difficulty swallowing. Respiratory: Denies cough, congestion, shortness of breath, NIETO, wheezing or stridor. Cardiovascular: +palpitations, Denies precordial pain,  edema or PND  Gastrointestinal: Denies poor appetite, indigestion, abdominal pain or blood in stool  Genitourinary: Denies hematuria, dysuria, increased urinary frequency  Musculoskeletal: Denies joint pain or swelling from muscles or joints  Neurologic: Denies tremor, paresthesias, headache, or sensory motor disturbance  Psychiatric: Denies confusion, insomnia, depression  Integumentray: Denies rash, itching or ulcers. Hematologic: Denies easy bruising, bleeding    Physical Exam:      General: Well developed, in no acute distress. HEENT: Eyes - PERRL, no jvd  Heart:  Normal S1/S2 negative S3 or S4. Regular, no murmur, gallop or rub. Respiratory: Clear bilaterally x 4, no wheezing or rales  Abdomen:   Soft, non-tender, bowel sounds are active. Extremities:  No edema, normal cap refill, no cyanosis. Musculoskeletal: No clubbing  Neuro: A&Ox3, speech clear, gait stable. Skin: Skin color is normal. No rashes or lesions.  Non diaphoretic, no ulcers or subcutaneous nodule  Vascular: 2+ pulses symmetric in all extremities  Psych - judgement intact and orientation is wnl     Cardiographics    Ekg: nsr    Results for orders placed or performed during the hospital encounter of 04/04/22   EKG, 12 LEAD, INITIAL   Result Value Ref Range    Ventricular Rate 150 BPM    Atrial Rate 250 BPM    QRS Duration 80 ms    Q-T Interval 320 ms    QTC Calculation (Bezet) 505 ms    Calculated T Axis 168 degrees    Diagnosis       Atrial fibrillation Atrial flutter  with rapid ventricular response  Non-specific ST & T wave changes  Confirmed by Edris Schilder ([id number]) on 4/5/2022 10:02:12 PM           Lab Results   Component Value Date/Time    WBC 8.8 04/05/2022 12:15 AM    HGB 14.1 04/05/2022 12:15 AM    HCT 43.4 04/05/2022 12:15 AM    PLATELET 124 82/28/9362 12:15 AM    MCV 97.3 04/05/2022 12:15 AM      Lab Results   Component Value Date/Time    Sodium 142 04/05/2022 12:15 AM    Potassium 4.5 04/05/2022 12:15 AM    Chloride 114 (H) 04/05/2022 12:15 AM    CO2 25 04/05/2022 12:15 AM    Anion gap 3 (L) 04/05/2022 12:15 AM    Glucose 102 (H) 04/05/2022 12:15 AM    BUN 16 04/05/2022 12:15 AM    Creatinine 0.75 04/05/2022 12:15 AM    BUN/Creatinine ratio 21 (H) 04/05/2022 12:15 AM    GFR est AA >60 04/05/2022 12:15 AM    GFR est non-AA >60 04/05/2022 12:15 AM    Calcium 8.7 04/05/2022 12:15 AM    Bilirubin, total 0.4 04/04/2022 03:25 PM    Alk. phosphatase 93 04/04/2022 03:25 PM    Protein, total 7.8 04/04/2022 03:25 PM    Albumin 3.7 04/04/2022 03:25 PM    Globulin 4.1 (H) 04/04/2022 03:25 PM    A-G Ratio 0.9 (L) 04/04/2022 03:25 PM    ALT (SGPT) 26 04/04/2022 03:25 PM         Assessment:     Assessment:        ICD-10-CM ICD-9-CM    1. Paroxysmal atrial flutter (HCC)  I48.92 427.32 CBC WITH AUTOMATED DIFF      PROTHROMBIN TIME + INR      METABOLIC PANEL, COMPREHENSIVE      XR CHEST PA LAT      CBC WITH AUTOMATED DIFF      PROTHROMBIN TIME + INR      METABOLIC PANEL, COMPREHENSIVE   2.  Paroxysmal atrial fibrillation (HCC)  I48.0 427.31 CBC WITH AUTOMATED DIFF      PROTHROMBIN TIME + INR      METABOLIC PANEL, COMPREHENSIVE      XR CHEST PA LAT      CBC WITH AUTOMATED DIFF      PROTHROMBIN TIME + INR      METABOLIC PANEL, COMPREHENSIVE   3. MAYANK on CPAP  G47.33 327.23 CBC WITH AUTOMATED DIFF    Z99.89 V46.8 PROTHROMBIN TIME + INR      METABOLIC PANEL, COMPREHENSIVE      XR CHEST PA LAT      CBC WITH AUTOMATED DIFF      PROTHROMBIN TIME + INR      METABOLIC PANEL, COMPREHENSIVE     Orders Placed This Encounter    XR CHEST PA LAT     Standing Status:   Future     Standing Expiration Date:   5/21/2023     Order Specific Question:   Reason for Exam     Answer:   pre op    CBC WITH AUTOMATED DIFF     Standing Status:   Future     Number of Occurrences:   1     Standing Expiration Date:   10/21/2022    PROTHROMBIN TIME + INR     Standing Status:   Future     Number of Occurrences:   1     Standing Expiration Date:   34/69/0391    METABOLIC PANEL, COMPREHENSIVE     Standing Status:   Future     Number of Occurrences:   1     Standing Expiration Date:   10/21/2022        Plan:   Jason Guillaume is a pleasant lady with AF/AFL (on propafenone). Stable and compliant with oac. Symptomatic with her AF episodes. Jason Guillaume is a candidate for an afib ablation. I discussed the risks/benefits/alternatives of the procedure with the patient. Risks include (but are not limited to) bleeding, heart block, infection, cva/mi/tamponade/esophageal perforation/pv stenosis/death. The patient understands that there is a 8-1% major complication rate and agrees to proceed. Thank you for this interesting consultation. Thank you for allowing me to participate in Jason Guillaume 's care.     Claudia Owusu MD, Niecy Pepper

## 2022-05-15 RX ORDER — APIXABAN 5 MG/1
TABLET, FILM COATED ORAL
Qty: 180 TABLET | Refills: 1 | Status: SHIPPED | OUTPATIENT
Start: 2022-05-15 | End: 2022-07-28

## 2022-05-26 ENCOUNTER — HOSPITAL ENCOUNTER (OUTPATIENT)
Dept: GENERAL RADIOLOGY | Age: 62
Discharge: HOME | End: 2022-05-26
Payer: COMMERCIAL

## 2022-05-26 DIAGNOSIS — Z99.89 OSA ON CPAP: ICD-10-CM

## 2022-05-26 DIAGNOSIS — I48.92 PAROXYSMAL ATRIAL FLUTTER (HCC): ICD-10-CM

## 2022-05-26 DIAGNOSIS — I48.0 PAROXYSMAL ATRIAL FIBRILLATION (HCC): ICD-10-CM

## 2022-05-26 DIAGNOSIS — G47.33 OSA ON CPAP: ICD-10-CM

## 2022-05-26 PROCEDURE — 71046 X-RAY EXAM CHEST 2 VIEWS: CPT

## 2022-05-27 LAB
ALBUMIN SERPL-MCNC: 4.6 G/DL (ref 3.8–4.9)
ALBUMIN/GLOB SERPL: 2.1 {RATIO} (ref 1.2–2.2)
ALP SERPL-CCNC: 95 IU/L (ref 44–121)
ALT SERPL-CCNC: 16 IU/L (ref 0–32)
AST SERPL-CCNC: 15 IU/L (ref 0–40)
BASOPHILS # BLD AUTO: 0 X10E3/UL (ref 0–0.2)
BASOPHILS NFR BLD AUTO: 1 %
BILIRUB SERPL-MCNC: 0.5 MG/DL (ref 0–1.2)
BUN SERPL-MCNC: 13 MG/DL (ref 8–27)
BUN/CREAT SERPL: 14 (ref 12–28)
CALCIUM SERPL-MCNC: 9.8 MG/DL (ref 8.7–10.3)
CHLORIDE SERPL-SCNC: 103 MMOL/L (ref 96–106)
CO2 SERPL-SCNC: 20 MMOL/L (ref 20–29)
CREAT SERPL-MCNC: 0.93 MG/DL (ref 0.57–1)
EGFR: 70 ML/MIN/1.73
EOSINOPHIL # BLD AUTO: 0.2 X10E3/UL (ref 0–0.4)
EOSINOPHIL NFR BLD AUTO: 2 %
ERYTHROCYTE [DISTWIDTH] IN BLOOD BY AUTOMATED COUNT: 12.4 % (ref 11.7–15.4)
GLOBULIN SER CALC-MCNC: 2.2 G/DL (ref 1.5–4.5)
GLUCOSE SERPL-MCNC: 100 MG/DL (ref 65–99)
HCT VFR BLD AUTO: 47.6 % (ref 34–46.6)
HGB BLD-MCNC: 16 G/DL (ref 11.1–15.9)
IMM GRANULOCYTES # BLD AUTO: 0 X10E3/UL (ref 0–0.1)
IMM GRANULOCYTES NFR BLD AUTO: 0 %
INR PPP: 1 (ref 0.9–1.2)
LYMPHOCYTES # BLD AUTO: 1.7 X10E3/UL (ref 0.7–3.1)
LYMPHOCYTES NFR BLD AUTO: 22 %
MCH RBC QN AUTO: 31.4 PG (ref 26.6–33)
MCHC RBC AUTO-ENTMCNC: 33.6 G/DL (ref 31.5–35.7)
MCV RBC AUTO: 93 FL (ref 79–97)
MONOCYTES # BLD AUTO: 0.6 X10E3/UL (ref 0.1–0.9)
MONOCYTES NFR BLD AUTO: 8 %
NEUTROPHILS # BLD AUTO: 5 X10E3/UL (ref 1.4–7)
NEUTROPHILS NFR BLD AUTO: 67 %
PLATELET # BLD AUTO: 247 X10E3/UL (ref 150–450)
POTASSIUM SERPL-SCNC: 4.7 MMOL/L (ref 3.5–5.2)
PROT SERPL-MCNC: 6.8 G/DL (ref 6–8.5)
PROTHROMBIN TIME: 10.6 SEC (ref 9.1–12)
RBC # BLD AUTO: 5.1 X10E6/UL (ref 3.77–5.28)
SODIUM SERPL-SCNC: 141 MMOL/L (ref 134–144)
WBC # BLD AUTO: 7.5 X10E3/UL (ref 3.4–10.8)

## 2022-06-06 ENCOUNTER — APPOINTMENT (OUTPATIENT)
Dept: CARDIAC CATH/INVASIVE PROCEDURES | Age: 62
End: 2022-06-06
Attending: INTERNAL MEDICINE
Payer: COMMERCIAL

## 2022-06-18 ENCOUNTER — ANESTHESIA EVENT (OUTPATIENT)
Dept: CARDIAC CATH/INVASIVE PROCEDURES | Age: 62
End: 2022-06-18
Payer: COMMERCIAL

## 2022-06-18 NOTE — ANESTHESIA PREPROCEDURE EVALUATION
Relevant Problems   RESPIRATORY SYSTEM   (+) MAYANK on CPAP      CARDIOVASCULAR   (+) AF (atrial fibrillation) (HCC)   (+) Atrial fibrillation (HCC)   (+) PAF (paroxysmal atrial fibrillation) (HCC)   (+) Paroxysmal atrial flutter (HCC)      ENDOCRINE   (+) Severe obesity (HCC)       Anesthetic History   No history of anesthetic complications            Review of Systems / Medical History  Patient summary reviewed, nursing notes reviewed and pertinent labs reviewed    Pulmonary  Within defined limits      Sleep apnea: CPAP           Neuro/Psych   Within defined limits           Cardiovascular  Within defined limits          Dysrhythmias : atrial fibrillation      Exercise tolerance: <4 METS     GI/Hepatic/Renal  Within defined limits              Endo/Other  Within defined limits      Obesity and morbid obesity     Other Findings            Physical Exam    Airway  Mallampati: II  TM Distance: > 6 cm  Neck ROM: normal range of motion   Mouth opening: Normal     Cardiovascular  Regular rate and rhythm,  S1 and S2 normal,  no murmur, click, rub, or gallop             Dental  No notable dental hx       Pulmonary  Breath sounds clear to auscultation               Abdominal  GI exam deferred       Other Findings            Anesthetic Plan    ASA: 2  Anesthesia type: general    Monitoring Plan: BIS      Induction: Intravenous  Anesthetic plan and risks discussed with: Patient

## 2022-06-20 ENCOUNTER — APPOINTMENT (OUTPATIENT)
Dept: CARDIAC CATH/INVASIVE PROCEDURES | Age: 62
End: 2022-06-20
Attending: INTERNAL MEDICINE
Payer: COMMERCIAL

## 2022-06-20 ENCOUNTER — ANESTHESIA (OUTPATIENT)
Dept: CARDIAC CATH/INVASIVE PROCEDURES | Age: 62
End: 2022-06-20
Payer: COMMERCIAL

## 2022-06-20 ENCOUNTER — HOSPITAL ENCOUNTER (OUTPATIENT)
Age: 62
Discharge: HOME | End: 2022-06-20
Attending: INTERNAL MEDICINE | Admitting: INTERNAL MEDICINE
Payer: COMMERCIAL

## 2022-06-20 VITALS
HEIGHT: 66 IN | HEART RATE: 108 BPM | RESPIRATION RATE: 21 BRPM | SYSTOLIC BLOOD PRESSURE: 112 MMHG | TEMPERATURE: 97.7 F | WEIGHT: 232 LBS | OXYGEN SATURATION: 95 % | BODY MASS INDEX: 37.28 KG/M2 | DIASTOLIC BLOOD PRESSURE: 68 MMHG

## 2022-06-20 DIAGNOSIS — I48.91 ATRIAL FIBRILLATION, UNSPECIFIED TYPE (HCC): ICD-10-CM

## 2022-06-20 LAB — ACT BLD: 387 SECS (ref 79–138)

## 2022-06-20 PROCEDURE — 74011250637 HC RX REV CODE- 250/637: Performed by: INTERNAL MEDICINE

## 2022-06-20 PROCEDURE — C1730 CATH, EP, 19 OR FEW ELECT: HCPCS | Performed by: INTERNAL MEDICINE

## 2022-06-20 PROCEDURE — 77030029163 HC CBL EP DX ACHV DISP MEDT -C: Performed by: INTERNAL MEDICINE

## 2022-06-20 PROCEDURE — 77030008543 HC TBNG MON PRSS MRTM -A: Performed by: INTERNAL MEDICINE

## 2022-06-20 PROCEDURE — 74011000250 HC RX REV CODE- 250: Performed by: INTERNAL MEDICINE

## 2022-06-20 PROCEDURE — 77030016894 HC CBL EP DX CATH3 STJU -B: Performed by: INTERNAL MEDICINE

## 2022-06-20 PROCEDURE — 77030030278 HC COAX UMB DISP MEDT -C: Performed by: INTERNAL MEDICINE

## 2022-06-20 PROCEDURE — 77030013797 HC KT TRNSDUC PRSSR EDWD -A: Performed by: INTERNAL MEDICINE

## 2022-06-20 PROCEDURE — 93621 COMP EP EVL L PAC&REC C SINS: CPT | Performed by: INTERNAL MEDICINE

## 2022-06-20 PROCEDURE — 74011000258 HC RX REV CODE- 258: Performed by: ANESTHESIOLOGY

## 2022-06-20 PROCEDURE — C1733 CATH, EP, OTHR THAN COOL-TIP: HCPCS | Performed by: INTERNAL MEDICINE

## 2022-06-20 PROCEDURE — 77030015398 HC CBL EP EXT STJU -C: Performed by: INTERNAL MEDICINE

## 2022-06-20 PROCEDURE — 77030008684 HC TU ET CUF COVD -B: Performed by: ANESTHESIOLOGY

## 2022-06-20 PROCEDURE — C1894 INTRO/SHEATH, NON-LASER: HCPCS | Performed by: INTERNAL MEDICINE

## 2022-06-20 PROCEDURE — 77030033352 HC TBNG IRR PMP COOL PNT STJU -B: Performed by: INTERNAL MEDICINE

## 2022-06-20 PROCEDURE — 77030010880 HC CBL EP SUPRME STJU -C: Performed by: INTERNAL MEDICINE

## 2022-06-20 PROCEDURE — 77030026438 HC STYL ET INTUB CARD -A: Performed by: ANESTHESIOLOGY

## 2022-06-20 PROCEDURE — 77030018729 HC ELECTRD DEFIB PAD CARD -B: Performed by: INTERNAL MEDICINE

## 2022-06-20 PROCEDURE — C1769 GUIDE WIRE: HCPCS | Performed by: INTERNAL MEDICINE

## 2022-06-20 PROCEDURE — 74011250636 HC RX REV CODE- 250/636: Performed by: ANESTHESIOLOGY

## 2022-06-20 PROCEDURE — 93657 TX L/R ATRIAL FIB ADDL: CPT | Performed by: INTERNAL MEDICINE

## 2022-06-20 PROCEDURE — 77030030261 HC CBL UMB ELEC DISP MEDT -C: Performed by: INTERNAL MEDICINE

## 2022-06-20 PROCEDURE — 74011250636 HC RX REV CODE- 250/636: Performed by: INTERNAL MEDICINE

## 2022-06-20 PROCEDURE — C1759 CATH, INTRA ECHOCARDIOGRAPHY: HCPCS | Performed by: INTERNAL MEDICINE

## 2022-06-20 PROCEDURE — 76210000006 HC OR PH I REC 0.5 TO 1 HR

## 2022-06-20 PROCEDURE — 77030029359 HC PRB ESOPH TEMP CATH ANTM -F: Performed by: INTERNAL MEDICINE

## 2022-06-20 PROCEDURE — 2709999900 HC NON-CHARGEABLE SUPPLY: Performed by: INTERNAL MEDICINE

## 2022-06-20 PROCEDURE — 93656 COMPRE EP EVAL ABLTJ ATR FIB: CPT | Performed by: INTERNAL MEDICINE

## 2022-06-20 PROCEDURE — 77030018733 HC ELECTRD KT ENSITE STJU -G: Performed by: INTERNAL MEDICINE

## 2022-06-20 PROCEDURE — 76060000034 HC ANESTHESIA 1.5 TO 2 HR: Performed by: INTERNAL MEDICINE

## 2022-06-20 PROCEDURE — 85347 COAGULATION TIME ACTIVATED: CPT

## 2022-06-20 RX ORDER — HYDROCODONE BITARTRATE AND ACETAMINOPHEN 5; 325 MG/1; MG/1
1 TABLET ORAL
Status: DISCONTINUED | OUTPATIENT
Start: 2022-06-20 | End: 2022-06-20 | Stop reason: HOSPADM

## 2022-06-20 RX ORDER — FENTANYL CITRATE 50 UG/ML
INJECTION, SOLUTION INTRAMUSCULAR; INTRAVENOUS AS NEEDED
Status: DISCONTINUED | OUTPATIENT
Start: 2022-06-20 | End: 2022-06-20 | Stop reason: HOSPADM

## 2022-06-20 RX ORDER — SODIUM CHLORIDE 0.9 % (FLUSH) 0.9 %
5-40 SYRINGE (ML) INJECTION AS NEEDED
Status: DISCONTINUED | OUTPATIENT
Start: 2022-06-20 | End: 2022-06-20 | Stop reason: HOSPADM

## 2022-06-20 RX ORDER — ONDANSETRON 2 MG/ML
INJECTION INTRAMUSCULAR; INTRAVENOUS AS NEEDED
Status: DISCONTINUED | OUTPATIENT
Start: 2022-06-20 | End: 2022-06-20 | Stop reason: HOSPADM

## 2022-06-20 RX ORDER — ACETAMINOPHEN 325 MG/1
650 TABLET ORAL
Status: DISCONTINUED | OUTPATIENT
Start: 2022-06-20 | End: 2022-06-20 | Stop reason: HOSPADM

## 2022-06-20 RX ORDER — SODIUM CHLORIDE 0.9 % (FLUSH) 0.9 %
5-40 SYRINGE (ML) INJECTION EVERY 8 HOURS
Status: DISCONTINUED | OUTPATIENT
Start: 2022-06-20 | End: 2022-06-20 | Stop reason: HOSPADM

## 2022-06-20 RX ORDER — PHENYLEPHRINE HCL IN 0.9% NACL 0.4MG/10ML
SYRINGE (ML) INTRAVENOUS AS NEEDED
Status: DISCONTINUED | OUTPATIENT
Start: 2022-06-20 | End: 2022-06-20 | Stop reason: HOSPADM

## 2022-06-20 RX ORDER — MIDAZOLAM HYDROCHLORIDE 1 MG/ML
INJECTION, SOLUTION INTRAMUSCULAR; INTRAVENOUS AS NEEDED
Status: DISCONTINUED | OUTPATIENT
Start: 2022-06-20 | End: 2022-06-20 | Stop reason: HOSPADM

## 2022-06-20 RX ORDER — SODIUM CHLORIDE 9 MG/ML
INJECTION, SOLUTION INTRAVENOUS
Status: DISCONTINUED | OUTPATIENT
Start: 2022-06-20 | End: 2022-06-20 | Stop reason: HOSPADM

## 2022-06-20 RX ORDER — DEXAMETHASONE SODIUM PHOSPHATE 4 MG/ML
INJECTION, SOLUTION INTRA-ARTICULAR; INTRALESIONAL; INTRAMUSCULAR; INTRAVENOUS; SOFT TISSUE AS NEEDED
Status: DISCONTINUED | OUTPATIENT
Start: 2022-06-20 | End: 2022-06-20 | Stop reason: HOSPADM

## 2022-06-20 RX ORDER — SUCCINYLCHOLINE CHLORIDE 20 MG/ML
INJECTION INTRAMUSCULAR; INTRAVENOUS AS NEEDED
Status: DISCONTINUED | OUTPATIENT
Start: 2022-06-20 | End: 2022-06-20 | Stop reason: HOSPADM

## 2022-06-20 RX ORDER — PROPOFOL 10 MG/ML
INJECTION, EMULSION INTRAVENOUS AS NEEDED
Status: DISCONTINUED | OUTPATIENT
Start: 2022-06-20 | End: 2022-06-20 | Stop reason: HOSPADM

## 2022-06-20 RX ADMIN — FENTANYL CITRATE 50 MCG: 50 INJECTION, SOLUTION INTRAMUSCULAR; INTRAVENOUS at 11:48

## 2022-06-20 RX ADMIN — MIDAZOLAM HYDROCHLORIDE 2 MG: 1 INJECTION, SOLUTION INTRAMUSCULAR; INTRAVENOUS at 11:14

## 2022-06-20 RX ADMIN — Medication 80 MCG: at 11:40

## 2022-06-20 RX ADMIN — ONDANSETRON HYDROCHLORIDE 4 MG: 2 INJECTION, SOLUTION INTRAMUSCULAR; INTRAVENOUS at 11:14

## 2022-06-20 RX ADMIN — FENTANYL CITRATE 50 MCG: 50 INJECTION, SOLUTION INTRAMUSCULAR; INTRAVENOUS at 11:14

## 2022-06-20 RX ADMIN — DEXAMETHASONE SODIUM PHOSPHATE 8 MG: 4 INJECTION, SOLUTION INTRAMUSCULAR; INTRAVENOUS at 11:40

## 2022-06-20 RX ADMIN — PROPOFOL 50 MG: 10 INJECTION, EMULSION INTRAVENOUS at 11:35

## 2022-06-20 RX ADMIN — PROPOFOL 150 MG: 10 INJECTION, EMULSION INTRAVENOUS at 11:17

## 2022-06-20 RX ADMIN — SODIUM CHLORIDE: 9 INJECTION, SOLUTION INTRAVENOUS at 11:08

## 2022-06-20 RX ADMIN — Medication 80 MCG: at 12:07

## 2022-06-20 RX ADMIN — BIVALIRUDIN 78.9 MG: 250 INJECTION, POWDER, LYOPHILIZED, FOR SOLUTION INTRAVENOUS at 11:38

## 2022-06-20 RX ADMIN — SUCCINYLCHOLINE CHLORIDE 140 MG: 20 INJECTION, SOLUTION INTRAMUSCULAR; INTRAVENOUS at 11:17

## 2022-06-20 RX ADMIN — HYDROCODONE BITARTRATE AND ACETAMINOPHEN 1 TABLET: 5; 325 TABLET ORAL at 14:11

## 2022-06-20 NOTE — Clinical Note
Sheath #1: sheath exchanged. Hemostasis achieved.  FlexCath sheath removed/ 16fr sheath advanced RFV/aspirated and flushed

## 2022-06-20 NOTE — DISCHARGE INSTRUCTIONS
[facility]  [address]  [phone]  [place]. [place] [address] [address] INSTRUCTIONS    Patient ID:  Farooq Dubois  [id number]  80 y.o.  1960    Admit Date: 6/20/2022    Discharge Date: 6/20/2022     Admitting Physician: Danyelle Callahan MD     Discharge Physician: Danyelle Callahan MD    Admission Diagnoses:   Atrial fibrillation, unspecified type ([address] [I48.91]  Afib ([address] [I48.91]    Discharge Diagnoses: Active Problems:    Afib ([address] (6/20/2022)        Discharge Condition: Good    Cardiology Procedures this Admission:  Atrial fibrillation. Disposition: home    Reference discharge instructions provided by nursing for diet and activity. Follow-up with Dr Shanita Leach or his Nurse Practitioners in 1-2 weeks. Call [phone] to make an appointment. Signed:  Danyelle Callahan MD  6/20/2022  12:19 PM    S/P ATRIAL FIBRILLATION ABLATION DISCHARGE INSTRUCTIONS    It is normal to feel tired the first couple days. Take it easy and follow the physicians instructions. CHECK THE CATHETER INSERTION SITE DAILY:  You may shower 24 hours after the procedure, remove the bandage during showering. Wash with soap and water and pat dry. Gentle cleaning of the site with soap and water is sufficient, cover with a dry clean dressing or bandage. Do not apply creams or powders to the area. Do not sit in a bathtub or pool of water for 7 days or until wound has completely healed. Temporary bruising and discomfort is normal and may last a few weeks. You may have a  formation of a small lump at the site which may last up to 6 weeks. CALL THE PHYSICIAN:  If the site becomes red, swollen or feels warm to the touch  If there is bleeding or drainage or if there is unusual pain at the groin or down the leg.   If there is any bleeding, lie down, apply pressure or have someone apply pressure with a clean cloth until the bleeding stops. If the bleeding continues, call 911 to be transported to the hospital.  DO NOT DRIVE YOURSELF, Angela Gustafson. Activity:      For the first 24-48 hours or as instructed by the physician:  No lifting, pushing or pulling over 5 pounds and no straining the insertion site. Do not lift grocery bags or the garbage can, do not run the vacuum  or  for 7 days. Start with short walks as in the hospital and gradually increase as tolerated each day. It is recommended to walk 30 minutes 5-7 days per week. Follow your physicians instructions on activity. Avoid walking outside in extremes of heat or cold. Walk inside when it is cold and windy or hot and humid. Things to keep in mind:  No driving for at least 5 days or as designated by your physician. Limit the number of times you go up and down the stairs  Take rests and pace yourself with activity. Be careful and do not strain with bowel movements. Medications: Take all medications as prescribed  Call your physician if you have any questions  Keep an updated list of your medications with you at all times and give a list to your physician and pharmacist    Signs and Symptoms:  Be cautious of symptoms of angina or recurrent symptoms such as chest discomfort, unusual shortness of breath or fatigue, palpitations. After Care: Follow up with your physician as instructed. Follow a heart healthy diet with proper portion control, daily stress management, daily exercise, blood pressure and cholesterol control , and smoking cessation.

## 2022-06-20 NOTE — Clinical Note
A venogram was performed on the Other (document in comment). Injected with single hand injection. Injection volume  = 5 mL.  LI

## 2022-06-20 NOTE — PROGRESS NOTES
SHEATH PULL NOTE:    Patient informed of procedure with questions answered with review. Sheath site prepped with Chloraprep swab. 7 fr and 11 fr sheath in lfv pulled by BHARATHI Banks RN. Hand hold and quick clot, with manual compression to site. No bleeding, no hematoma, no pain at site. Hemostasis obtained with hand hold/manual compression at site. Patient tolerated well. No change in status. Handhold for 15 minutes. No change at site. Occlusive dressing applied to site. No bleeding, no hematoma, no pain/discomfort at site. Groin instructions provided with review. Continue to monitor procedure site and patient status. *Advised patient to keep head flat and extremity flat to decrease risk of bleeding. *Recommended that patient not drink for ONE HOUR post sheath pull completion. *Recommended that patient not eat for TWO HOURS post sheath pull completion. *Instructed patient on rationale for delay of PO products to decrease risk for aspiration and if additional treatment to procedure site is required. Patient verbalized understanding of instructions with review.

## 2022-06-20 NOTE — PROGRESS NOTES
Cardiac Cath Lab Recovery Arrival Note:      Katrina Torres arrived to Cardiac Cath Lab, Recovery Area. Staff introduced to patient. Patient identifiers verified with NAME and DATE OF BIRTH. Procedure verified with patient. Consent forms reviewed and signed by patient or authorized representative and verified. Allergies verified. Patient and family oriented to department. Patient and family informed of procedure and plan of care. Questions answered with review. Patient prepped for procedure, per orders from physician, prior to arrival.    Patient on cardiac monitor, non-invasive blood pressure, SPO2 monitor. On RA. Patient is A&Ox 4. Patient reports no complaints. Primary Nurse Marli Lara RN and KEVIN Angela RN performed a dual skin assessment on this patient No impairment noted  Champ score is 23, refused mepilex to sacrum/coccyx       Patient in stretcher, in low position, with side rails up, call bell within reach, patient instructed to call if assistance as needed. Patient prep in: [address], [place] [address]. Patient family has pager #   Family in: Lecharlie, son in waiting room.    Prep by: Emanuel Dey RN's

## 2022-06-20 NOTE — Clinical Note
Transseptal Cath Performed under hemodynamic and ICE and Fluoro, RF VersaCross wire/ Deloras Thuy used

## 2022-06-20 NOTE — Clinical Note
A venogram was performed on the Other (document in comment). Injected with single hand injection. Injection volume  = 5 mL.  LS

## 2022-06-20 NOTE — PERIOP NOTES
[address] from Operating Room to PACU    Report received from [address] and [address] CRNA regarding Michel Fontanez. Surgeon(s):  Anesthesia, Del Ji MD  And Procedure(s) (LRB):  SPECIAL PROCEDURE OUTSIDE OF OR (N/A)  confirmed   with allergies and dressings (sheaths) discussed. Anesthesia type, drugs, patient history, complications, estimated blood loss, vital signs, intake and output, and last pain medication, lines and temperature were reviewed. 1340 TRANSFER - OUT REPORT:    Verbal report given to [address] RN(name) on Michel Fontanez  being transferred to [facility]) for routine post - op       Report consisted of patients Situation, Background, Assessment and   Recommendations(SBAR). Information from the following report(s) SBAR, Kardex, Procedure Summary, Intake/Output, MAR and Cardiac Rhythm SR was reviewed with the receiving nurse. Lines:   Peripheral IV 06/20/22 Left Antecubital (Active)   Site Assessment Clean, dry, & intact 06/20/22 1304   Phlebitis Assessment 0 06/20/22 1304   Infiltration Assessment 0 06/20/22 1304   Dressing Status Clean, dry, & intact 06/20/22 1304   Dressing Type Transparent;Tape 06/20/22 1304   Hub Color/Line Status Pink; Infusing 06/20/22 1304        Opportunity for questions and clarification was provided.       Patient transported with:   Monitor  Registered Nurse  Tech

## 2022-06-20 NOTE — Clinical Note
Sheath #1: Sheath: inserted. Sheath inserted/placed in the right femoral  vein. Hemostasis achieved.  8fr sheath RFV removed/ VersaCross access sheath advanced over package wire/ aspirated and flushed

## 2022-06-20 NOTE — ANESTHESIA POSTPROCEDURE EVALUATION
Procedure(s):  ABLATION A-FIB  W COMPLETE EP STUDY  ABLATION FOLLOWING A-FIB  ADDL. general    Anesthesia Post Evaluation      Multimodal analgesia: multimodal analgesia used between 6 hours prior to anesthesia start to PACU discharge  Patient location during evaluation: bedside  Patient participation: complete - patient participated  Level of consciousness: awake  Pain management: adequate  Airway patency: patent  Anesthetic complications: no  Cardiovascular status: acceptable  Respiratory status: acceptable  Hydration status: acceptable  Post anesthesia nausea and vomiting:  controlled  Final Post Anesthesia Temperature Assessment:  Normothermia (36.0-37.5 degrees C)      INITIAL Post-op Vital signs:   Vitals Value Taken Time   /56 06/20/22 1358   Temp 36.4 °C (97.5 °F) 06/20/22 1330   Pulse 76 06/20/22 1405   Resp 24 06/20/22 1405   SpO2 96 % 06/20/22 1405   Vitals shown include unvalidated device data.

## 2022-06-20 NOTE — Clinical Note
TRANSFER - IN REPORT:     Verbal report received from: [facility] Recovery Area. Report consisted of patient's Situation, Background, Assessment and   Recommendations(SBAR). Opportunity for questions and clarification was provided. Assessment completed upon patient's arrival to unit and care assumed. Patient transported with a Registered Nurse.  dentures and glasses removed/ left in [facility] Recovery Area

## 2022-06-20 NOTE — Clinical Note
TRANSFER - OUT REPORT:     Verbal report given to: PATRICK Chavis, (at bedside). Report consisted of patient's Situation, Background, Assessment and   Recommendations(SBAR). Opportunity for questions and clarification was provided. Patient transported with a Registered Nurse, Monitor and Oxygen. Oxygen used for patient = nasal cannula, @ 2 - 6 Liters.     Patient transported to: PACU.   transported with CRNA

## 2022-06-20 NOTE — PROGRESS NOTES
SHEATH PULL NOTE:    Patient informed of procedure with questions answered with review. Sheath site prepped with Chloraprep swab. 8 fr and 16 fr sheath in rfv pulled by BHARATHI Angela RN. Hand hold and quick clot, with manual compression to site. No bleeding, no hematoma, no pain at site. Hemostasis obtained with hand hold/manual compression at site. Patient tolerated well. No change in status. Handhold for 25 minutes. No change at site. Occlusive dressing applied to site. No bleeding, no hematoma, no pain/discomfort at site. Groin instructions provided with review. Continue to monitor procedure site and patient status. *Advised patient to keep head flat and extremity flat to decrease risk of bleeding. *Recommended that patient not drink for ONE HOUR post sheath pull completion. *Recommended that patient not eat for TWO HOURS post sheath pull completion. *Instructed patient on rationale for delay of PO products to decrease risk for aspiration and if additional treatment to procedure site is required. Patient verbalized understanding of instructions with review.

## 2022-06-21 NOTE — ROUTINE PROCESS
1500- Pt in post ablation. Geremias groins with sheaths in place. No bleeding no hematoma. SR. Denies CP SOB>   1900- OOB with assist. Amb in hallway. Voiding. Geremias groins CDI. VSS SR on monitor. Discharge instructions reviewed with pt. Pt verbalized understanding. Aicha PO. Discharged to home with daughter.

## 2022-07-28 ENCOUNTER — OFFICE VISIT (OUTPATIENT)
Dept: CARDIOLOGY CLINIC | Age: 62
End: 2022-07-28
Payer: COMMERCIAL

## 2022-07-28 VITALS
BODY MASS INDEX: 36.83 KG/M2 | RESPIRATION RATE: 14 BRPM | DIASTOLIC BLOOD PRESSURE: 80 MMHG | SYSTOLIC BLOOD PRESSURE: 124 MMHG | HEART RATE: 93 BPM | OXYGEN SATURATION: 97 % | WEIGHT: 229.2 LBS | HEIGHT: 66 IN

## 2022-07-28 DIAGNOSIS — R00.2 PALPITATIONS: ICD-10-CM

## 2022-07-28 DIAGNOSIS — I48.0 PAF (PAROXYSMAL ATRIAL FIBRILLATION) (HCC): Primary | ICD-10-CM

## 2022-07-28 DIAGNOSIS — Z99.89 OSA ON CPAP: ICD-10-CM

## 2022-07-28 DIAGNOSIS — Z72.0 TOBACCO ABUSE: ICD-10-CM

## 2022-07-28 DIAGNOSIS — G47.33 OSA ON CPAP: ICD-10-CM

## 2022-07-28 PROCEDURE — 93000 ELECTROCARDIOGRAM COMPLETE: CPT | Performed by: NURSE PRACTITIONER

## 2022-07-28 PROCEDURE — 99214 OFFICE O/P EST MOD 30 MIN: CPT | Performed by: NURSE PRACTITIONER

## 2022-07-28 NOTE — PROGRESS NOTES
Subjective:      Leta Richards is a 63 y.o. female presents for follow up. Pmhx PAF, MAYANK and current smoker. S/p Afib ablation with Dr. Dorothy Baez on 6/20/22  She has been having fluctuations in her heart rate and palpitations  Wears an apple watch and has noticed her heart rate will jump up to 120 bpm with minimal exertion or at rest  ECG today shows NSR, no recent labs   She denies any chest pain or dyspnea   No dizziness or lightheadedness  No LE edema  She says Dr. Dorothy Baez stopped her Toprol XL and more recently stopped her Eliquis  She did not have any trouble taking Eliquis in the past     ECHO ADULT COMPLETE 04/05/2022   Interpretation Summary  Formatting of this result is different from the original.      Left Ventricle: Left ventricle size is normal. Mildly increased wall thickness. Normal wall motion. Normal left ventricular systolic function with a visually estimated EF of 55 - 60%. Normal diastolic function. Contrast used: Definity.     Signed by: Denys Gates MD on 4/5/2022  5:07 PM    Patient Active Problem List    Diagnosis Date Noted    Afib ([address] 06/20/2022    Paroxysmal atrial flutter ([address] 04/05/2022    AF (atrial fibrillation) ([address] 04/04/2022    Severe obesity ([address] 07/10/2020    MAYANK on CPAP 10/17/2019    Atrial fibrillation ([address] 03/04/2018    Irregular heart beat 12/07/2015    PAF (paroxysmal atrial fibrillation) ([address] 11/28/2015    Lower urinary tract infectious disease 11/28/2015    Allergic drug rash due to sulfonamide 11/28/2015      None  Past Medical History:   Diagnosis Date    Atrial fibrillation ([address]     Long term current use of anticoagulant therapy     Eliquis    MAYANK (obstructive sleep apnea)       Past Surgical History:   Procedure Laterality Date    HX TUBAL LIGATION       Allergies   Allergen Reactions    Beef Containing Products Hives    Pork/Porcine Containing Products Hives    Erythromycin Other (comments)    Macrobid [Nitrofurantoin Monohyd/M-Cryst] Hives    Pcn [Penicillins] Hives      Family History   Problem Relation Age of Onset    Other Other         no known FH of CAD    Hypertension Father     Atrial Fibrillation Father       Social History     Socioeconomic History    Marital status:      Spouse name: Not on file    Number of children: Not on file    Years of education: Not on file    Highest education level: Not on file   Occupational History    Not on file   Tobacco Use    Smoking status: Every Day     Packs/day: 1.00     Years: 35.00     Pack years: 35.00     Types: Cigarettes    Smokeless tobacco: Never   Vaping Use    Vaping Use: Never used   Substance and Sexual Activity    Alcohol use: Not Currently    Drug use: No    Sexual activity: Not Currently   Other Topics Concern    Not on file   Social History Narrative    Not on file     Social Determinants of Health     Financial Resource Strain: Not on file   Food Insecurity: Not on file   Transportation Needs: Not on file   Physical Activity: Not on file   Stress: Not on file   Social Connections: Not on file   Intimate Partner Violence: Not on file   Housing Stability: Not on file      Current Outpatient Medications   Medication Sig    apixaban (ELIQUIS) 5 mg tablet Take 1 Tablet by mouth two (2) times a day. No current facility-administered medications for this visit. Review of Symptoms:  11 systems reviewed, negative other than as stated in the HPI    Physical ExamPhysical Exam:    Vitals:    07/28/22 1517   BP: 124/80   Pulse: 93   Resp: 14   SpO2: 97%   Weight: 229 lb 3.2 oz (104 kg)   Height: 5' 6\" (1.676 m)     Body mass index is 36.99 kg/m². General PE  Gen:  NAD  Mental Status - Alert. General Appearance - Not in acute distress. HEENT:  no carotid bruits or JVD  Chest and Lung Exam   Inspection: Accessory muscles - No use of accessory muscles in breathing.    Auscultation:   Breath sounds: - Normal.   Cardiovascular   Inspection: Jugular vein - Bilateral - Inspection Normal.   Palpation/Percussion:   Apical Impulse: - Normal.   Auscultation: Rhythm - Regular. Heart Sounds - S1 WNL and S2 WNL. No S3 or S4. Murmurs & Other Heart Sounds: Auscultation of the heart reveals - No Murmurs. Peripheral Vascular   Upper Extremity: Inspection - Bilateral - No Cyanotic nailbeds or Digital clubbing. Lower Extremity:   Palpation: Edema - Bilateral - No edema. Abdomen:   Soft, non-tender, bowel sounds are active. Neuro: A&O times 3, CN and motor grossly WNL    Labs:   Lab Results   Component Value Date/Time    Cholesterol, total 154 02/19/2018 10:13 AM    HDL Cholesterol 49 02/19/2018 10:13 AM    LDL, calculated 91 02/19/2018 10:13 AM    Triglyceride 68 02/19/2018 10:13 AM     Lab Results   Component Value Date/Time    CK 66 02/14/2018 10:06 PM     Lab Results   Component Value Date/Time    Sodium 141 05/26/2022 12:00 AM    Potassium 4.7 05/26/2022 12:00 AM    Chloride 103 05/26/2022 12:00 AM    CO2 20 05/26/2022 12:00 AM    Anion gap 3 (L) 04/05/2022 12:15 AM    Glucose 100 (H) 05/26/2022 12:00 AM    BUN 13 05/26/2022 12:00 AM    Creatinine 0.93 05/26/2022 12:00 AM    BUN/Creatinine ratio 14 05/26/2022 12:00 AM    GFR est AA >60 04/05/2022 12:15 AM    GFR est non-AA >60 04/05/2022 12:15 AM    Calcium 9.8 05/26/2022 12:00 AM    Bilirubin, total 0.5 05/26/2022 12:00 AM    Alk. phosphatase 95 05/26/2022 12:00 AM    Protein, total 6.8 05/26/2022 12:00 AM    Albumin 4.6 05/26/2022 12:00 AM    Globulin 4.1 (H) 04/04/2022 03:25 PM    A-G Ratio 2.1 05/26/2022 12:00 AM    ALT (SGPT) 16 05/26/2022 12:00 AM       EKG:  NSR      Assessment:        1. PAF (paroxysmal atrial fibrillation) ([address]    2. MAYANK on CPAP    3.  Palpitations        Orders Placed This Encounter    METABOLIC PANEL, BASIC     Standing Status:   Future     Number of Occurrences:   1     Standing Expiration Date:   7/28/2023    MAGNESIUM     Standing Status:   Future     Number of Occurrences:   1 Standing Expiration Date:   7/28/2023    AMB POC EKG ROUTINE W/ 12 LEADS, INTER & REP     Order Specific Question:   Reason for Exam:     Answer:   routine    apixaban (ELIQUIS) 5 mg tablet     Sig: Take 1 Tablet by mouth two (2) times a day.      Dispense:  60 Tablet     Refill:  0        Plan:        Paroxysmal atrial fibrillation:   Normal EF no significant valvular pathology   S/p ablation one month ago  With heart rate fluctuations and palpitations will order 48 hour holter monitor to assess rhythm  She still has loop monitor that was mailed to her \"months ago\", said she had difficulty wearing it  I asked her to return it today  Will check BMP and Mg level   Advised her to resume Eliquis 5mg BID for now until holter monitor can be reviewed  She has follow up with Dr. Walter Sow in October     MAYANK on CPAP-she states she is compliant    Tobacco abuse  Continues to smoke 1 PPD  Discussed either nicotine lozenges or nicotine patch previously      BMI 39:  Encouraged exercise     LAKISHA Holm, [facility]  [facility] [address] , [address] [facility] [address]  [facility], [address]  ) [phone] ([facility]) [phone]

## 2022-07-28 NOTE — PATIENT INSTRUCTIONS
Please have labs drawn today in [address]    Please restart your eliquis 5mg twice daily for now to help prevent stroke if you are having atrial fib

## 2022-07-29 ENCOUNTER — CLINICAL SUPPORT (OUTPATIENT)
Dept: CARDIOLOGY CLINIC | Age: 62
End: 2022-07-29
Payer: COMMERCIAL

## 2022-07-29 DIAGNOSIS — R00.2 PALPITATION: Primary | ICD-10-CM

## 2022-07-29 LAB
BUN SERPL-MCNC: 12 MG/DL (ref 8–27)
BUN/CREAT SERPL: 16 (ref 12–28)
CALCIUM SERPL-MCNC: 9.8 MG/DL (ref 8.7–10.3)
CHLORIDE SERPL-SCNC: 106 MMOL/L (ref 96–106)
CO2 SERPL-SCNC: 22 MMOL/L (ref 20–29)
CREAT SERPL-MCNC: 0.74 MG/DL (ref 0.57–1)
EGFR: 93 ML/MIN/1.73
GLUCOSE SERPL-MCNC: 92 MG/DL (ref 65–99)
MAGNESIUM SERPL-MCNC: 2.1 MG/DL (ref 1.6–2.3)
POTASSIUM SERPL-SCNC: 4.3 MMOL/L (ref 3.5–5.2)
SODIUM SERPL-SCNC: 148 MMOL/L (ref 134–144)

## 2022-07-29 PROCEDURE — 93227 XTRNL ECG REC<48 HR R&I: CPT | Performed by: INTERNAL MEDICINE

## 2022-07-29 PROCEDURE — 93225 XTRNL ECG REC<48 HRS REC: CPT | Performed by: INTERNAL MEDICINE

## 2022-07-29 NOTE — PROGRESS NOTES
Please call patient, lab results are ok, no changes needed  Future Appointments  10/18/2022 10:20 AM   Rebeca Pollock MD

## 2022-08-01 ENCOUNTER — TELEPHONE (OUTPATIENT)
Dept: CARDIOLOGY CLINIC | Age: 62
End: 2022-08-01

## 2022-08-01 NOTE — TELEPHONE ENCOUNTER
Verified Patient with two identifiers. Spoke with patient regarding results and recommendations. Patient voiced understanding. Patient has been experiencing irregular heart beats and palpitations lately and she called the on call physician yesterday, metoprolol Succ 25 mg half tablet ordered yesterday for palpitations, patient stated. No vital signs provided, but patient stated having good BP's. Patient asking if she needs to continue taking metoprolol daily. Please advise. PSR will call patient to schedule a FU visit.

## 2022-08-01 NOTE — TELEPHONE ENCOUNTER
----- Message from Shannon Pulido NP sent at 7/29/2022  3:47 PM EDT -----  Please call patient, lab results are ok, no changes needed  Future Appointments  10/18/2022 10:20 AM   Asif Walsh MD

## 2022-08-02 RX ORDER — METOPROLOL SUCCINATE 25 MG/1
25 TABLET, EXTENDED RELEASE ORAL DAILY
Qty: 30 TABLET | Refills: 1 | Status: SHIPPED | OUTPATIENT
Start: 2022-08-02 | End: 2022-09-12

## 2022-08-02 NOTE — TELEPHONE ENCOUNTER
Patient is calling because her heart rate is fluctuating all over the place. Patient has been schedule to see doctor Madhu Eduardo on 8/29/22. Please assist patient is not sure what to do.    [phone]

## 2022-08-02 NOTE — TELEPHONE ENCOUNTER
Sara - We put a holter monitor on her 4 days ago so the results should be coming back soon. For now she should increase her Toprol XL/metoprolol succinate to 25mg daily and wait for holter results.

## 2022-08-29 ENCOUNTER — OFFICE VISIT (OUTPATIENT)
Dept: CARDIOLOGY CLINIC | Age: 62
End: 2022-08-29
Payer: COMMERCIAL

## 2022-08-29 VITALS
HEIGHT: 66 IN | WEIGHT: 221 LBS | BODY MASS INDEX: 35.52 KG/M2 | RESPIRATION RATE: 20 BRPM | HEART RATE: 74 BPM | OXYGEN SATURATION: 97 % | SYSTOLIC BLOOD PRESSURE: 122 MMHG | DIASTOLIC BLOOD PRESSURE: 72 MMHG

## 2022-08-29 DIAGNOSIS — I48.0 PAF (PAROXYSMAL ATRIAL FIBRILLATION) (HCC): Primary | ICD-10-CM

## 2022-08-29 DIAGNOSIS — Z72.0 TOBACCO ABUSE: ICD-10-CM

## 2022-08-29 DIAGNOSIS — R00.2 PALPITATIONS: ICD-10-CM

## 2022-08-29 DIAGNOSIS — I10 BENIGN ESSENTIAL HTN: ICD-10-CM

## 2022-08-29 DIAGNOSIS — I48.92 PAROXYSMAL ATRIAL FLUTTER (HCC): ICD-10-CM

## 2022-08-29 DIAGNOSIS — Z99.89 OSA ON CPAP: ICD-10-CM

## 2022-08-29 DIAGNOSIS — G47.33 OSA ON CPAP: ICD-10-CM

## 2022-08-29 PROCEDURE — 99214 OFFICE O/P EST MOD 30 MIN: CPT | Performed by: INTERNAL MEDICINE

## 2022-08-29 PROCEDURE — 93000 ELECTROCARDIOGRAM COMPLETE: CPT | Performed by: INTERNAL MEDICINE

## 2022-08-29 NOTE — PROGRESS NOTES
Subjective:      Miguel A Garcia is a 63 y.o. female presents for follow up. Pmhx PAF, MAYANK and current smoker. S/p Afib ablation with Dr. Juan Pulliam on 6/20/22  She has been having fluctuations in her heart rate and palpitations  Wears an apple watch and has noticed her heart rate will jump up to 120 bpm with minimal exertion or at rest  ECG today shows NSR, no recent labs   She denies any chest pain or dyspnea   No dizziness or lightheadedness  No LE edema  She says Dr. Juan Pulliam stopped her Toprol XL and more recently stopped her Eliquis  She did not have any trouble taking Eliquis in the past     ECHO ADULT COMPLETE 04/05/2022   Interpretation Summary  Formatting of this result is different from the original.      Left Ventricle: Left ventricle size is normal. Mildly increased wall thickness. Normal wall motion. Normal left ventricular systolic function with a visually estimated EF of 55 - 60%. Normal diastolic function. Contrast used: Definity.     Signed by: Cristina Weathers MD on 4/5/2022  5:07 PM    Patient Active Problem List    Diagnosis Date Noted    Afib ([address] 06/20/2022    Paroxysmal atrial flutter ([address] 04/05/2022    AF (atrial fibrillation) ([address] 04/04/2022    Severe obesity ([address] 07/10/2020    MAYANK on CPAP 10/17/2019    Atrial fibrillation ([address] 03/04/2018    Irregular heart beat 12/07/2015    PAF (paroxysmal atrial fibrillation) ([address] 11/28/2015    Lower urinary tract infectious disease 11/28/2015    Allergic drug rash due to sulfonamide 11/28/2015      None  Past Medical History:   Diagnosis Date    Atrial fibrillation ([address]     Long term current use of anticoagulant therapy     Eliquis    MAYANK (obstructive sleep apnea)       Past Surgical History:   Procedure Laterality Date    HX TUBAL LIGATION       Allergies   Allergen Reactions    Beef Containing Products Hives    Pork/Porcine Containing Products Hives    Erythromycin Other (comments)    Macrobid [Nitrofurantoin Monohyd/M-Cryst] Hives    Pcn [Penicillins] Hives      Family History   Problem Relation Age of Onset    Other Other         no known FH of CAD    Hypertension Father     Atrial Fibrillation Father       Social History     Socioeconomic History    Marital status:      Spouse name: Not on file    Number of children: Not on file    Years of education: Not on file    Highest education level: Not on file   Occupational History    Not on file   Tobacco Use    Smoking status: Every Day     Packs/day: 1.00     Years: 35.00     Pack years: 35.00     Types: Cigarettes    Smokeless tobacco: Never    Tobacco comments:     1 pack a day    Vaping Use    Vaping Use: Never used   Substance and Sexual Activity    Alcohol use: Not Currently    Drug use: No    Sexual activity: Not Currently   Other Topics Concern    Not on file   Social History Narrative    Not on file     Social Determinants of Health     Financial Resource Strain: Not on file   Food Insecurity: Not on file   Transportation Needs: Not on file   Physical Activity: Not on file   Stress: Not on file   Social Connections: Not on file   Intimate Partner Violence: Not on file   Housing Stability: Not on file      Current Outpatient Medications   Medication Sig    metoprolol succinate (TOPROL-XL) 25 mg XL tablet Take 1 Tablet by mouth in the morning. apixaban (ELIQUIS) 5 mg tablet Take 1 Tablet by mouth two (2) times a day. No current facility-administered medications for this visit. Review of Symptoms:  11 systems reviewed, negative other than as stated in the HPI    Physical ExamPhysical Exam:    Vitals:    08/29/22 1052   BP: 122/72   Pulse: 74   Resp: 20   SpO2: 97%   Weight: 221 lb (100.2 kg)   Height: 5' 6\" (1.676 m)     Body mass index is 35.67 kg/m². General PE  Gen:  NAD  Mental Status - Alert. General Appearance - Not in acute distress.    HEENT:  no carotid bruits or JVD  Chest and Lung Exam   Inspection: Accessory muscles - No use of accessory muscles in breathing. Auscultation:   Breath sounds: - Normal.   Cardiovascular   Inspection: Jugular vein - Bilateral - Inspection Normal.   Palpation/Percussion:   Apical Impulse: - Normal.   Auscultation: Rhythm - Regular. Heart Sounds - S1 WNL and S2 WNL. No S3 or S4. Murmurs & Other Heart Sounds: Auscultation of the heart reveals - No Murmurs. Peripheral Vascular   Upper Extremity: Inspection - Bilateral - No Cyanotic nailbeds or Digital clubbing. Lower Extremity:   Palpation: Edema - Bilateral - No edema. Abdomen:   Soft, non-tender, bowel sounds are active. Neuro: A&O times 3, CN and motor grossly WNL    Labs:   Lab Results   Component Value Date/Time    Cholesterol, total 154 02/19/2018 10:13 AM    HDL Cholesterol 49 02/19/2018 10:13 AM    LDL, calculated 91 02/19/2018 10:13 AM    Triglyceride 68 02/19/2018 10:13 AM     Lab Results   Component Value Date/Time    CK 66 02/14/2018 10:06 PM     Lab Results   Component Value Date/Time    Sodium 148 (H) 07/28/2022 12:00 AM    Potassium 4.3 07/28/2022 12:00 AM    Chloride 106 07/28/2022 12:00 AM    CO2 22 07/28/2022 12:00 AM    Anion gap 3 (L) 04/05/2022 12:15 AM    Glucose 92 07/28/2022 12:00 AM    BUN 12 07/28/2022 12:00 AM    Creatinine 0.74 07/28/2022 12:00 AM    BUN/Creatinine ratio 16 07/28/2022 12:00 AM    GFR est AA >60 04/05/2022 12:15 AM    GFR est non-AA >60 04/05/2022 12:15 AM    Calcium 9.8 07/28/2022 12:00 AM    Bilirubin, total 0.5 05/26/2022 12:00 AM    Alk. phosphatase 95 05/26/2022 12:00 AM    Protein, total 6.8 05/26/2022 12:00 AM    Albumin 4.6 05/26/2022 12:00 AM    Globulin 4.1 (H) 04/04/2022 03:25 PM    A-G Ratio 2.1 05/26/2022 12:00 AM    ALT (SGPT) 16 05/26/2022 12:00 AM       EKG:  NSR      Assessment:        1. PAF (paroxysmal atrial fibrillation) ([address]    2. MAYANK on CPAP    3. Palpitations    4. Tobacco abuse    5. Paroxysmal atrial flutter (HCC)    6. Benign essential HTN    7.  Adult BMI 35.0-35.9 kg/sq m Orders Placed This Encounter    AMB POC EKG ROUTINE W/ 12 LEADS, INTER & REP     Order Specific Question:   Reason for Exam:     Answer:   routine        Plan:        Paroxysmal atrial fibrillation:   Normal EF no significant valvular pathology   S/p ablation 6/2022  With heart rate fluctuations and palpitations we ordered 2-day Holter monitor in July 2022:  Patient wore the monitor for 24 hours, but always has difficulty with the monitor staying on properly. She has not yet returned the monitor. She follows with Dr. Georgi Nur regularly, but not seen since monitor  BMP and Mg level OK 7/2022  Advised her to resume Eliquis 5mg BID and metoprolol  Advised the patient we cannot really make any decisions about diagnosis and treatment until the monitor has been turned in, and that we will notify her of our thoughts, appropriate treatment and follow-up based on results. She will turn it in. MAYANK on CPAP-she states she is compliant    Tobacco abuse  Continues to smoke 1 PPD  Discussed either nicotine lozenges or nicotine patch previously      BMI 39:  Encouraged exercise     Follow-up to be determined based on test results.

## 2022-09-12 RX ORDER — METOPROLOL SUCCINATE 25 MG/1
TABLET, EXTENDED RELEASE ORAL
Qty: 90 TABLET | Refills: 0 | Status: SHIPPED | OUTPATIENT
Start: 2022-09-12

## 2022-09-12 RX ORDER — PROPAFENONE HYDROCHLORIDE 225 MG/1
CAPSULE, EXTENDED RELEASE ORAL
Qty: 180 CAPSULE | Refills: 3 | OUTPATIENT
Start: 2022-09-12

## 2022-09-12 NOTE — TELEPHONE ENCOUNTER
PCP: None    Last appt:8/29/22  Future Appointments   Date Time Provider Zayra Sanchez   10/18/2022 10:20 AM MD TRACEY Cha BS AMB       Requested Prescriptions     Signed Prescriptions Disp Refills    metoprolol succinate (TOPROL-XL) 25 mg XL tablet 90 Tablet 0     Sig: TAKE 1 TABLET BY MOUTH EVERY NIGHT     Authorizing Provider: Inessa Lakhani     Ordering User: MADDISON GONZALEZ     Refused Prescriptions Disp Refills    propafenone SR (RYTHMOL SR) 225 mg SR capsule [Pharmacy Med Name: PROPAFENONE HCL  MG CAP] 180 Capsule 3     Sig: TAKE 1 CAPSULE BY MOUTH EVERY 12 HOURS     Refused By: MADDISON Gandhi     Reason for Refusal: Not the prescriber of this medication         Other Comments:  Verbal order per provider. Order (medication, dose, route, frequency, amount, refills) repeated and verified twice.

## 2022-10-13 NOTE — PROGRESS NOTES
Subjective:      Kenneth Maguire is a 63 y.o. female presents for follow up. Pmhx PAF, MAYANK and current smoker. S/p Afib ablation with Dr. Katya Dean on 6/20/22  She has been having fluctuations in her heart rate and palpitations  Wears an apple watch and has noticed her heart rate will jump up to 120 bpm with minimal exertion or at rest  ECG today shows NSR, no recent labs   She denies any chest pain or dyspnea   No dizziness or lightheadedness  No LE edema  She says Dr. Katya Dean stopped her Toprol XL and more recently stopped her Eliquis  She did not have any trouble taking Eliquis in the past         Seen by Dr Naheed Preales 8/29/2022. Has episodes of irregular heart beat, has appointment  with Dr Katya Dean tomorrow to discuss. She is taking Eliquis--no excessive bruising / bleeding. The patient denies chest pain/ shortness of breath, orthopnea, PND, LE edema,  syncope, presyncope or fatigue. ECHO ADULT COMPLETE 04/05/2022   Interpretation Summary  Formatting of this result is different from the original.      Left Ventricle: Left ventricle size is normal. Mildly increased wall thickness. Normal wall motion. Normal left ventricular systolic function with a visually estimated EF of 55 - 60%. Normal diastolic function. Contrast used: Definity.     Signed by: Boy Hawk MD on 4/5/2022  5:07 PM      Patient Active Problem List    Diagnosis Date Noted    Afib ([address] 06/20/2022    Paroxysmal atrial flutter ([address] 04/05/2022    AF (atrial fibrillation) ([address] 04/04/2022    Severe obesity ([address] 07/10/2020    MAYANK on CPAP 10/17/2019    Atrial fibrillation ([address] 03/04/2018    Irregular heart beat 12/07/2015    PAF (paroxysmal atrial fibrillation) ([address] 11/28/2015    Lower urinary tract infectious disease 11/28/2015    Allergic drug rash due to sulfonamide 11/28/2015      None  Past Medical History:   Diagnosis Date    Atrial fibrillation [facility])     Long term current use of anticoagulant therapy     Eliquis    MAYANK (obstructive sleep apnea)       Past Surgical History:   Procedure Laterality Date    HX TUBAL LIGATION       Allergies   Allergen Reactions    Beef Containing Products Hives    Pork/Porcine Containing Products Hives    Erythromycin Other (comments)    Macrobid [Nitrofurantoin Monohyd/M-Cryst] Hives    Pcn [Penicillins] Hives      Family History   Problem Relation Age of Onset    Other Other         no known FH of CAD    Hypertension Father     Atrial Fibrillation Father       Social History     Socioeconomic History    Marital status:      Spouse name: Not on file    Number of children: Not on file    Years of education: Not on file    Highest education level: Not on file   Occupational History    Not on file   Tobacco Use    Smoking status: Every Day     Packs/day: 1.00     Years: 35.00     Pack years: 35.00     Types: Cigarettes    Smokeless tobacco: Never    Tobacco comments:     1 pack a day    Vaping Use    Vaping Use: Never used   Substance and Sexual Activity    Alcohol use: Not Currently    Drug use: No    Sexual activity: Not Currently   Other Topics Concern    Not on file   Social History Narrative    Not on file     Social Determinants of Health     Financial Resource Strain: Not on file   Food Insecurity: Not on file   Transportation Needs: Not on file   Physical Activity: Not on file   Stress: Not on file   Social Connections: Not on file   Intimate Partner Violence: Not on file   Housing Stability: Not on file      Current Outpatient Medications   Medication Sig    metoprolol succinate (TOPROL-XL) 25 mg XL tablet TAKE 1 TABLET BY MOUTH EVERY NIGHT    apixaban (ELIQUIS) 5 mg tablet Take 1 Tablet by mouth two (2) times a day. No current facility-administered medications for this visit.          Review of Symptoms:  11 systems reviewed, negative other than as stated in the HPI    Physical ExamPhysical Exam:    Vitals:    10/24/22 1030   BP: 120/80   Pulse: 80   Resp: 16   SpO2: 97%   Weight: 218 lb (98.9 kg)   Height: 5' 6\" (1.676 m)       Body mass index is 35.19 kg/m². General PE  Gen:  NAD  Mental Status - Alert. General Appearance - Not in acute distress. HEENT:  no carotid bruits or JVD  Chest and Lung Exam   Inspection: Accessory muscles - No use of accessory muscles in breathing. Auscultation:   Breath sounds: - Normal.   Cardiovascular   Inspection: Jugular vein - Bilateral - Inspection Normal.   Palpation/Percussion:   Apical Impulse: - Normal.   Auscultation: Rhythm - Regular. Heart Sounds - S1 WNL and S2 WNL. No S3 or S4. Murmurs & Other Heart Sounds: Auscultation of the heart reveals - No Murmurs. Peripheral Vascular   Upper Extremity: Inspection - Bilateral - No Cyanotic nailbeds or Digital clubbing. Lower Extremity:   Palpation: Edema - Bilateral - No edema. Abdomen:   Soft, non-tender, bowel sounds are active. Neuro: A&O times 3, CN and motor grossly WNL    Labs:   Lab Results   Component Value Date/Time    Cholesterol, total 154 02/19/2018 10:13 AM    HDL Cholesterol 49 02/19/2018 10:13 AM    LDL, calculated 91 02/19/2018 10:13 AM    Triglyceride 68 02/19/2018 10:13 AM     Lab Results   Component Value Date/Time    CK 66 02/14/2018 10:06 PM     Lab Results   Component Value Date/Time    Sodium 148 (H) 07/28/2022 12:00 AM    Potassium 4.3 07/28/2022 12:00 AM    Chloride 106 07/28/2022 12:00 AM    CO2 22 07/28/2022 12:00 AM    Anion gap 3 (L) 04/05/2022 12:15 AM    Glucose 92 07/28/2022 12:00 AM    BUN 12 07/28/2022 12:00 AM    Creatinine 0.74 07/28/2022 12:00 AM    BUN/Creatinine ratio 16 07/28/2022 12:00 AM    GFR est AA >60 04/05/2022 12:15 AM    GFR est non-AA >60 04/05/2022 12:15 AM    Calcium 9.8 07/28/2022 12:00 AM    Bilirubin, total 0.5 05/26/2022 12:00 AM    Alk.  phosphatase 95 05/26/2022 12:00 AM    Protein, total 6.8 05/26/2022 12:00 AM    Albumin 4.6 05/26/2022 12:00 AM    Globulin 4.1 (H) 04/04/2022 03:25 PM    A-G Ratio 2.1 05/26/2022 12:00 AM    ALT (SGPT) 16 05/26/2022 12:00 AM       EKG:         Assessment:        1. PAF (paroxysmal atrial fibrillation) ([address]    2. Tobacco abuse    3. MAYANK on CPAP    4. Paroxysmal atrial flutter (HCC)    5. Benign essential HTN    6. Adult BMI 35.0-35.9 kg/sq m        No orders of the defined types were placed in this encounter. Plan:        Paroxysmal atrial fibrillation:   Normal EF no significant valvular pathology   S/p ablation 6/2022  With heart rate fluctuations and palpitations we ordered 2-day Holter monitor in July 2022:  Patient wore the monitor for 24 hours, but always has difficulty with the monitor staying on properly. She has not yet returned the monitor.   BMP and Mg level OK 7/2022  Continue Eliquis 5mg BID and metoprolol  She has appointment to see Dr Katya Dean tomorrow 10/25/2022    MAYANK on CPAP-she states she is compliant    Tobacco abuse  Continues to smoke 1 PPD  Discussed either nicotine lozenges or nicotine patch previously      BMI 39:  Encouraged exercise       RTC in 6 mos

## 2022-10-14 ENCOUNTER — TELEPHONE (OUTPATIENT)
Dept: CARDIOLOGY CLINIC | Age: 62
End: 2022-10-14

## 2022-10-14 NOTE — TELEPHONE ENCOUNTER
Verified Patient with two identifiers  Patient has been experiencing not to be in rhythm for the last month, Pulse 75-81, no other cardiac symptoms, stated. No other vitals provided/     Patient stopped Rhytmol after ablation in June 20 and she feels like she might need it again. Please advise.

## 2022-10-14 NOTE — TELEPHONE ENCOUNTER
Per Mikayla Crane, NP     Needs to contact Dr Nancy Manning who did her EP procedure. Verified Patient with two identifiers  Patient stated Dr. Mack Ours was the one to put her back on Metoprolol and she is feeling worse since then. Also asking if she should go back on Rhytmol.

## 2022-10-14 NOTE — TELEPHONE ENCOUNTER
Patient states she is having a problem with irregular heart beat. Requesting to resume her meds for arrhythmia, please advise.           [phone]

## 2022-10-17 NOTE — TELEPHONE ENCOUNTER
Last note of Dr Esperanza Tejada in 8/2022   Paroxysmal atrial fibrillation:    Normal EF no significant valvular pathology   S/p ablation 6/2022   With heart rate fluctuations and palpitations we ordered 2-day Holter monitor in July 2022:   Patient wore the monitor for 24 hours, but always has difficulty with the monitor staying on properly. She has not yet returned the monitor. She follows with Dr. Roberto Carlos Singletary regularly, but not seen since monitor   BMP and Mg level OK 7/2022   Advised her to resume Eliquis 5mg BID and metoprolol   Advised the patient we cannot really make any decisions about diagnosis and treatment until the monitor has been turned in, and that we will notify her of our thoughts, appropriate treatment and follow-up based on results. She will turn it in. Did she ever turn in the monitor? Because I cant find the result.

## 2022-10-17 NOTE — TELEPHONE ENCOUNTER
Verified Patient with two identifiers  Patient stated that she returned the Monitor on august 30. Patient did not have a [employer] Monitor. Message sent to [facility] for possible report.

## 2022-10-17 NOTE — TELEPHONE ENCOUNTER
Per J Luis Sinclair, NP  Can hold metoprolol if she feels \"bad\" taking it. Have her see either Dr Ericka Rodas or Dr Berta Guzman (she will be new pt). Verified Patient with two identifiers. Spoke with patient regarding results and recommendations. Patient voiced understanding and feels more comfortable contacting/making appointment with  Dr. Ericka Rodas and Will continue taking metoprolol, stated      Preliminary report from Holter obtain. Maximum heart reate 166 bpm. Minimum HR 70 on 1st day. 2nd day Average heart rate 84 bpm  There were 2195 PVC's with burden of 2.85%  7 PSVCs burden 0.02%  3 occurrences of supraventricular tachycardia, with longest episode 9 beats.   Please advise

## 2022-10-19 NOTE — TELEPHONE ENCOUNTER
Late entry for October 17. Verified Patient with two identifiers  Spoke with patient regarding results and recommendations. Patient voiced understanding. Patient will make an appointment with Dr. Rebeka Garrett. Holter left for your review.

## 2022-10-24 ENCOUNTER — OFFICE VISIT (OUTPATIENT)
Dept: CARDIOLOGY CLINIC | Age: 62
End: 2022-10-24
Payer: COMMERCIAL

## 2022-10-24 VITALS
DIASTOLIC BLOOD PRESSURE: 80 MMHG | SYSTOLIC BLOOD PRESSURE: 120 MMHG | BODY MASS INDEX: 35.03 KG/M2 | HEART RATE: 80 BPM | OXYGEN SATURATION: 97 % | HEIGHT: 66 IN | RESPIRATION RATE: 16 BRPM | WEIGHT: 218 LBS

## 2022-10-24 DIAGNOSIS — I10 BENIGN ESSENTIAL HTN: ICD-10-CM

## 2022-10-24 DIAGNOSIS — I48.92 PAROXYSMAL ATRIAL FLUTTER (HCC): ICD-10-CM

## 2022-10-24 DIAGNOSIS — G47.33 OSA ON CPAP: ICD-10-CM

## 2022-10-24 DIAGNOSIS — I48.0 PAF (PAROXYSMAL ATRIAL FIBRILLATION) (HCC): Primary | ICD-10-CM

## 2022-10-24 DIAGNOSIS — Z72.0 TOBACCO ABUSE: ICD-10-CM

## 2022-10-24 DIAGNOSIS — Z99.89 OSA ON CPAP: ICD-10-CM

## 2022-10-24 PROCEDURE — 99214 OFFICE O/P EST MOD 30 MIN: CPT | Performed by: NURSE PRACTITIONER

## 2022-10-30 ENCOUNTER — TELEPHONE (OUTPATIENT)
Dept: CARDIOLOGY CLINIC | Age: 62
End: 2022-10-30

## 2022-10-30 NOTE — TELEPHONE ENCOUNTER
Please advise the patient her monitor showed occasional early heartbeats called premature ventricular contractions that are not dangerous. There is 3 short runs of fast heartbeat called supraventricular tachycardia, with a maximum of 9 beats. This is not dangerous. Continue current treatments including Eliquis and metoprolol.

## 2022-10-31 NOTE — TELEPHONE ENCOUNTER
Verified Patient with two identifiers  Spoke with patient regarding results and recommendations. Patient voiced understanding. Patient stated that went to see dr. Lorna Cisneros and will be having another monitor from that office.

## 2023-03-10 ENCOUNTER — HOSPITAL ENCOUNTER (EMERGENCY)
Age: 63
Discharge: HOME | End: 2023-03-10
Attending: EMERGENCY MEDICINE
Payer: COMMERCIAL

## 2023-03-10 ENCOUNTER — APPOINTMENT (OUTPATIENT)
Dept: GENERAL RADIOLOGY | Age: 63
End: 2023-03-10
Attending: EMERGENCY MEDICINE
Payer: COMMERCIAL

## 2023-03-10 VITALS
SYSTOLIC BLOOD PRESSURE: 113 MMHG | WEIGHT: 208.78 LBS | DIASTOLIC BLOOD PRESSURE: 52 MMHG | TEMPERATURE: 98.2 F | OXYGEN SATURATION: 98 % | HEIGHT: 66 IN | RESPIRATION RATE: 13 BRPM | HEART RATE: 70 BPM | BODY MASS INDEX: 33.55 KG/M2

## 2023-03-10 DIAGNOSIS — I49.8 VENTRICULAR BIGEMINY: Primary | ICD-10-CM

## 2023-03-10 LAB
ALBUMIN SERPL-MCNC: 3.4 G/DL (ref 3.5–5)
ALBUMIN/GLOB SERPL: 1 (ref 1.1–2.2)
ALP SERPL-CCNC: 76 U/L (ref 45–117)
ALT SERPL-CCNC: 23 U/L (ref 12–78)
ANION GAP SERPL CALC-SCNC: 5 MMOL/L (ref 5–15)
AST SERPL-CCNC: 14 U/L (ref 15–37)
ATRIAL RATE: 82 BPM
BASOPHILS # BLD: 0 K/UL (ref 0–0.1)
BASOPHILS NFR BLD: 1 % (ref 0–1)
BILIRUB SERPL-MCNC: 0.5 MG/DL (ref 0.2–1)
BNP SERPL-MCNC: 591 PG/ML
BUN SERPL-MCNC: 16 MG/DL (ref 6–20)
BUN/CREAT SERPL: 20 (ref 12–20)
CALCIUM SERPL-MCNC: 9.1 MG/DL (ref 8.5–10.1)
CALCULATED P AXIS, ECG09: 57 DEGREES
CALCULATED R AXIS, ECG10: -35 DEGREES
CALCULATED T AXIS, ECG11: -113 DEGREES
CHLORIDE SERPL-SCNC: 108 MMOL/L (ref 97–108)
CO2 SERPL-SCNC: 26 MMOL/L (ref 21–32)
CREAT SERPL-MCNC: 0.81 MG/DL (ref 0.55–1.02)
DIAGNOSIS, 93000: NORMAL
DIFFERENTIAL METHOD BLD: ABNORMAL
EOSINOPHIL # BLD: 0.1 K/UL (ref 0–0.4)
EOSINOPHIL NFR BLD: 2 % (ref 0–7)
ERYTHROCYTE [DISTWIDTH] IN BLOOD BY AUTOMATED COUNT: 12.7 % (ref 11.5–14.5)
GLOBULIN SER CALC-MCNC: 3.5 G/DL (ref 2–4)
GLUCOSE SERPL-MCNC: 112 MG/DL (ref 65–100)
HCT VFR BLD AUTO: 48.8 % (ref 35–47)
HGB BLD-MCNC: 15.8 G/DL (ref 11.5–16)
IMM GRANULOCYTES # BLD AUTO: 0 K/UL (ref 0–0.04)
IMM GRANULOCYTES NFR BLD AUTO: 0 % (ref 0–0.5)
LYMPHOCYTES # BLD: 1.9 K/UL (ref 0.8–3.5)
LYMPHOCYTES NFR BLD: 24 % (ref 12–49)
MAGNESIUM SERPL-MCNC: 1.9 MG/DL (ref 1.6–2.4)
MCH RBC QN AUTO: 30.4 PG (ref 26–34)
MCHC RBC AUTO-ENTMCNC: 32.4 G/DL (ref 30–36.5)
MCV RBC AUTO: 93.8 FL (ref 80–99)
MONOCYTES # BLD: 0.6 K/UL (ref 0–1)
MONOCYTES NFR BLD: 8 % (ref 5–13)
NEUTS SEG # BLD: 5 K/UL (ref 1.8–8)
NEUTS SEG NFR BLD: 65 % (ref 32–75)
NRBC # BLD: 0 K/UL (ref 0–0.01)
NRBC BLD-RTO: 0 PER 100 WBC
P-R INTERVAL, ECG05: 132 MS
PLATELET # BLD AUTO: 259 K/UL (ref 150–400)
PMV BLD AUTO: 9.9 FL (ref 8.9–12.9)
POTASSIUM SERPL-SCNC: 3.8 MMOL/L (ref 3.5–5.1)
PROT SERPL-MCNC: 6.9 G/DL (ref 6.4–8.2)
Q-T INTERVAL, ECG07: 362 MS
QRS DURATION, ECG06: 78 MS
QTC CALCULATION (BEZET), ECG08: 422 MS
RBC # BLD AUTO: 5.2 M/UL (ref 3.8–5.2)
SODIUM SERPL-SCNC: 139 MMOL/L (ref 136–145)
TROPONIN I SERPL HS-MCNC: 4 NG/L (ref 0–51)
VENTRICULAR RATE, ECG03: 82 BPM
WBC # BLD AUTO: 7.7 K/UL (ref 3.6–11)

## 2023-03-10 PROCEDURE — 83880 ASSAY OF NATRIURETIC PEPTIDE: CPT

## 2023-03-10 PROCEDURE — 80053 COMPREHEN METABOLIC PANEL: CPT

## 2023-03-10 PROCEDURE — 36415 COLL VENOUS BLD VENIPUNCTURE: CPT

## 2023-03-10 PROCEDURE — 84484 ASSAY OF TROPONIN QUANT: CPT

## 2023-03-10 PROCEDURE — 85025 COMPLETE CBC W/AUTO DIFF WBC: CPT

## 2023-03-10 PROCEDURE — 71045 X-RAY EXAM CHEST 1 VIEW: CPT

## 2023-03-10 PROCEDURE — 93005 ELECTROCARDIOGRAM TRACING: CPT

## 2023-03-10 PROCEDURE — 83735 ASSAY OF MAGNESIUM: CPT

## 2023-03-10 PROCEDURE — 99285 EMERGENCY DEPT VISIT HI MDM: CPT

## 2023-03-10 RX ORDER — LANOLIN ALCOHOL/MO/W.PET/CERES
400 CREAM (GRAM) TOPICAL DAILY
Qty: 30 TABLET | Refills: 0 | Status: SHIPPED | OUTPATIENT
Start: 2023-03-10 | End: 2023-04-09

## 2023-03-10 RX ORDER — METOPROLOL SUCCINATE 25 MG/1
75 TABLET, EXTENDED RELEASE ORAL
Qty: 90 TABLET | Refills: 0 | Status: SHIPPED | OUTPATIENT
Start: 2023-03-10 | End: 2023-03-17

## 2023-03-10 NOTE — DISCHARGE INSTRUCTIONS
Increase your dose of home metoprolol. Start supplemental magnesium. I have sent a prescription though any over-the-counter magnesium supplement should be effective.

## 2023-03-10 NOTE — ED PROVIDER NOTES
[facility] EMERGENCY DEPT  EMERGENCY DEPARTMENT ENCOUNTER       Pt Name: Jayson Bryant  MRN: [id number]  [place] 1960  Date of evaluation: 3/10/2023  Provider: Zackery Hodgkins, MD   PCP: None  Note Started: 8:35 AM 3/10/23     CHIEF COMPLAINT       Chief Complaint   Patient presents with    Irregular Heart Beat     In the last week, her heart rate will go up and drop, but it is staying lower longer. Pt is on Metoprolol pt has AFib and had an ablation        HISTORY OF PRESENT ILLNESS: 1 or more elements      History From: Patient, History limited by: None     Jayson Bryant is a 66 y.o. female who presents with palpitations, shakiness, nausea. She reports over the past few weeks she has felt her heart rate go up and down. Again she reports with these episodes she feels tremulous, nauseous. .  She was diagnosed in December with frequent PVCs and her metoprolol was increased from 25-50 daily. Other than this she is on her Eliquis with no other medications. She denies syncope or near syncope denies chest pain or dyspnea. Nursing Notes were all reviewed and agreed with or any disagreements were addressed in the HPI. REVIEW OF SYSTEMS        Positives and Pertinent negatives as per HPI.     PAST HISTORY     Past Medical History:  Past Medical History:   Diagnosis Date    Atrial fibrillation ([address]     Long term current use of anticoagulant therapy     Eliquis    MAYANK (obstructive sleep apnea)        Past Surgical History:  Past Surgical History:   Procedure Laterality Date    HX TUBAL LIGATION         Family History:  Family History   Problem Relation Age of Onset    Other Other         no known FH of CAD    Hypertension Father     Atrial Fibrillation Father        Social History:  Social History     Tobacco Use    Smoking status: Every Day     Packs/day: 1.00     Years: 35.00     Pack years: 35.00     Types: Cigarettes    Smokeless tobacco: Never    Tobacco comments:     1 pack a day    Vaping Use    Vaping Use: Never used   Substance Use Topics    Alcohol use: Not Currently    Drug use: No       Allergies: Allergies   Allergen Reactions    Beef Containing Products Hives    Pork/Porcine Containing Products Hives    Erythromycin Other (comments)    Macrobid [Nitrofurantoin Monohyd/M-Cryst] Hives    Pcn [Penicillins] Hives       CURRENT MEDICATIONS      Previous Medications    ELIQUIS 5 MG TABLET    TAKE 1 TABLET BY MOUTH TWICE A DAY       SCREENINGS               No data recorded         PHYSICAL EXAM      ED Triage Vitals [03/10/23 0825]   ED Encounter Vitals Group      /89      Pulse (Heart Rate) (!) 40      Resp Rate 14      Temp 98.2 °F (36.8 °C)      Temp src       O2 Sat (%) 98 %      Weight 208 lb 12.4 oz      Height 5' 6\"        Physical Exam  Vitals and nursing note reviewed. Constitutional:       Appearance: Normal appearance. She is not ill-appearing. HENT:      Mouth/Throat:      Mouth: Mucous membranes are dry. Cardiovascular:      Rate and Rhythm: Regular rhythm. Bradycardia present. Heart sounds: Normal heart sounds. Pulmonary:      Effort: Pulmonary effort is normal.      Breath sounds: Normal breath sounds. Musculoskeletal:      Right lower leg: No edema. Left lower leg: No edema. Skin:     General: Skin is warm and dry.         DIAGNOSTIC RESULTS   LABS:     Recent Results (from the past 12 hour(s))   EKG, 12 LEAD, INITIAL    Collection Time: 03/10/23  8:28 AM   Result Value Ref Range    Ventricular Rate 82 BPM    Atrial Rate 82 BPM    P-R Interval 132 ms    QRS Duration 78 ms    Q-T Interval 362 ms    QTC Calculation (Bezet) 422 ms    Calculated P Axis 57 degrees    Calculated R Axis -35 degrees    Calculated T Axis -113 degrees    Diagnosis       Sinus rhythm with frequent premature ventricular complexes in a pattern of   bigeminy  Left axis deviation  ST & T wave abnormality, consider inferior ischemia  ST & T wave abnormality, consider anterolateral ischemia  When compared with ECG of 05-APR-2022 12:38,  premature ventricular complexes are now present     CBC WITH AUTOMATED DIFF    Collection Time: 03/10/23  8:52 AM   Result Value Ref Range    WBC 7.7 3.6 - 11.0 K/uL    RBC 5.20 3.80 - 5.20 M/uL    HGB 15.8 11.5 - 16.0 g/dL    HCT 48.8 (H) 35.0 - 47.0 %    MCV 93.8 80.0 - 99.0 FL    MCH 30.4 26.0 - 34.0 PG    MCHC 32.4 30.0 - 36.5 g/dL    RDW 12.7 11.5 - 14.5 %    PLATELET 603 221 - 028 K/uL    MPV 9.9 8.9 - 12.9 FL    NRBC 0.0 0  WBC    ABSOLUTE NRBC 0.00 0.00 - 0.01 K/uL    NEUTROPHILS 65 32 - 75 %    LYMPHOCYTES 24 12 - 49 %    MONOCYTES 8 5 - 13 %    EOSINOPHILS 2 0 - 7 %    BASOPHILS 1 0 - 1 %    IMMATURE GRANULOCYTES 0 0.0 - 0.5 %    ABS. NEUTROPHILS 5.0 1.8 - 8.0 K/UL    ABS. LYMPHOCYTES 1.9 0.8 - 3.5 K/UL    ABS. MONOCYTES 0.6 0.0 - 1.0 K/UL    ABS. EOSINOPHILS 0.1 0.0 - 0.4 K/UL    ABS. BASOPHILS 0.0 0.0 - 0.1 K/UL    ABS. IMM. GRANS. 0.0 0.00 - 0.04 K/UL    DF AUTOMATED     METABOLIC PANEL, COMPREHENSIVE    Collection Time: 03/10/23  8:52 AM   Result Value Ref Range    Sodium 139 136 - 145 mmol/L    Potassium 3.8 3.5 - 5.1 mmol/L    Chloride 108 97 - 108 mmol/L    CO2 26 21 - 32 mmol/L    Anion gap 5 5 - 15 mmol/L    Glucose 112 (H) 65 - 100 mg/dL    BUN 16 6 - 20 MG/DL    Creatinine 0.81 0.55 - 1.02 MG/DL    BUN/Creatinine ratio 20 12 - 20      eGFR >60 >60 ml/min/1.73m2    Calcium 9.1 8.5 - 10.1 MG/DL    Bilirubin, total 0.5 0.2 - 1.0 MG/DL    ALT (SGPT) 23 12 - 78 U/L    AST (SGOT) 14 (L) 15 - 37 U/L    Alk.  phosphatase 76 45 - 117 U/L    Protein, total 6.9 6.4 - 8.2 g/dL    Albumin 3.4 (L) 3.5 - 5.0 g/dL    Globulin 3.5 2.0 - 4.0 g/dL    A-G Ratio 1.0 (L) 1.1 - 2.2     NT-PRO BNP    Collection Time: 03/10/23  8:52 AM   Result Value Ref Range    NT pro- (H) <125 PG/ML   TROPONIN-HIGH SENSITIVITY    Collection Time: 03/10/23  8:52 AM   Result Value Ref Range    Troponin-High Sensitivity 4 0 - 51 ng/L   MAGNESIUM    Collection Time: 03/10/23  8:52 AM   Result Value Ref Range    Magnesium 1.9 1.6 - 2.4 mg/dL        EKG: If performed, independent interpretation documented below in the MDM section     RADIOLOGY:  Non-plain film images such as CT, Ultrasound and MRI are read by the radiologist. Plain radiographic images are visualized and preliminarily interpreted by the ED Provider with the findings documented in the MDM section. Interpretation per the Radiologist below, if available at the time of this note:     XR CHEST PORT    Result Date: 3/10/2023  EXAM:  XR CHEST PORT INDICATION: Chest pain COMPARISON: 5/26/2022 TECHNIQUE: portable chest AP view obtained portably at 0840 hours FINDINGS: The cardiac silhouette is within normal limits. The pulmonary vasculature is within normal limits. The lungs and pleural spaces are hyperinflated with coarse markings at the lung bases. The visualized bones and upper abdomen are age-appropriate. Lung hyperinflation Bibasilar subsegmental atelectasis        PROCEDURES   Unless otherwise noted below, none  Procedures     CRITICAL CARE TIME       EMERGENCY DEPARTMENT COURSE and DIFFERENTIAL DIAGNOSIS/MDM   Vitals:    Vitals:    03/10/23 0825 03/10/23 0845   BP: 134/89 (!) 113/52   Pulse: (!) 40 76   Resp: 14 13   Temp: 98.2 °F (36.8 °C)    SpO2: 98%    Weight: 94.7 kg (208 lb 12.4 oz)    Height: 5' 6\" (1.676 m)         Patient was given the following medications:  Medications - No data to display    Medical Decision Making  Patient with a documented heart rate of 40 in triage, bigeminy on the monitor at a rate of 82. Suspect symptomatic bigeminy, consider paroxysmal atrial fibrillation at home. We will keep on continuous cardiac monitoring in ED. Will evaluate for electrolyte derangements including hypokalemia hypomagnesemia. Intermittent nausea, doubt anginal equivalent however will obtain troponin. Will obtain BNP CBC, chest x-ray for overt signs of heart failure.   Will consult cardiology for guidance and management of what I suspect are symptomatic bigeminy. Amount and/or Complexity of Data Reviewed  External Data Reviewed: notes. Labs: ordered. Decision-making details documented in ED Course. Radiology: ordered and independent interpretation performed. Decision-making details documented in ED Course. ECG/medicine tests: ordered and independent interpretation performed. Decision-making details documented in ED Course. Discussion of management or test interpretation with external provider(s): Management discussed with cardiology described in ED Course    Risk  OTC drugs. Prescription drug management. ED Course as of 03/10/23 1001   Fri Mar 10, 2023   8270 EKG obtained shows sinus rhythm with bigeminy, rate of 82, left axis deviation, normal intervals, T wave inversions are noted in inferior leads as well as V3 3 through V6 [WB]   0840 T wave morphology is unchanged from EKG obtained August of last year T wave inversions noted at that time inferior leads and V3 through V6 [WB]   0846 X-ray personally viewed by myself demonstrates stable cardiomediastinal borders, no evidence of edema or effusions [WB]   0847 Patient wants to monitor, when she is not in bigeminy and her native sinus rhythm is approximately 70 at rest blood pressure 113/52 [WB]   0900 I spoke with cardiology NP, will see patient, likely plan increasing metoprolol dose appointment already scheduled with Dr. Rashmi Irwin next week. [WB]   2702 Chest x-ray read as bibasilar subsegmental atelectasis [WB]   4510 Seen by Dr Yazan Aldana, recommends discharge with increased metoprolol to 75 daily, magnesium supplement [WB]   1000 Magnesium is 1.9 potassium is 3.8 we will plan discharge per cardiology plan with increase metoprolol and magnesium [WB]      ED Course User Index  [WB] Clarisse Brooks MD       FINAL IMPRESSION     1. Ventricular bigeminy          DISPOSITION/PLAN   Nikia Salgado's  results have been reviewed with her.   She has been counseled regarding her diagnosis, treatment, and plan. She verbally conveys understanding and agreement of the signs, symptoms, diagnosis, treatment and prognosis and additionally agrees to follow up as discussed. She also agrees with the care-plan and conveys that all of her questions have been answered. I have also provided discharge instructions for her that include: educational information regarding their diagnosis and treatment, and list of reasons why they would want to return to the ED prior to their follow-up appointment, should her condition change. CLINICAL IMPRESSION    Discharge Note: The patient is stable for discharge home. The signs, symptoms, diagnosis, and discharge instructions have been discussed, understanding conveyed, and agreed upon. The patient is to follow up as recommended or return to ER should their symptoms worsen. PATIENT REFERRED TO:  Follow-up Information       Follow up With Specialties Details Why Contact Info    Carmelo Nieto MD Clinical Cardiac Electrophysiology Physician, [address] [place] [address] Vascular Surgery, Cardiovascular Disease Physician  as scheduled Grazyna Champion [phone]  [phone]                DISCHARGE MEDICATIONS:  Current Discharge Medication List        START taking these medications    Details   magnesium oxide (MAG-OX) 400 mg tablet Take 1 Tablet by mouth daily for 30 days. Qty: 30 Tablet, Refills: 0  Start date: 3/10/2023, End date: 4/9/2023           CONTINUE these medications which have CHANGED    Details   metoprolol succinate (TOPROL-XL) 25 mg XL tablet Take 3 Tablets by mouth nightly for 7 days. Qty: 90 Tablet, Refills: 0  Start date: 3/10/2023, End date: 3/17/2023               DISCONTINUED MEDICATIONS:  Current Discharge Medication List          I am the Primary Clinician of Record. Feliciano Cheatham MD (electronically signed)    (Please note that parts of this dictation were completed with voice recognition software.  Quite often unanticipated grammatical, syntax, homophones, and other interpretive errors are inadvertently transcribed by the computer software. Please disregards these errors.  Please excuse any errors that have escaped final proofreading.)

## 2023-03-10 NOTE — CONSULTS
[facility]  [address]  [phone]  [email or web address]. [email or web address]       CARDIOLOGY CONSULTATION    PLEASE NOTE THAT WE CONFIRMED WITH THE REFERRING PHYSICIAN PRIOR TO SEEING THE PATIENT THAT THE PATIENT IS BEING REFERRED FOR [address] EVALUATION AND FOR LONG-TERM ONGOING CARDIAC CARE     Date of  Admission: 3/10/2023  8:26 AM     Admission type:Emergency   Primary Care Physician:None     Attending Provider: Silvia Pineda MD  Cardiology Provider: Dr. Norma Zuñiga / Dr. Romaine Viera for EP    CC/REASON FOR CONSULT: symptomatic bigeminy     Subjective:     Svetlana Maravilla is a 66 y.o. female seen in ED for symptomatic bigeminy. She has a PMHx of PAF s/p AF ablation, symptomatic PVCs, obesity, MAYANK on CPAP. Presents to ED with 2 weeks of increased palpitation symptoms. Yesterday could barely use the bathroom because palpitations were very limiting. Got very concerned because her iWatch reported her HR in the range of 30s-110s. In ED, found to be in ventricular bigeminy, with controlled rates. Unremarkable chemistries, CBC. Cardiology consulted for further evaluation. She is very tearful during visit because she remains uncomfortable with \"the chaos in my chest\". Recently seen by our practice in 12/2022 and had Toprol dose increased to 50 mg, which did help until about 2 weeks ago. Denies any caffeine intake, alcohol intake. Has MAYANK uses CPAP nightly, but not had settings checked in a while.       Patient Active Problem List    Diagnosis Date Noted    Afib ([address] 06/20/2022    Paroxysmal atrial flutter ([address] 04/05/2022    AF (atrial fibrillation) ([address] 04/04/2022    Severe obesity ([address] 07/10/2020    MAYANK on CPAP 10/17/2019    Atrial fibrillation ([address] 03/04/2018    Irregular heart beat 12/07/2015    PAF (paroxysmal atrial fibrillation) ([address] 11/28/2015    Lower urinary tract infectious disease 11/28/2015    Allergic drug rash due to sulfonamide 11/28/2015      None  Past Medical History:   Diagnosis Date    Atrial fibrillation ([address]     Long term current use of anticoagulant therapy     Eliquis    MAYANK (obstructive sleep apnea)       Social History     Socioeconomic History    Marital status:    Tobacco Use    Smoking status: Every Day     Packs/day: 1.00     Years: 35.00     Pack years: 35.00     Types: Cigarettes    Smokeless tobacco: Never    Tobacco comments:     1 pack a day    Vaping Use    Vaping Use: Never used   Substance and Sexual Activity    Alcohol use: Not Currently    Drug use: No    Sexual activity: Not Currently     Allergies   Allergen Reactions    Beef Containing Products Hives    Pork/Porcine Containing Products Hives    Erythromycin Other (comments)    Macrobid [Nitrofurantoin Monohyd/M-Cryst] Hives    Pcn [Penicillins] Hives      Family History   Problem Relation Age of Onset    Other Other         no known FH of CAD    Hypertension Father     Atrial Fibrillation Father       No current facility-administered medications for this encounter. Current Outpatient Medications   Medication Sig    metoprolol succinate (TOPROL-XL) 25 mg XL tablet TAKE 1 TABLET BY MOUTH EVERY DAY AT NIGHT    Eliquis 5 mg tablet TAKE 1 TABLET BY MOUTH TWICE A DAY        Review of Symptoms:   11 systems reviewed, negative other than as stated in the HPI        Objective:      Visit Vitals  /89   Pulse (!) 40   Temp 98.2 °F (36.8 °C)   Resp 14   Ht 5' 6\" (1.676 m)   Wt 94.7 kg (208 lb 12.4 oz)   SpO2 98%   BMI 33.70 kg/m²         Physical Exam:  General: Well developed, in no acute distress, cooperative and alert. Anxious appearing. HEENT: No carotid bruits, PEERL, EOM intact. Heart:  reg rate and rhythm; frequent ectopics; normal S1/S2; no murmurs  Respiratory: Clear bilaterally x 4, no wheezing or rales  Abdomen:   Soft, non-tender, no distention, no masses. + BS. Extremities:  Normal cap refill, no cyanosis, atraumatic. No edema.   Neuro: A&Ox3, speech clear  Skin: Skin color is normal. Non diaphoretic  Vascular: 2+ pulses symmetric in all extremities    Data Review:   Recent Labs     03/10/23  0852   WBC 7.7   HGB 15.8   HCT 48.8*        No results for input(s): NA, K, CL, CO2, GLU, BUN, CREA, CA, MG, PHOS, ALB, TBIL, TBILI, ALT, INR, INREXT in the last 72 hours. No lab exists for component: SGOT,  BNP    No results for input(s): TROIQ, CPK, CKMB in the last 72 hours. No intake or output data in the 24 hours ending 03/10/23 0926     Cardiographics    Telemetry: sinus rhythm with ventricular bigeminy    ECG: sinus rhythm with ventricular begiminy, vr 82 bpm    Radiology Results in the past 24 hours:  XR CHEST PORT    Result Date: 3/10/2023  Lung hyperinflation Bibasilar subsegmental atelectasis         Assessment:      Symptomatic bigeminy  PAF       Plan:     Bigeminy  Known hx of PVCs; previous monitor with 3% PVC burden  Recurrent symptom in the past 2 weeks  Will increase Toprol 75 mg daily  Will discuss PVC ablation as OP  She has MAYANK and does use a CPAP mask, recommended to have this titrated  Discussed minimizing alcohol, caffeine intake  Can take OTC Magnesium supplement    2. PAF  Hx of AF ablation in 6/2022  No recurrence  Continue BB, Bridgetis    Thank you for this consultation. Okay to dc home from cardiac standpoint. She has fu with our office next week. Sahil Dodson NP  CC:None    Patient seen and examined by me with nurse practitioner. I personally performed all components of the history, physical, and medical decision making and agree with the assessment and plan with minor modifications as noted. Pt with symptomatic RVOT PVCS. Will increase bb dose. Scheduled to see me next week. Will discuss PVC abl at that time.     Quan Lechuga MD, [facility]

## 2023-03-16 RX ORDER — APIXABAN 5 MG/1
TABLET, FILM COATED ORAL
Qty: 180 TABLET | Refills: 1 | Status: SHIPPED | OUTPATIENT
Start: 2023-03-16

## 2023-10-23 ENCOUNTER — APPOINTMENT (OUTPATIENT)
Facility: HOSPITAL | Age: 63
End: 2023-10-23
Payer: COMMERCIAL

## 2023-10-23 ENCOUNTER — HOSPITAL ENCOUNTER (EMERGENCY)
Facility: HOSPITAL | Age: 63
Discharge: HOME | End: 2023-10-23
Attending: EMERGENCY MEDICINE | Admitting: EMERGENCY MEDICINE
Payer: COMMERCIAL

## 2023-10-23 VITALS
SYSTOLIC BLOOD PRESSURE: 132 MMHG | RESPIRATION RATE: 16 BRPM | DIASTOLIC BLOOD PRESSURE: 65 MMHG | HEART RATE: 76 BPM | TEMPERATURE: 98.1 F | OXYGEN SATURATION: 98 %

## 2023-10-23 DIAGNOSIS — M54.41 ACUTE BILATERAL LOW BACK PAIN WITH RIGHT-SIDED SCIATICA: Primary | ICD-10-CM

## 2023-10-23 PROCEDURE — 99284 EMERGENCY DEPT VISIT MOD MDM: CPT

## 2023-10-23 PROCEDURE — 6370000000 HC RX 637 (ALT 250 FOR IP)

## 2023-10-23 PROCEDURE — 96372 THER/PROPH/DIAG INJ SC/IM: CPT

## 2023-10-23 PROCEDURE — 72100 X-RAY EXAM L-S SPINE 2/3 VWS: CPT

## 2023-10-23 PROCEDURE — 6360000002 HC RX W HCPCS

## 2023-10-23 RX ORDER — NAPROXEN 500 MG/1
500 TABLET ORAL 2 TIMES DAILY PRN
Qty: 20 TABLET | Refills: 0 | Status: SHIPPED | OUTPATIENT
Start: 2023-10-23 | End: 2023-11-02

## 2023-10-23 RX ORDER — PREDNISONE 50 MG/1
50 TABLET ORAL DAILY
Qty: 5 TABLET | Refills: 0 | Status: SHIPPED | OUTPATIENT
Start: 2023-10-23 | End: 2023-10-28

## 2023-10-23 RX ORDER — CYCLOBENZAPRINE HCL 10 MG
10 TABLET ORAL
Status: COMPLETED | OUTPATIENT
Start: 2023-10-23 | End: 2023-10-23

## 2023-10-23 RX ORDER — CYCLOBENZAPRINE HCL 10 MG
10 TABLET ORAL 3 TIMES DAILY PRN
Qty: 21 TABLET | Refills: 0 | Status: SHIPPED | OUTPATIENT
Start: 2023-10-23 | End: 2023-11-02

## 2023-10-23 RX ORDER — ACETAMINOPHEN 500 MG
1000 TABLET ORAL
Qty: 60 TABLET | Refills: 0 | Status: SHIPPED | OUTPATIENT
Start: 2023-10-23 | End: 2023-11-22

## 2023-10-23 RX ORDER — KETOROLAC TROMETHAMINE 30 MG/ML
15 INJECTION, SOLUTION INTRAMUSCULAR; INTRAVENOUS ONCE
Status: COMPLETED | OUTPATIENT
Start: 2023-10-23 | End: 2023-10-23

## 2023-10-23 RX ORDER — LIDOCAINE 4 G/G
1 PATCH TOPICAL
Status: DISCONTINUED | OUTPATIENT
Start: 2023-10-23 | End: 2023-10-23 | Stop reason: HOSPADM

## 2023-10-23 RX ADMIN — CYCLOBENZAPRINE 10 MG: 10 TABLET, FILM COATED ORAL at 14:01

## 2023-10-23 RX ADMIN — KETOROLAC TROMETHAMINE 15 MG: 30 INJECTION, SOLUTION INTRAMUSCULAR at 14:01

## 2023-10-23 ASSESSMENT — ENCOUNTER SYMPTOMS
SHORTNESS OF BREATH: 0
BACK PAIN: 1
CONSTIPATION: 0
VOMITING: 0
NAUSEA: 0
DIARRHEA: 0

## 2023-10-23 NOTE — ED TRIAGE NOTES
Pt c/o lower back pain, right sided , radiating down right leg, denies numbness/tingling to leg, pt seen and placed on steroids, denies incontinence, pt fell 2 weeks ago

## 2023-10-23 NOTE — ED PROVIDER NOTES
5 days, Disp-5 tablet, R-0Normal      cyclobenzaprine (FLEXERIL) 10 MG tablet Take 1 tablet by mouth 3 times daily as needed for Muscle spasms, Disp-21 tablet, R-0Normal               (Please note that portions of this note were completed with a voice recognition program.  Efforts were made to edit the dictations but occasionally words are mis-transcribed. )    Mario Mohan PA-C (electronically signed)  Emergency Attending Physician / Physician Assistant / Nurse Practitioner             Mario Mohan PA-C  10/23/23 7106

## 2023-10-23 NOTE — ED NOTES
Patient discharged by PA. Results and discharge instructions reviewed with the patient by PA. Patient verbalizes understanding. Patient discharged home, stable, ambulatory, in no acute distress.          Torie Valera RN  10/23/23 [id number]

## 2023-10-23 NOTE — DISCHARGE INSTRUCTIONS
As discussed, your xray is negative for any emergent findings. Follow up with your PCP and ortho for further management. Return to the ER if you experience severe or worsening symptoms, including worsening pain, bowel or bladder changes, or fever.

## 2024-02-26 ENCOUNTER — APPOINTMENT (OUTPATIENT)
Facility: HOSPITAL | Age: 64
End: 2024-02-26
Payer: COMMERCIAL

## 2024-02-26 ENCOUNTER — HOSPITAL ENCOUNTER (EMERGENCY)
Facility: HOSPITAL | Age: 64
Discharge: HOME | End: 2024-02-26
Attending: EMERGENCY MEDICINE
Payer: COMMERCIAL

## 2024-02-26 VITALS
TEMPERATURE: 98.2 F | OXYGEN SATURATION: 98 % | SYSTOLIC BLOOD PRESSURE: 126 MMHG | HEART RATE: 83 BPM | RESPIRATION RATE: 14 BRPM | DIASTOLIC BLOOD PRESSURE: 58 MMHG

## 2024-02-26 DIAGNOSIS — I49.3 FREQUENT PVCS: Primary | ICD-10-CM

## 2024-02-26 LAB
ALBUMIN SERPL-MCNC: 3.5 G/DL (ref 3.5–5)
ALBUMIN/GLOB SERPL: 0.9 (ref 1.1–2.2)
ALP SERPL-CCNC: 74 U/L (ref 45–117)
ALT SERPL-CCNC: 20 U/L (ref 12–78)
ANION GAP SERPL CALC-SCNC: 3 MMOL/L (ref 5–15)
AST SERPL-CCNC: 20 U/L (ref 15–37)
BASOPHILS # BLD: 0.1 K/UL (ref 0–0.1)
BASOPHILS NFR BLD: 1 % (ref 0–1)
BILIRUB SERPL-MCNC: 0.4 MG/DL (ref 0.2–1)
BUN SERPL-MCNC: 17 MG/DL (ref 6–20)
BUN/CREAT SERPL: 22 (ref 12–20)
CALCIUM SERPL-MCNC: 9.3 MG/DL (ref 8.5–10.1)
CHLORIDE SERPL-SCNC: 111 MMOL/L (ref 97–108)
CO2 SERPL-SCNC: 25 MMOL/L (ref 21–32)
CREAT SERPL-MCNC: 0.76 MG/DL (ref 0.55–1.02)
DIFFERENTIAL METHOD BLD: ABNORMAL
EOSINOPHIL # BLD: 0.2 K/UL (ref 0–0.4)
EOSINOPHIL NFR BLD: 2 % (ref 0–7)
ERYTHROCYTE [DISTWIDTH] IN BLOOD BY AUTOMATED COUNT: 12.6 % (ref 11.5–14.5)
GLOBULIN SER CALC-MCNC: 3.8 G/DL (ref 2–4)
GLUCOSE SERPL-MCNC: 91 MG/DL (ref 65–100)
HCT VFR BLD AUTO: 49.2 % (ref 35–47)
HGB BLD-MCNC: 15.7 G/DL (ref 11.5–16)
IMM GRANULOCYTES # BLD AUTO: 0 K/UL (ref 0–0.04)
IMM GRANULOCYTES NFR BLD AUTO: 0 % (ref 0–0.5)
LYMPHOCYTES # BLD: 2.1 K/UL (ref 0.8–3.5)
LYMPHOCYTES NFR BLD: 22 % (ref 12–49)
MAGNESIUM SERPL-MCNC: 2.1 MG/DL (ref 1.6–2.4)
MCH RBC QN AUTO: 31.3 PG (ref 26–34)
MCHC RBC AUTO-ENTMCNC: 31.9 G/DL (ref 30–36.5)
MCV RBC AUTO: 98 FL (ref 80–99)
MONOCYTES # BLD: 0.6 K/UL (ref 0–1)
MONOCYTES NFR BLD: 7 % (ref 5–13)
NEUTS SEG # BLD: 6.6 K/UL (ref 1.8–8)
NEUTS SEG NFR BLD: 68 % (ref 32–75)
NRBC # BLD: 0 K/UL (ref 0–0.01)
NRBC BLD-RTO: 0 PER 100 WBC
PLATELET # BLD AUTO: 249 K/UL (ref 150–400)
PMV BLD AUTO: 10.2 FL (ref 8.9–12.9)
POTASSIUM SERPL-SCNC: 4.2 MMOL/L (ref 3.5–5.1)
PROT SERPL-MCNC: 7.3 G/DL (ref 6.4–8.2)
RBC # BLD AUTO: 5.02 M/UL (ref 3.8–5.2)
SODIUM SERPL-SCNC: 139 MMOL/L (ref 136–145)
TROPONIN I SERPL HS-MCNC: 5 NG/L (ref 0–51)
TSH SERPL DL<=0.05 MIU/L-ACNC: 3.14 UIU/ML (ref 0.36–3.74)
WBC # BLD AUTO: 9.6 K/UL (ref 3.6–11)

## 2024-02-26 PROCEDURE — 71046 X-RAY EXAM CHEST 2 VIEWS: CPT

## 2024-02-26 PROCEDURE — 2580000003 HC RX 258: Performed by: EMERGENCY MEDICINE

## 2024-02-26 PROCEDURE — 85025 COMPLETE CBC W/AUTO DIFF WBC: CPT

## 2024-02-26 PROCEDURE — 84484 ASSAY OF TROPONIN QUANT: CPT

## 2024-02-26 PROCEDURE — 83735 ASSAY OF MAGNESIUM: CPT

## 2024-02-26 PROCEDURE — 99285 EMERGENCY DEPT VISIT HI MDM: CPT

## 2024-02-26 PROCEDURE — 94761 N-INVAS EAR/PLS OXIMETRY MLT: CPT

## 2024-02-26 PROCEDURE — 36415 COLL VENOUS BLD VENIPUNCTURE: CPT

## 2024-02-26 PROCEDURE — 84443 ASSAY THYROID STIM HORMONE: CPT

## 2024-02-26 PROCEDURE — 93005 ELECTROCARDIOGRAM TRACING: CPT | Performed by: EMERGENCY MEDICINE

## 2024-02-26 PROCEDURE — 80053 COMPREHEN METABOLIC PANEL: CPT

## 2024-02-26 RX ORDER — 0.9 % SODIUM CHLORIDE 0.9 %
500 INTRAVENOUS SOLUTION INTRAVENOUS ONCE
Status: COMPLETED | OUTPATIENT
Start: 2024-02-26 | End: 2024-02-26

## 2024-02-26 RX ORDER — METOPROLOL SUCCINATE 50 MG/1
TABLET, EXTENDED RELEASE ORAL
Qty: 45 TABLET | Refills: 1 | Status: SHIPPED | OUTPATIENT
Start: 2024-02-26

## 2024-02-26 RX ADMIN — SODIUM CHLORIDE 500 ML: 9 INJECTION, SOLUTION INTRAVENOUS at 19:11

## 2024-02-26 ASSESSMENT — PAIN SCALES - GENERAL: PAINLEVEL_OUTOF10: 0

## 2024-02-26 ASSESSMENT — LIFESTYLE VARIABLES
HOW MANY STANDARD DRINKS CONTAINING ALCOHOL DO YOU HAVE ON A TYPICAL DAY: PATIENT DOES NOT DRINK
HOW OFTEN DO YOU HAVE A DRINK CONTAINING ALCOHOL: NEVER

## 2024-02-27 LAB
EKG ATRIAL RATE: 88 BPM
EKG DIAGNOSIS: NORMAL
EKG P AXIS: 66 DEGREES
EKG P-R INTERVAL: 142 MS
EKG Q-T INTERVAL: 380 MS
EKG QRS DURATION: 82 MS
EKG QTC CALCULATION (BAZETT): 459 MS
EKG R AXIS: -44 DEGREES
EKG T AXIS: 258 DEGREES
EKG VENTRICULAR RATE: 88 BPM

## 2024-02-27 NOTE — ED NOTES
Patient to ED for irregular heart rate.  Ventricular bigeminy noted on cardiac monitor.  Congested cough.  Patient states she is a smoker but it has never lasted this long.  Alert. Oriented x 4.  Respirations not labored.  IV placed and labs sent at this time.    1910  Report given to night shift ED RN.

## 2024-02-27 NOTE — ED NOTES
1916: Bedside and Verbal shift change report given to Trinh (oncoming nurse) by Libby (offgoing nurse). Report included the following information Nurse Handoff Report.      2020: Cardiology consult called by unit secretary Tiff    2057: Patient ambulatory to the bathroom at this time.    2127: Discharge instructions given to patient by Trinh.  Verbalized understanding of instructions.  Patient discharged without difficulty.  Patient discharged in stable condition via ambulation accompanied by self.

## 2024-02-27 NOTE — ED PROVIDER NOTES
[facility] EMERGENCY DEPT  EMERGENCY DEPARTMENT ENCOUNTER       Pt Name: Eladia Purcell  MRN: [id number]  Birthdate 1960  Date of evaluation: 2/26/2024  Provider: Pro Villeda DO   PCP: No primary care provider on file.  Note Started: 8:30 PM EST 2/26/24     CHIEF COMPLAINT       Chief Complaint   Patient presents with    Irregular Heart Beat     Feels like heart beating irregular mid morning. Denies pain with this. Pt has Afib-but does have left shoulder pain sharp every once in a while that radiates to her head. Not on a blood thinner. Had Ablation June \"last year\"         HISTORY OF PRESENT ILLNESS: 1 or more elements      History From: Patient, History limited by: none     Eladia Purcell is a 64 y.o. female presents to the emergency department for irregular heartbeat.       Please See MDM for Additional Details of the HPI/PMH  Nursing Notes were all reviewed and agreed with or any disagreements were addressed in the HPI.     REVIEW OF SYSTEMS        Positives and Pertinent negatives as per HPI.    PAST HISTORY     Past Medical History:  Past Medical History:   Diagnosis Date    Atrial fibrillation (HCC)     Long term current use of anticoagulant therapy     Eliquis    ARLET (obstructive sleep apnea)        Past Surgical History:  Past Surgical History:   Procedure Laterality Date    TUBAL LIGATION         Family History:  Family History   Problem Relation Age of Onset    Atrial Fibrillation Father     Other Other         no known FH of CAD    Hypertension Father        Social History:  Social History     Tobacco Use    Smoking status: Every Day     Current packs/day: 1.00     Types: Cigarettes    Smokeless tobacco: Never   Substance Use Topics    Alcohol use: Not Currently    Drug use: No       Allergies:  Allergies   Allergen Reactions    Erythromycin Other (See Comments)    Nitrofurantoin Hives    Penicillins Hives    Alpha-Gal Rash       CURRENT MEDICATIONS      Previous Medications    ACETAMINOPHEN (TYLENOL)

## 2024-03-10 ENCOUNTER — HOSPITAL ENCOUNTER (EMERGENCY)
Facility: HOSPITAL | Age: 64
Discharge: HOME | End: 2024-03-10
Attending: EMERGENCY MEDICINE
Payer: COMMERCIAL

## 2024-03-10 ENCOUNTER — APPOINTMENT (OUTPATIENT)
Facility: HOSPITAL | Age: 64
End: 2024-03-10
Payer: COMMERCIAL

## 2024-03-10 VITALS
DIASTOLIC BLOOD PRESSURE: 62 MMHG | HEART RATE: 66 BPM | BODY MASS INDEX: 34.76 KG/M2 | OXYGEN SATURATION: 99 % | RESPIRATION RATE: 14 BRPM | TEMPERATURE: 97.5 F | WEIGHT: 215.39 LBS | SYSTOLIC BLOOD PRESSURE: 127 MMHG

## 2024-03-10 DIAGNOSIS — G50.0 TRIGEMINAL NEURALGIA: Primary | ICD-10-CM

## 2024-03-10 LAB
ALBUMIN SERPL-MCNC: 3.7 G/DL (ref 3.5–5)
ALBUMIN/GLOB SERPL: 1 (ref 1.1–2.2)
ALP SERPL-CCNC: 81 U/L (ref 45–117)
ALT SERPL-CCNC: 21 U/L (ref 12–78)
ANION GAP SERPL CALC-SCNC: 4 MMOL/L (ref 5–15)
AST SERPL-CCNC: 10 U/L (ref 15–37)
BASOPHILS # BLD: 0 K/UL (ref 0–0.1)
BASOPHILS NFR BLD: 0 % (ref 0–1)
BILIRUB SERPL-MCNC: 0.5 MG/DL (ref 0.2–1)
BUN SERPL-MCNC: 18 MG/DL (ref 6–20)
BUN/CREAT SERPL: 21 (ref 12–20)
CALCIUM SERPL-MCNC: 9.3 MG/DL (ref 8.5–10.1)
CHLORIDE SERPL-SCNC: 107 MMOL/L (ref 97–108)
CO2 SERPL-SCNC: 27 MMOL/L (ref 21–32)
CREAT SERPL-MCNC: 0.86 MG/DL (ref 0.55–1.02)
DIFFERENTIAL METHOD BLD: ABNORMAL
EOSINOPHIL # BLD: 0.2 K/UL (ref 0–0.4)
EOSINOPHIL NFR BLD: 3 % (ref 0–7)
ERYTHROCYTE [DISTWIDTH] IN BLOOD BY AUTOMATED COUNT: 12.4 % (ref 11.5–14.5)
GLOBULIN SER CALC-MCNC: 3.6 G/DL (ref 2–4)
GLUCOSE SERPL-MCNC: 102 MG/DL (ref 65–100)
HCT VFR BLD AUTO: 49.1 % (ref 35–47)
HGB BLD-MCNC: 15.8 G/DL (ref 11.5–16)
IMM GRANULOCYTES # BLD AUTO: 0.1 K/UL (ref 0–0.04)
IMM GRANULOCYTES NFR BLD AUTO: 1 % (ref 0–0.5)
LYMPHOCYTES # BLD: 1.7 K/UL (ref 0.8–3.5)
LYMPHOCYTES NFR BLD: 19 % (ref 12–49)
MAGNESIUM SERPL-MCNC: 2.2 MG/DL (ref 1.6–2.4)
MCH RBC QN AUTO: 32.1 PG (ref 26–34)
MCHC RBC AUTO-ENTMCNC: 32.2 G/DL (ref 30–36.5)
MCV RBC AUTO: 99.8 FL (ref 80–99)
MONOCYTES # BLD: 0.7 K/UL (ref 0–1)
MONOCYTES NFR BLD: 7 % (ref 5–13)
NEUTS SEG # BLD: 6.3 K/UL (ref 1.8–8)
NEUTS SEG NFR BLD: 70 % (ref 32–75)
NRBC # BLD: 0 K/UL (ref 0–0.01)
NRBC BLD-RTO: 0 PER 100 WBC
PLATELET # BLD AUTO: 226 K/UL (ref 150–400)
PMV BLD AUTO: 10.4 FL (ref 8.9–12.9)
POTASSIUM SERPL-SCNC: 4 MMOL/L (ref 3.5–5.1)
PROT SERPL-MCNC: 7.3 G/DL (ref 6.4–8.2)
RBC # BLD AUTO: 4.92 M/UL (ref 3.8–5.2)
SODIUM SERPL-SCNC: 138 MMOL/L (ref 136–145)
WBC # BLD AUTO: 9.1 K/UL (ref 3.6–11)

## 2024-03-10 PROCEDURE — 85025 COMPLETE CBC W/AUTO DIFF WBC: CPT

## 2024-03-10 PROCEDURE — 36415 COLL VENOUS BLD VENIPUNCTURE: CPT

## 2024-03-10 PROCEDURE — 83735 ASSAY OF MAGNESIUM: CPT

## 2024-03-10 PROCEDURE — 70450 CT HEAD/BRAIN W/O DYE: CPT

## 2024-03-10 PROCEDURE — 99284 EMERGENCY DEPT VISIT MOD MDM: CPT

## 2024-03-10 PROCEDURE — 80053 COMPREHEN METABOLIC PANEL: CPT

## 2024-03-10 RX ORDER — GABAPENTIN 300 MG/1
300 CAPSULE ORAL 3 TIMES DAILY
Qty: 90 CAPSULE | Refills: 0 | Status: SHIPPED | OUTPATIENT
Start: 2024-03-10 | End: 2024-04-09

## 2024-03-10 ASSESSMENT — PAIN SCALES - GENERAL: PAINLEVEL_OUTOF10: 10

## 2024-03-10 NOTE — ED PROVIDER NOTES
[facility] EMERGENCY DEPT  EMERGENCY DEPARTMENT ENCOUNTER       Pt Name: Eladia Purcell  MRN: [id number]  Birthdate 1960  Date of evaluation: 3/10/2024  Provider: Pro Villeda DO   PCP: No primary care provider on file.  Note Started: 5:04 PM EDT 3/10/24     CHIEF COMPLAINT       Chief Complaint   Patient presents with    Facial Pain     Pain on left side of face that comes and goes in intensity for three weeks; the doctor has ordered a CT but it won't occur until this week coming; with no prescribed meds.         HISTORY OF PRESENT ILLNESS: 1 or more elements      History From: Patient, History limited by: none     Eladia Purcell is a 64 y.o. female presents to the emergency department by private vehicle for evaluation of intermittent facial pain.       Please See MDM for Additional Details of the HPI/PMH  Nursing Notes were all reviewed and agreed with or any disagreements were addressed in the HPI.     REVIEW OF SYSTEMS        Positives and Pertinent negatives as per HPI.    PAST HISTORY     Past Medical History:  Past Medical History:   Diagnosis Date    Atrial fibrillation (HCC)     Long term current use of anticoagulant therapy     Eliquis    ARLET (obstructive sleep apnea)        Past Surgical History:  Past Surgical History:   Procedure Laterality Date    TUBAL LIGATION         Family History:  Family History   Problem Relation Age of Onset    Atrial Fibrillation Father     Other Other         no known FH of CAD    Hypertension Father        Social History:  Social History     Tobacco Use    Smoking status: Every Day     Current packs/day: 1.00     Types: Cigarettes    Smokeless tobacco: Never   Substance Use Topics    Alcohol use: Not Currently    Drug use: No       Allergies:  Allergies   Allergen Reactions    Erythromycin Other (See Comments)    Nitrofurantoin Hives    Penicillins Hives    Alpha-Gal Rash       CURRENT MEDICATIONS      Discharge Medication List as of 3/10/2024 12:50 PM        CONTINUE these

## 2024-03-10 NOTE — ED NOTES
Reviewed discharge paperwork with patient, patient denies any further questions at this time. Pt is A&Ox4 and shows no s/s of acute distress, patient is stable for discharge.

## 2024-03-10 NOTE — DISCHARGE INSTRUCTIONS
Neurontin is a neuropathic pain medication.  The prescription that I have written for you is different than the way I want you to take it.  Start off with taking 300 mg at bedtime.  If after 2 days you are still continuing to have pain and discomfort you can increase dosing to once in the morning and once at night or double the dose at bedtime.  A third dose can be added after a few days if you are tolerating the medicine he continued to have pain and can be taken as 1 in the morning 1 in the afternoon or 1 at night you may take 1 in the morning and 2 at night or take 3 at bedtime.  For any further dosing it should be done by your primary care physician or physician that we have asked you to follow-up with.   No

## 2024-04-03 ENCOUNTER — HOSPITAL ENCOUNTER (OUTPATIENT)
Facility: HOSPITAL | Age: 64
Discharge: HOME | End: 2024-04-06
Payer: COMMERCIAL

## 2024-04-03 DIAGNOSIS — G50.0 TRIGEMINAL NEURALGIA: ICD-10-CM

## 2024-04-03 PROCEDURE — A9579 GAD-BASE MR CONTRAST NOS,1ML: HCPCS

## 2024-04-03 PROCEDURE — 6360000004 HC RX CONTRAST MEDICATION

## 2024-04-03 PROCEDURE — 70553 MRI BRAIN STEM W/O & W/DYE: CPT

## 2024-04-03 RX ADMIN — GADOTERIDOL 20 ML: 279.3 INJECTION, SOLUTION INTRAVENOUS at 17:07

## 2024-11-15 ENCOUNTER — HOSPITAL ENCOUNTER (EMERGENCY)
Facility: HOSPITAL | Age: 64
Discharge: HOME | End: 2024-11-15
Attending: EMERGENCY MEDICINE
Payer: COMMERCIAL

## 2024-11-15 ENCOUNTER — APPOINTMENT (OUTPATIENT)
Facility: HOSPITAL | Age: 64
End: 2024-11-15
Payer: COMMERCIAL

## 2024-11-15 VITALS
OXYGEN SATURATION: 97 % | TEMPERATURE: 97.7 F | RESPIRATION RATE: 16 BRPM | BODY MASS INDEX: 34.76 KG/M2 | SYSTOLIC BLOOD PRESSURE: 128 MMHG | HEART RATE: 80 BPM | DIASTOLIC BLOOD PRESSURE: 66 MMHG | HEIGHT: 66 IN

## 2024-11-15 DIAGNOSIS — M54.50 ACUTE RIGHT-SIDED LOW BACK PAIN WITHOUT SCIATICA: Primary | ICD-10-CM

## 2024-11-15 DIAGNOSIS — N30.00 ACUTE CYSTITIS WITHOUT HEMATURIA: ICD-10-CM

## 2024-11-15 LAB
APPEARANCE UR: CLEAR
BACTERIA URNS QL MICRO: ABNORMAL /HPF
BILIRUB UR QL: NEGATIVE
COLOR UR: ABNORMAL
EPITH CASTS URNS QL MICRO: ABNORMAL /LPF
GLUCOSE UR STRIP.AUTO-MCNC: NEGATIVE MG/DL
HGB UR QL STRIP: ABNORMAL
HYALINE CASTS URNS QL MICRO: ABNORMAL /LPF (ref 0–5)
KETONES UR QL STRIP.AUTO: NEGATIVE MG/DL
LEUKOCYTE ESTERASE UR QL STRIP.AUTO: ABNORMAL
NITRITE UR QL STRIP.AUTO: POSITIVE
PH UR STRIP: 5.5 (ref 5–8)
PROT UR STRIP-MCNC: NEGATIVE MG/DL
RBC #/AREA URNS HPF: ABNORMAL /HPF (ref 0–5)
SP GR UR REFRACTOMETRY: 1.01 (ref 1–1.03)
SPECIMEN HOLD: NORMAL
UROBILINOGEN UR QL STRIP.AUTO: 1 EU/DL (ref 0.2–1)
WBC URNS QL MICRO: ABNORMAL /HPF (ref 0–4)

## 2024-11-15 PROCEDURE — 99284 EMERGENCY DEPT VISIT MOD MDM: CPT

## 2024-11-15 PROCEDURE — 81001 URINALYSIS AUTO W/SCOPE: CPT

## 2024-11-15 PROCEDURE — 72131 CT LUMBAR SPINE W/O DYE: CPT

## 2024-11-15 PROCEDURE — 6370000000 HC RX 637 (ALT 250 FOR IP)

## 2024-11-15 PROCEDURE — 96372 THER/PROPH/DIAG INJ SC/IM: CPT

## 2024-11-15 PROCEDURE — 6360000002 HC RX W HCPCS

## 2024-11-15 RX ORDER — LIDOCAINE 4 G/G
1 PATCH TOPICAL
Status: DISCONTINUED | OUTPATIENT
Start: 2024-11-15 | End: 2024-11-15 | Stop reason: HOSPADM

## 2024-11-15 RX ORDER — METHOCARBAMOL 500 MG/1
1500 TABLET, FILM COATED ORAL
Status: COMPLETED | OUTPATIENT
Start: 2024-11-15 | End: 2024-11-15

## 2024-11-15 RX ORDER — KETOROLAC TROMETHAMINE 30 MG/ML
30 INJECTION, SOLUTION INTRAMUSCULAR; INTRAVENOUS
Status: COMPLETED | OUTPATIENT
Start: 2024-11-15 | End: 2024-11-15

## 2024-11-15 RX ORDER — SULFAMETHOXAZOLE AND TRIMETHOPRIM 800; 160 MG/1; MG/1
1 TABLET ORAL 2 TIMES DAILY
Qty: 10 TABLET | Refills: 0 | Status: SHIPPED | OUTPATIENT
Start: 2024-11-15 | End: 2024-11-20

## 2024-11-15 RX ORDER — CEPHALEXIN 500 MG/1
500 CAPSULE ORAL 2 TIMES DAILY
Qty: 10 CAPSULE | Refills: 0 | Status: SHIPPED | OUTPATIENT
Start: 2024-11-15 | End: 2024-11-15 | Stop reason: ALTCHOICE

## 2024-11-15 RX ADMIN — METHOCARBAMOL 1500 MG: 500 TABLET ORAL at 14:55

## 2024-11-15 RX ADMIN — KETOROLAC TROMETHAMINE 30 MG: 30 INJECTION, SOLUTION INTRAMUSCULAR at 15:00

## 2024-11-15 ASSESSMENT — PAIN - FUNCTIONAL ASSESSMENT
PAIN_FUNCTIONAL_ASSESSMENT: PREVENTS OR INTERFERES SOME ACTIVE ACTIVITIES AND ADLS
PAIN_FUNCTIONAL_ASSESSMENT: NONE - DENIES PAIN

## 2024-11-15 ASSESSMENT — PAIN DESCRIPTION - LOCATION: LOCATION: BACK

## 2024-11-15 ASSESSMENT — PAIN DESCRIPTION - ORIENTATION: ORIENTATION: RIGHT;LOWER

## 2024-11-15 ASSESSMENT — PAIN DESCRIPTION - PAIN TYPE: TYPE: ACUTE PAIN

## 2024-11-15 ASSESSMENT — PAIN DESCRIPTION - DESCRIPTORS
DESCRIPTORS: DULL;CRAMPING;SHARP
DESCRIPTORS: ACHING;SHARP

## 2024-11-15 ASSESSMENT — PAIN DESCRIPTION - FREQUENCY: FREQUENCY: CONTINUOUS

## 2024-11-15 ASSESSMENT — PAIN SCALES - GENERAL: PAINLEVEL_OUTOF10: 10

## 2024-11-15 ASSESSMENT — PAIN DESCRIPTION - ONSET: ONSET: ON-GOING

## 2024-11-15 NOTE — DISCHARGE INSTRUCTIONS
/hpf    Hyaline Casts, UA 0-2 0 - 5 /lpf   Urine Culture Hold Sample    Collection Time: 11/15/24  4:04 PM    Specimen: Urine   Result Value Ref Range    Specimen HOld        Urine on hold in Microbiology dept for 2 days.  If unpreserved urine is submitted, it cannot be used for addtional testing after 24 hours, recollection will be required.       Radiologic Studies  CT LUMBAR SPINE WO CONTRAST   Final Result   Addendum (preliminary) 1 of 1   Addendum: There is a dictation recognition error in the impression. The   impression should say the following.       Mild levoconvex scoliosis with multilevel significant spondylosis. No    acute   abnormality.      Electronically signed by MARTIR NOBLES      Final   Mild levoconvex gliosis of the multilevel significant spondylosis. No acute   abnormality.         Electronically signed by MARTIR NOBLES

## 2024-11-15 NOTE — ED PROVIDER NOTES
[facility] EMERGENCY DEP  EMERGENCY DEPARTMENT ENCOUNTER      Date: (Not on file)  Patient Name: Eladia Purcell  MRN: [id number]  Birthdate 1960  Date of evaluation: 11/15/2024  Provider: AAMIR Villela NP   Note Started: 1:29 PM EST 11/15/24    CHIEF COMPLAINT     Chief Complaint   Patient presents with   • Back Pain       HISTORY OF PRESENT ILLNESS  (Onset, Location, Duration, Character, Alleviating/Aggravating, Radiation, Timing, Severity)   Note limiting factors.   History Provided By: Patient     HPI: Eladia Purcell is a 64 y.o. female with history of A-fib, ARLET, tubal ligation, presents with right lower back pain.  Pain is constant and radiating down the right posterior leg.  Described as sharp.  Worse with sitting and walking.  Disrupting sleep. Seen by PCP ([facility]) and given meloxicam and prednisone. No other OTC. No hx of cancer or IV drug use. Denies fevers, numbness/weakness, saddle anesthesia, incontinence, cp, sob.   Similar history of the same earlier this year.     No longer on Eliquis.      Nursing Notes and triage vitals were reviewed.  PCP: No primary care provider on file.      PAST MEDICAL HISTORY   Past Medical History:  Past Medical History:   Diagnosis Date   • Atrial fibrillation (HCC)    • Long term current use of anticoagulant therapy     Eliquis   • ARLET (obstructive sleep apnea)        Past Surgical History:  Past Surgical History:   Procedure Laterality Date   • TUBAL LIGATION         Family History:  Family History   Problem Relation Age of Onset   • Atrial Fibrillation Father    • Other Other         no known FH of CAD   • Hypertension Father        Social History:  Social History     Tobacco Use   • Smoking status: Every Day     Current packs/day: 1.00     Types: Cigarettes   • Smokeless tobacco: Never   Substance Use Topics   • Alcohol use: Not Currently   • Drug use: No       Allergies:  Allergies   Allergen Reactions   • Erythromycin Other (See

## (undated) DEVICE — KIT ELECTRD SURF FOR DISPLAYING THE 3D POS OF EP CATH

## (undated) DEVICE — KIT COAX UMBILICAL FOR N20 --

## (undated) DEVICE — PINNACLE INTRODUCER SHEATH: Brand: PINNACLE

## (undated) DEVICE — Device: Brand: ACUNAV 10F ULTRASOUND CATHETER

## (undated) DEVICE — CABLE RMFG RSPONS ELEC EXT RED --

## (undated) DEVICE — MEDI-TRACE CADENCE ADULT, DEFIBRILLATION ELECTRODE -RTS  (10 PR/PK) - PHYSIO-CONTROL: Brand: MEDI-TRACE CADENCE

## (undated) DEVICE — CABLE CATH CONN 10 PIN DISP -- ACHIEVE ADVANCE

## (undated) DEVICE — SET TBNG L260CM IRRIG RF THER COOL PNT

## (undated) DEVICE — CABLE EXT EP H/O/D BLK 150CM --

## (undated) DEVICE — CABLE RMFG E SUPREME 150CM RED --

## (undated) DEVICE — CATH QUAD 6F 2/5/2 120CM JSN --

## (undated) DEVICE — SHTH STEERABLE FLEXCATH 12 FR --

## (undated) DEVICE — PROBE ES TEMP HOT AND CLD FAST ACCURATE SFT FLX CIRCA S CATH

## (undated) DEVICE — STERILE (15.2 TAPERED TO 7.6 X 183CM) POLYETHYLENE ACCORDION-FOLDED COVER FOR USE WITH SIEMENS ACUNAV ULTRASOUND CATHETER FAMILY CONNECTOR: Brand: SWIFTLINK TRANSDUCER COVER

## (undated) DEVICE — REM POLYHESIVE ADULT PATIENT RETURN ELECTRODE: Brand: VALLEYLAB

## (undated) DEVICE — CATH EP CRV 7F DUO 2/8 2M LG -- LIVEWIRE STRL

## (undated) DEVICE — HEMOSTASIS VALVE PHD SM BORE WTH SPRNG METAL INSRT TOOL TRQU

## (undated) DEVICE — CHECK-FLO INTRODUCER SET: Brand: PERFORMER

## (undated) DEVICE — CATH EP ACHV MPPNG 3.3FR 20MM -- ACHIEVE

## (undated) DEVICE — PRESSURE MONITORING SET: Brand: TRUWAVE

## (undated) DEVICE — CATHETER CRYOABLATION 28 MM ARCTIC FRONT ADV

## (undated) DEVICE — TUBING PRSS MON L6IN PVC M FEM CONN

## (undated) DEVICE — HEART CATH-MRMC: Brand: MEDLINE INDUSTRIES, INC.

## (undated) DEVICE — KIT ELECTRICAL UMBILICAL --